# Patient Record
Sex: FEMALE | Race: WHITE | NOT HISPANIC OR LATINO | Employment: OTHER | URBAN - METROPOLITAN AREA
[De-identification: names, ages, dates, MRNs, and addresses within clinical notes are randomized per-mention and may not be internally consistent; named-entity substitution may affect disease eponyms.]

---

## 2018-06-04 ENCOUNTER — OFFICE VISIT CONVERTED (OUTPATIENT)
Dept: FAMILY MEDICINE CLINIC | Facility: CLINIC | Age: 63
End: 2018-06-04
Attending: NURSE PRACTITIONER

## 2018-06-18 ENCOUNTER — CONVERSION ENCOUNTER (OUTPATIENT)
Dept: FAMILY MEDICINE CLINIC | Facility: CLINIC | Age: 63
End: 2018-06-18

## 2018-06-18 ENCOUNTER — OFFICE VISIT CONVERTED (OUTPATIENT)
Dept: FAMILY MEDICINE CLINIC | Facility: CLINIC | Age: 63
End: 2018-06-18
Attending: NURSE PRACTITIONER

## 2018-07-02 ENCOUNTER — OFFICE VISIT CONVERTED (OUTPATIENT)
Dept: FAMILY MEDICINE CLINIC | Facility: CLINIC | Age: 63
End: 2018-07-02
Attending: NURSE PRACTITIONER

## 2018-07-02 ENCOUNTER — CONVERSION ENCOUNTER (OUTPATIENT)
Dept: FAMILY MEDICINE CLINIC | Facility: CLINIC | Age: 63
End: 2018-07-02

## 2018-07-16 ENCOUNTER — OFFICE VISIT CONVERTED (OUTPATIENT)
Dept: FAMILY MEDICINE CLINIC | Facility: CLINIC | Age: 63
End: 2018-07-16
Attending: NURSE PRACTITIONER

## 2018-07-30 ENCOUNTER — OFFICE VISIT CONVERTED (OUTPATIENT)
Dept: FAMILY MEDICINE CLINIC | Facility: CLINIC | Age: 63
End: 2018-07-30
Attending: NURSE PRACTITIONER

## 2018-07-30 ENCOUNTER — CONVERSION ENCOUNTER (OUTPATIENT)
Dept: FAMILY MEDICINE CLINIC | Facility: CLINIC | Age: 63
End: 2018-07-30

## 2018-08-13 ENCOUNTER — OFFICE VISIT CONVERTED (OUTPATIENT)
Dept: FAMILY MEDICINE CLINIC | Facility: CLINIC | Age: 63
End: 2018-08-13
Attending: NURSE PRACTITIONER

## 2018-08-27 ENCOUNTER — OFFICE VISIT CONVERTED (OUTPATIENT)
Dept: FAMILY MEDICINE CLINIC | Facility: CLINIC | Age: 63
End: 2018-08-27
Attending: NURSE PRACTITIONER

## 2018-09-24 ENCOUNTER — OFFICE VISIT CONVERTED (OUTPATIENT)
Dept: FAMILY MEDICINE CLINIC | Facility: CLINIC | Age: 63
End: 2018-09-24
Attending: NURSE PRACTITIONER

## 2018-10-23 ENCOUNTER — OFFICE VISIT CONVERTED (OUTPATIENT)
Dept: FAMILY MEDICINE CLINIC | Facility: CLINIC | Age: 63
End: 2018-10-23
Attending: NURSE PRACTITIONER

## 2018-11-08 ENCOUNTER — OFFICE VISIT CONVERTED (OUTPATIENT)
Dept: GASTROENTEROLOGY | Facility: CLINIC | Age: 63
End: 2018-11-08
Attending: PHYSICIAN ASSISTANT

## 2018-11-20 ENCOUNTER — OFFICE VISIT CONVERTED (OUTPATIENT)
Dept: FAMILY MEDICINE CLINIC | Facility: CLINIC | Age: 63
End: 2018-11-20
Attending: NURSE PRACTITIONER

## 2019-01-16 ENCOUNTER — HOSPITAL ENCOUNTER (OUTPATIENT)
Dept: FAMILY MEDICINE CLINIC | Facility: CLINIC | Age: 64
Discharge: HOME | End: 2019-01-16
Attending: NURSE PRACTITIONER

## 2019-01-16 ENCOUNTER — OFFICE VISIT CONVERTED (OUTPATIENT)
Dept: FAMILY MEDICINE CLINIC | Facility: CLINIC | Age: 64
End: 2019-01-16
Attending: NURSE PRACTITIONER

## 2019-01-16 LAB
FOLATE SERPL-MCNC: 15.7 NG/ML (ref 4.8–20)
VIT B12 SERPL-MCNC: 661 PG/ML (ref 211–911)

## 2019-02-14 ENCOUNTER — OFFICE VISIT CONVERTED (OUTPATIENT)
Dept: FAMILY MEDICINE CLINIC | Facility: CLINIC | Age: 64
End: 2019-02-14
Attending: NURSE PRACTITIONER

## 2019-03-12 ENCOUNTER — OFFICE VISIT CONVERTED (OUTPATIENT)
Dept: FAMILY MEDICINE CLINIC | Facility: CLINIC | Age: 64
End: 2019-03-12
Attending: NURSE PRACTITIONER

## 2019-03-12 ENCOUNTER — CONVERSION ENCOUNTER (OUTPATIENT)
Dept: FAMILY MEDICINE CLINIC | Facility: CLINIC | Age: 64
End: 2019-03-12

## 2019-07-08 ENCOUNTER — HOSPITAL ENCOUNTER (OUTPATIENT)
Dept: FAMILY MEDICINE CLINIC | Facility: CLINIC | Age: 64
Discharge: HOME | End: 2019-07-08
Attending: NURSE PRACTITIONER

## 2019-07-08 ENCOUNTER — OFFICE VISIT CONVERTED (OUTPATIENT)
Dept: FAMILY MEDICINE CLINIC | Facility: CLINIC | Age: 64
End: 2019-07-08
Attending: NURSE PRACTITIONER

## 2019-07-08 LAB
25(OH)D3 SERPL-MCNC: 41.5 NG/ML (ref 30–100)
ALBUMIN SERPL-MCNC: 4.3 G/DL (ref 3.5–5)
ALBUMIN/GLOB SERPL: 1.7 {RATIO} (ref 1.4–2.6)
ALP SERPL-CCNC: 104 U/L (ref 43–160)
ALT SERPL-CCNC: 11 U/L (ref 10–40)
ANION GAP SERPL CALC-SCNC: 21 MMOL/L (ref 8–19)
APPEARANCE UR: ABNORMAL
AST SERPL-CCNC: 17 U/L (ref 15–50)
BASOPHILS # BLD AUTO: 0.04 10*3/UL (ref 0–0.2)
BASOPHILS NFR BLD AUTO: 0.4 % (ref 0–3)
BILIRUB SERPL-MCNC: 0.36 MG/DL (ref 0.2–1.3)
BILIRUB UR QL: NEGATIVE
BUN SERPL-MCNC: 10 MG/DL (ref 5–25)
BUN/CREAT SERPL: 12 {RATIO} (ref 6–20)
CALCIUM SERPL-MCNC: 9.8 MG/DL (ref 8.7–10.4)
CHLORIDE SERPL-SCNC: 101 MMOL/L (ref 99–111)
CHOLEST SERPL-MCNC: 236 MG/DL (ref 107–200)
CHOLEST/HDLC SERPL: 4.3 {RATIO} (ref 3–6)
COLOR UR: YELLOW
CONV ABS IMM GRAN: 0.05 10*3/UL (ref 0–0.2)
CONV CO2: 26 MMOL/L (ref 22–32)
CONV COLLECTION SOURCE (UA): ABNORMAL
CONV CRYSTALS: ABNORMAL /[HPF]
CONV IMMATURE GRAN: 0.5 % (ref 0–1.8)
CONV TOTAL PROTEIN: 6.9 G/DL (ref 6.3–8.2)
CONV UROBILINOGEN IN URINE BY AUTOMATED TEST STRIP: 0.2 {EHRLICHU}/DL (ref 0.1–1)
CREAT UR-MCNC: 0.81 MG/DL (ref 0.5–0.9)
DEPRECATED RDW RBC AUTO: 51.3 FL (ref 36.4–46.3)
EOSINOPHIL # BLD AUTO: 0.11 10*3/UL (ref 0–0.7)
EOSINOPHIL # BLD AUTO: 1.2 % (ref 0–7)
ERYTHROCYTE [DISTWIDTH] IN BLOOD BY AUTOMATED COUNT: 13.5 % (ref 11.7–14.4)
FERRITIN SERPL-MCNC: 62 NG/ML (ref 10–200)
FOLATE SERPL-MCNC: 19.6 NG/ML (ref 4.8–20)
GFR SERPLBLD BASED ON 1.73 SQ M-ARVRAT: >60 ML/MIN/{1.73_M2}
GLOBULIN UR ELPH-MCNC: 2.6 G/DL (ref 2–3.5)
GLUCOSE SERPL-MCNC: 52 MG/DL (ref 65–99)
GLUCOSE UR QL: NEGATIVE MG/DL
HBA1C MFR BLD: 14.3 G/DL (ref 12–16)
HCT VFR BLD AUTO: 46.9 % (ref 37–47)
HDLC SERPL-MCNC: 55 MG/DL (ref 40–60)
HGB UR QL STRIP: ABNORMAL
IRON SATN MFR SERPL: 49 % (ref 20–55)
IRON SERPL-MCNC: 155 UG/DL (ref 60–170)
KETONES UR QL STRIP: NEGATIVE MG/DL
LDLC SERPL CALC-MCNC: 152 MG/DL (ref 70–100)
LEUKOCYTE ESTERASE UR QL STRIP: NEGATIVE
LYMPHOCYTES # BLD AUTO: 2.29 10*3/UL (ref 1–5)
MCH RBC QN AUTO: 31.2 PG (ref 27–31)
MCHC RBC AUTO-ENTMCNC: 30.5 G/DL (ref 33–37)
MCV RBC AUTO: 102.2 FL (ref 81–99)
MONOCYTES # BLD AUTO: 0.6 10*3/UL (ref 0.2–1.2)
MONOCYTES NFR BLD AUTO: 6.3 % (ref 3–10)
NEUTROPHILS # BLD AUTO: 6.38 10*3/UL (ref 2–8)
NEUTROPHILS NFR BLD AUTO: 67.4 % (ref 30–85)
NITRITE UR QL STRIP: NEGATIVE
NRBC CBCN: 0 % (ref 0–0.7)
OSMOLALITY SERPL CALC.SUM OF ELEC: 294 MOSM/KG (ref 273–304)
PH UR STRIP.AUTO: 5.5 [PH] (ref 5–8)
PLATELET # BLD AUTO: 270 10*3/UL (ref 130–400)
PMV BLD AUTO: 10.5 FL (ref 9.4–12.3)
POTASSIUM SERPL-SCNC: 3.6 MMOL/L (ref 3.5–5.3)
PROT UR QL: NEGATIVE MG/DL
RBC # BLD AUTO: 4.59 10*6/UL (ref 4.2–5.4)
RBC #/AREA URNS HPF: ABNORMAL /[HPF]
SODIUM SERPL-SCNC: 144 MMOL/L (ref 135–147)
SP GR UR: >=1.03 (ref 1–1.03)
T4 FREE SERPL-MCNC: 1.1 NG/DL (ref 0.9–1.8)
TIBC SERPL-MCNC: 317 UG/DL (ref 245–450)
TRANSFERRIN SERPL-MCNC: 222 MG/DL (ref 250–380)
TRIGL SERPL-MCNC: 147 MG/DL (ref 40–150)
TSH SERPL-ACNC: 2.05 M[IU]/L (ref 0.27–4.2)
VARIANT LYMPHS NFR BLD MANUAL: 24.2 % (ref 20–45)
VIT B12 SERPL-MCNC: 885 PG/ML (ref 211–911)
VLDLC SERPL-MCNC: 29 MG/DL (ref 5–37)
WBC # BLD AUTO: 9.47 10*3/UL (ref 4.8–10.8)
WBC #/AREA URNS HPF: ABNORMAL /[HPF]

## 2019-07-09 LAB — HCV AB SER DONR QL: <0.1 S/CO RATIO (ref 0–0.9)

## 2019-07-16 ENCOUNTER — HOSPITAL ENCOUNTER (OUTPATIENT)
Dept: OTHER | Facility: HOSPITAL | Age: 64
Discharge: HOME | End: 2019-07-16
Attending: NURSE PRACTITIONER

## 2019-07-23 ENCOUNTER — OFFICE VISIT CONVERTED (OUTPATIENT)
Dept: FAMILY MEDICINE CLINIC | Facility: CLINIC | Age: 64
End: 2019-07-23
Attending: NURSE PRACTITIONER

## 2019-11-11 ENCOUNTER — OFFICE VISIT CONVERTED (OUTPATIENT)
Dept: GASTROENTEROLOGY | Facility: CLINIC | Age: 64
End: 2019-11-11
Attending: PHYSICIAN ASSISTANT

## 2020-03-02 ENCOUNTER — HOSPITAL ENCOUNTER (OUTPATIENT)
Dept: GASTROENTEROLOGY | Facility: HOSPITAL | Age: 65
Setting detail: HOSPITAL OUTPATIENT SURGERY
Discharge: HOME | End: 2020-03-02
Attending: INTERNAL MEDICINE

## 2020-08-27 ENCOUNTER — OFFICE VISIT CONVERTED (OUTPATIENT)
Dept: FAMILY MEDICINE CLINIC | Facility: CLINIC | Age: 65
End: 2020-08-27
Attending: NURSE PRACTITIONER

## 2020-10-15 ENCOUNTER — HOSPITAL ENCOUNTER (OUTPATIENT)
Dept: FAMILY MEDICINE CLINIC | Facility: CLINIC | Age: 65
Discharge: HOME | End: 2020-10-15
Attending: NURSE PRACTITIONER

## 2020-10-15 LAB
ALBUMIN SERPL-MCNC: 4.3 G/DL (ref 3.5–5)
ALBUMIN/GLOB SERPL: 2 {RATIO} (ref 1.4–2.6)
ALP SERPL-CCNC: 106 U/L (ref 43–160)
ALT SERPL-CCNC: 10 U/L (ref 10–40)
ANION GAP SERPL CALC-SCNC: 15 MMOL/L (ref 8–19)
AST SERPL-CCNC: 19 U/L (ref 15–50)
BASOPHILS # BLD AUTO: 0.04 10*3/UL (ref 0–0.2)
BASOPHILS NFR BLD AUTO: 0.5 % (ref 0–3)
BILIRUB SERPL-MCNC: 0.27 MG/DL (ref 0.2–1.3)
BUN SERPL-MCNC: 9 MG/DL (ref 5–25)
BUN/CREAT SERPL: 12 {RATIO} (ref 6–20)
CALCIUM SERPL-MCNC: 9.7 MG/DL (ref 8.7–10.4)
CHLORIDE SERPL-SCNC: 107 MMOL/L (ref 99–111)
CHOLEST SERPL-MCNC: 216 MG/DL (ref 107–200)
CHOLEST/HDLC SERPL: 4.2 {RATIO} (ref 3–6)
CONV ABS IMM GRAN: 0.03 10*3/UL (ref 0–0.2)
CONV CO2: 24 MMOL/L (ref 22–32)
CONV IMMATURE GRAN: 0.4 % (ref 0–1.8)
CONV TOTAL PROTEIN: 6.5 G/DL (ref 6.3–8.2)
CREAT UR-MCNC: 0.77 MG/DL (ref 0.5–0.9)
DEPRECATED RDW RBC AUTO: 46.1 FL (ref 36.4–46.3)
EOSINOPHIL # BLD AUTO: 0.12 10*3/UL (ref 0–0.7)
EOSINOPHIL # BLD AUTO: 1.4 % (ref 0–7)
ERYTHROCYTE [DISTWIDTH] IN BLOOD BY AUTOMATED COUNT: 13 % (ref 11.7–14.4)
FOLATE SERPL-MCNC: 17.2 NG/ML (ref 4.8–20)
GFR SERPLBLD BASED ON 1.73 SQ M-ARVRAT: >60 ML/MIN/{1.73_M2}
GLOBULIN UR ELPH-MCNC: 2.2 G/DL (ref 2–3.5)
GLUCOSE SERPL-MCNC: 111 MG/DL (ref 65–99)
HCT VFR BLD AUTO: 45 % (ref 37–47)
HDLC SERPL-MCNC: 51 MG/DL (ref 40–60)
HGB BLD-MCNC: 14.7 G/DL (ref 12–16)
LDLC SERPL CALC-MCNC: 131 MG/DL (ref 70–100)
LYMPHOCYTES # BLD AUTO: 2.28 10*3/UL (ref 1–5)
LYMPHOCYTES NFR BLD AUTO: 26.8 % (ref 20–45)
MCH RBC QN AUTO: 31.8 PG (ref 27–31)
MCHC RBC AUTO-ENTMCNC: 32.7 G/DL (ref 33–37)
MCV RBC AUTO: 97.4 FL (ref 81–99)
MONOCYTES # BLD AUTO: 0.65 10*3/UL (ref 0.2–1.2)
MONOCYTES NFR BLD AUTO: 7.6 % (ref 3–10)
NEUTROPHILS # BLD AUTO: 5.4 10*3/UL (ref 2–8)
NEUTROPHILS NFR BLD AUTO: 63.3 % (ref 30–85)
NRBC CBCN: 0 % (ref 0–0.7)
OSMOLALITY SERPL CALC.SUM OF ELEC: 293 MOSM/KG (ref 273–304)
PLATELET # BLD AUTO: 285 10*3/UL (ref 130–400)
PMV BLD AUTO: 10 FL (ref 9.4–12.3)
POTASSIUM SERPL-SCNC: 4.2 MMOL/L (ref 3.5–5.3)
RBC # BLD AUTO: 4.62 10*6/UL (ref 4.2–5.4)
SODIUM SERPL-SCNC: 142 MMOL/L (ref 135–147)
TRIGL SERPL-MCNC: 171 MG/DL (ref 40–150)
VIT B12 SERPL-MCNC: 814 PG/ML (ref 211–911)
VLDLC SERPL-MCNC: 34 MG/DL (ref 5–37)
WBC # BLD AUTO: 8.52 10*3/UL (ref 4.8–10.8)

## 2021-05-07 NOTE — PROGRESS NOTES
Progress Note      Patient Name: Faith Alegria   Patient ID: [id number]   Sex: Female   YOB: 1955        Visit Date: June 18, 2018    Provider: LIVE Lazaro   Location: [facility]   Location Address: [address] [place] [address]  [id number]   Location Phone: [phone]          Chief Complaint     follow up on plantar wart       History Of Present Illness  Faith Alegria is a 62 year old /White female who presents for evaluation and treatment of:      right plantar wart F/U treatment--    daughter also noticed while grandson was in the hospital --that her right index finger would shake and then her head very finely as well--they are concerned with Parkinson's--    and left eye also got a bleed--was sent from eye MD to ophthalmologist--for injections to the left eye and then vision has improved a lot--and then pt just didn't F/U with them due to the grandsons death     but admits that had to even get motivated     then takes advil at times for back at PM and Benadryl at times--and no trouble with stomach--    and admits to also a balance issue at times--then also like a mild pressure to the right sheng of the posterior aspect of her head            Past Medical History  Disease Name Date Onset Notes   Arthritis --  --    Cataract --  --    GERD (gastroesophageal reflux disease) --  --    Hyperlipidemia --  --          Past Surgical History  Procedure Name Date Notes   Cataract surgery --  --    Foot surgery --  --    Hysterectomy --  --    Hysterectomy (due to cancer) --  --    Low Back Disc --  --    Shoulder surgery --  --          Allergy List  Allergen Name Date Reaction Notes   NO KNOWN DRUG ALLERGIES --  --  --          Family Medical History  Disease Name Relative/Age Notes   Alcohol Abuse / Father; Grandfather (maternal); Grandfather (paternal); Brother    Brother/     Father/     Grandfather (maternal)/     Grandfather  (paternal)/    Depression / Mother; Brother; Sister    Brother/     Mother/     Sister/    Hyperlipidemia / Mother; Sister    Mother/     Sister/    Hypertension / Mother; Brother; Sister    Brother/     Mother/     Sister/    Osteoporosis / Sister    Sister/          Social History  Finding Status Start/Stop Quantity Notes   Alcohol Never --/-- --  never drinks alcohol   Exercises regularly --  --/-- --  occasionally   Recreational Drug Use Never --/-- --  never used   Second hand smoke exposure Unknown --/-- --  yes   Tobacco Current - status unknown --/-- 0.5 PPD smokes less than 1 pack per day, for 20 years, never uses other tobacco products  SMOKER. ABOUT PACK A DAY.   Uses seatbelts --  --/-- --  yes         Review of Systems  · Constitutional  o Denies  o : fever  · Eyes  o Admits  o : impaired vision, additional ocular symptoms except as noted in the HPI  o Denies  o : discharge from eye, eye discomfort, eye pain  · HENT  o Denies  o : hearing loss or ringing, chronic sinus problem, swollen glands in neck  · Cardiovascular  o Denies  o : chest pain, palpitations (fast, fluttering, or skipping beats), swelling (feet, ankles, hands), shortness of breath while walking or lying flat  · Respiratory  o Denies  o : shortness of breath, asthma or wheezing, COPD  · Gastrointestinal  o Denies  o : ulcers, nausea or vomiting  · Genitourinary  o Denies  o : dysuria  · Integument  o Admits  o : additional integumentary symptoms except as noted in the HPI  · Neurologic  o Admits  o : tremors, loss of balance  · Musculoskeletal  o Admits  o : joint pain, back pain  · Endocrine  o Denies  o : thyroid disease, diabetes, heat or cold intolerance, excessive thirst or urination  · Psychiatric  o Admits  o : additional psychiatric symptoms except as noted in the HPI  o Denies  o : anxiety, depression, suicidal ideation, homicidal ideation  · Heme-Lymph  o Denies  o : bleeding or bruising tendency, anemia      Vitals  Date Time  BP Position Site L\R Cuff Size HR RR TEMP(F) WT  HT  BMI kg/m2 BSA m2 O2 Sat        06/18/2018 10:07 /68 Sitting    103 - R  97 136lbs 8oz    97 %           Physical Examination  · Constitutional  o Appearance  o : well developed, well-nourished, in no acute distress  · Eyes  o Conjunctivae  o : conjunctivae normal no drainage  o Pupils and Irises  o : pupils equal, round, and reactive to light bilaterally  · Neck  o Inspection/Palpation  o : supple  o Thyroid  o : no thyromegaly  · Respiratory  o Respiratory Effort  o : breathing unlabored  o Auscultation of Lungs  o : clear to ascultation  · Cardiovascular  o Heart  o :   § Auscultation of Heart  § : regular rate and rhythm  · Musculoskeletal  o General  o :   § General Musculoskeletal  § : No joint swelling or deformity., Muscle tone, strength, and development grossly normal.  · Skin and Subcutaneous Tissue  o General Inspection  o : no lesions present, no areas of discoloration, skin turgor normal, texture normal--except the small plantar wart to the upper outer aspect of the right bottom of the foot--and applied liquid nitrogen to the bottom of the foot approx. 30 seconds and then tolerated well   · Neurologic  o Mental Status Examination  o :   § Orientation  § : grossly oriented to person, place and time (+) fine hand tremors resting --a little tearful today mentioning her grandson   o Gait and Station  o :   § Gait Screening  § : normal gait              Assessment  · Balance problem     781.99/R26.89  · Tremor     781.0/R25.1  · Screening, lipid     V77.91/Z13.220  · Plantar wart of right foot     078.12/B07.0      Plan  · Orders  o CBC with Auto Diff [facility] ([id number]) - 781.99/R26.89, 781.0/R25.1 - 06/18/2018  o CMP [facility] ([id number]) - 781.99/R26.89, 781.0/R25.1 - 06/18/2018  o Lipid Panel [facility] (47514) - V77.91/Z13.220 - 06/18/2018  o Thyroid Profile (THYII) - 781.99/R26.89, 781.0/R25.1 - 06/18/2018  o ACO-39: Current medications updated and reviewed () -  - 06/18/2018  o CT Head/Brain without IV Contrast [facility] (26361) - 781.99/R26.89, 781.0/R25.1 - 06/18/2018  o NEUROLOGY CONSULTATION. (NEURO) - 781.99/R26.89, 781.0/R25.1 - 06/18/2018  o Vitamin B12 and folate measurement (48023, 17071) - 781.99/R26.89, 781.0/R25.1 - 06/18/2018  · Instructions  o Patient was educated/instructed on their diagnosis, treatment and medications prior to discharge from the clinic today.  o Patient instructed to seek medical attention urgently for new or worsening symptoms.  o Call the office with any concerns or questions.  · Disposition  o Call or Return if symptoms worsen or persist.     pt can F/U in 2 weeks should the plantar wart still need Tx             Electronically Signed by: LIVE Lazaro -Author on June 18, 2018 10:40:01 AM

## 2021-05-07 NOTE — PROGRESS NOTES
Progress Note      Patient Name: Faith Alegria   Patient ID: [id number]   Sex: Female   YOB: 1955    Primary Care Provider: Rosalba CROWDER   Referring Provider: Rosalba CROWDER    Visit Date: August 27, 2020    Provider: VEL Lazaro   Location: [facility]   Location Address: [address] [place] [address] [place]  [id number]   Location Phone: [phone]          Chief Complaint  · Annual Wellness Exam      History Of Present Illness  The patient is a 65 year old /White female who has come to this office for her Annual Wellness Visit.   Her Primary Care Provider is Rosalba CROWDER. Her comprehensive Care Team list, including suppliers, has been updated on the Facesheet. Her medical/family history, height, weight, BMI, and blood pressure have been reviewed and are in the chart. The Health Risk Assessment has been completed and scanned in the chart.   Medications are listed in the medication list.   The active problem list includes: Arthritis, Cataract, GERD (gastroesophageal reflux disease), and Hyperlipidemia   The patient does have a history of substance use. This includes tobacco use.   Patient reports her diet is adequate.   The Mini-Cog has been administered and is scanned in chart. The results are negative. Her cognitive function is without limitation.   A hearing loss screen was completed today and the result is negative.   Patient does not have any risk factors for depression. Patient completed the PHQ-9 today and it has been scanned in the chart. The total score is 1-4.   The Timed Up and Go screen was administered today and the result is negative.   The Mullen Index of [place] in ADLs indicated full function (score of 6).   A Falls Risk Assessment has been completed, including a review of home fall hazards and medication review.   Overall, the patient's functional ability is noted by this provider to be within normal  limits. Her level of safety is noted to be within normal limits. Her balance/gait is within normal limits. There have been no falls in the past year. Patient-specific home safety recommendations have been reviewed and a copy has been given to patient.   She denies issues with leaking urine.   There are no additional risk factors identified.   Living Will/Advanced Directive no-but information given today.   Personalized health advice was given to the patient and a written health screening schedule was established; see Plan for details.   Faith Alegria is a 65 year old /White female who presents for evaluation and treatment of:      pt with OA and GERD and dyslipidemia and cataracts--Hx    pt was dog bite approx. 9-10 DAYS AGO AND TREATED AT [facility] AND SUTURES DUE FOR REMOVAL AND RECEIEVD TDAP THERE       Past Medical History  Disease Name Date Onset Notes   Arthritis --  --    Bone Density Screening 07/16/2019 --    Cataract --  --    GERD (gastroesophageal reflux disease) --  --    Hyperlipidemia --  --    Screening Mammogram 07/16/2019 --          Past Surgical History  Procedure Name Date Notes   Cataract surgery --  --    Foot surgery --  --    Hysterectomy --  --    Hysterectomy (due to cancer) --  --    Low Back Disc --  --    Shoulder surgery --  --          Medication List  Name Date Started Instructions   cyanocobalamin (vitamin B-12) 1,000 mcg/mL injection solution 07/08/2019 inject 1 milliliter (1,000 mcg) by intramuscular route once a month for 30 days         Allergy List  Allergen Name Date Reaction Notes   NO KNOWN DRUG ALLERGIES --  --  --          Family Medical History  Disease Name Relative/Age Notes   Depression Brother/  Mother/  Sister/   Mother; Brother; Sister   Hyperlipidemia Mother/  Sister/   Mother; Sister   Hypertension Brother/  Mother/  Sister/   Mother; Brother; Sister   Osteoporosis Sister/   Sister   Alcohol abuse Brother/  Father/  Grandfather  (maternal)/  Grandfather (paternal)/   Father; Grandfather (maternal); Grandfather (paternal); Brother         Social History  Finding Status Start/Stop Quantity Notes   Alcohol Never --/-- --  never drinks alcohol   Exercises regularly --  --/-- --  occasionally   Recreational Drug Use Never --/-- --  never used   Second hand smoke exposure Unknown --/-- --  yes   Tobacco Current every day 18/-- 0.5 PPD smokes less than 1 pack per day, for 20 years, never uses other tobacco products  SMOKER. ABOUT PACK A DAY.   Uses seatbelts --  --/-- --  yes         Immunizations  NameDate Admin Mfg Trade Name Lot Number Route Inj VIS Given VIS Publication   Egoewiyud08/23/2018 [facility] FLUARIX [id number] IM  10/23/2018 08/07/2015   Comments: NDC [id number]   Vlhielozp8650/23/2019 [employer] PNEUMOVAX 23 [id number] IM  07/23/2019    Comments: NDC [id number]         Review of Systems  · Constitutional  o Denies  o : fatigue, fever, recent weight changes  · Eyes  o Denies  o : double vision, blurred vision  · HENT  o Denies  o : ear pain, sore throat, hoarseness, dysphagia, enlarged lymph nodes  · Cardiovascular  o Denies  o : chest discomfort, palpitations  · Respiratory  o Denies  o : cough, wheezing, shortness of breath  · Gastrointestinal  o Denies  o : abdominal pain, melena, nausea, vomiting  · Genitourinary  o Denies  o : dysuria  · Integument  o Denies  o : rash  · Neurologic  o Denies  o : paralysis, tingling or numbness, incoordination, memory difficulties, tremors, altered mental status, difficulty concentrating, seizures, loss of balance  · Musculoskeletal  o Admits  o : joint pain  o Denies  o : joint swelling  · Endocrine  o Denies  o : polyuria, polydipsia, cold intolerance, heat intolerance, hot flashes, hirsutism, loss of hair  · Psychiatric  o Denies  o : depression, suicidal ideation  · Heme-Lymph  o Denies  o : lightheadedness, easy bleeding, easy bruising, petechiae, lymph node enlargement or  "tenderness  · Allergic-Immunologic  o Denies  o : sinus allergy symptoms, frequent illnesses      Vitals  Date Time BP Position Site L\R Cuff Size HR RR TEMP (F) WT  HT  BMI kg/m2 BSA m2 O2 Sat        08/27/2020 01:10 /68 Sitting    103 - R  98 131lbs 9oz 4'  9.5\" 27.98 1.56 97 %          Physical Examination  · Constitutional  o Appearance  o : well-nourished, well developed, no obvious deformities present  · Eyes  o Conjunctivae  o : conjunctivae normal no drainage  o Pupils and Irises  o : pupils equal, round, and reactive to light bilaterally  · Neck  o Inspection/Palpation  o : supple  o Thyroid  o : no thyromegaly  · Respiratory  o Respiratory Effort  o : breathing unlabored  o Auscultation of Lungs  o : clear to ascultation  · Cardiovascular  o Heart  o :   § Auscultation of Heart  § : regular rate and rhythm  · Musculoskeletal  o General  o :   § General Musculoskeletal  § : No joint swelling or deformity., Muscle tone, strength, and development grossly normal. some OA and joint swelling of the great toe joints and the hands bilat   · Skin and Subcutaneous Tissue  o General Inspection  o : skin turgor normal, texture normal  · Neurologic  o Mental Status Examination  o :   § Orientation  § : grossly oriented to person, place and time  o Gait and Station  o :   § Gait Screening  § : normal gait  · In Office Procedures  o In Office Procedure  o : left arm--at the wrist area (inner aspect) intact sutures from the prior dog bit and due for sutures removed-no s/s of infections--removed 4 placed of the sutures total of 8 sutures removed and pt tolerated well               Assessment  · Screening for alcoholism     V79.1/Z13.89  · Screening for depression     V79.0/Z13.89  · Encounter for Medicare annual wellness exam     V70.0/Z00.00  · Encounter for removal of sutures     V58.32/Z48.02  · Osteoarthritis     715.90/M19.90  · Dyslipidemia     272.4/E78.5  · GERD (gastroesophageal reflux " disease)     530.81/K21.9  · Vitamin B12 deficiency     266.2/E53.8  · Hyperlipidemia     272.4/E78.5  · Tobacco abuse counseling       Tobacco abuse counseling     V65.42/Z71.6  · Osteopenia     733.90/M85.80  last BD 2019      Plan  · Orders  o Negative alcohol screening () - V79.1/Z13.89 - 08/27/2020  o Negative screen for clinical depression using a standardized tool, patient not eligible/appropriate for follow-up plan documented () - V79.0/Z13.89 - 08/27/2020  o Falls Risk Assessment Completed (3288F) - V70.0/Z00.00 - 08/27/2020  o Brief hearing screening (written) [facility] () - V70.0/Z00.00 - 08/27/2020  o Annual wellness visit, includes a personalized prevention plan of service (pps), subsequent visit () - V70.0/Z00.00 - 08/27/2020  o ACO-13: Fall Risk Screening with no falls in past year or only one fall without injury in the past year (1101F) - V70.0/Z00.00 - 08/27/2020  o Presence or absence of urinary incontinence assessed (PIA) (1090F) - V70.0/Z00.00 - 08/27/2020  o B12 Folate levels (B12FO) - 266.2/E53.8 - 08/27/2020  o HTN/Lipid Panel (CMP, Lipid) [facility] (60028, 27333) - 272.4/E78.5 - 08/27/2020  o Smoking cessation counseling, 3-10 minutes [facility] (96051) - V65.42/Z71.6 - 08/27/2020  o ACO-17: Screened for tobacco use AND received tobacco cessation intervention (4004F) - V65.42/Z71.6 - 08/27/2020  o ACO-39: Current medications updated and reviewed () - - 08/27/2020  o ACO-19: Colorectal cancer screening results documented and reviewed (3017F) - - 08/27/2020  o ACO-20: Screening Mammography documented and reviewed () - - 08/27/2020 7/2019  o CBC with Auto Diff [facility] (01882) - 715.90/M19.90, 272.4/E78.5, 530.81/K21.9 - 08/27/2020  o ACO-13: Fall Risk Screening with no falls in past year or only one fall without injury in the past year (1101F) - - 08/27/2020  o ACO-18: Negative screen for clinical depression using a standardized tool () - - 08/27/2020    3  · Medications  o cyanocobalamin (vitamin B-12) 1,000 mcg/mL injection solution   SIG: inject 1 milliliter (1,000 mcg) by intramuscular route once a month for 30 days   DISP: (1) 1 ml vial with 5 refills  Discontinued on 08/27/2020     o Medications have been Reconciled  o Transition of Care or Provider Policy  · Instructions  o Audit-C Questionnaire completed and scanned into the EMR under the designated folder within the patient's documents.  o Audit-C score of 0-4 - Negative Screen - Brief Discussion  o Depression Screen completed and scanned into the EMR under the designated folder within the patient's documents.  o PHQ-9 results between 1-4 indicate low-risk for Depression  o Today's PHQ-9 score is: _3__  o Health Risk Assessment has been reviewed with the patient.  o Written health screening schedule for next 5-10 years was established with patient; information scanned in chart and given/mailed to patient.  o Fall prevention methods discussed and a copy of recommendations given/mailed to patient.  o Counseled on the need for diet changes (low carbohydrate; avoid sweets) and exercise.   o Tylenol may be used as needed every 4-6 hours for pain.  o Maintain a healthy weight. Avoid tight fitting clothes. Avoid fried, fatty foods, tomato sauce, chocolate, mint, garlic, onion, alcohol. caffeine. Eat smaller meals, dont lie down after a meal, dont smoke. Elevate the head of your bed 6-9 inches.  o Advised that cheeses and other sources of dairy fats, animal fats, fast food, and the extras (candy, pasteries, pies, doughnuts and cookies) all contain LDL raising nutrients. Advised to increase fruits, vegetables, whole grains, and to monitor portion sizes.   o Tobacco and smoking cessation counseling for more than 3 minutes was completed.  o Handouts were given to patient: for the medicare AR and safety paperwork   o Patient was educated/instructed on their diagnosis, treatment and medications prior to discharge from  the clinic today.  o Call the office with any concerns or questions.  o Chronic conditions reviewed and taken into consideration for today's treatment plan.  o Electronically Identified Patient Education Materials Provided Electronically  · Disposition  o Call or Return if symptoms worsen or persist.  o Return Visit Request in/on 1 year +/- 2 days (7622).            Electronically Signed by: VEL Lazaro -Author on August 27, 2020 02:27:14 PM

## 2021-05-07 NOTE — PROGRESS NOTES
Progress Note      Patient Name: Faith Alegria   Patient ID: [id number]   Sex: Female   YOB: 1955        Visit Date: July 2, 2018    Provider: LIVE Lazaro   Location: [facility]   Location Address: [address] [place] [address]  [id number]   Location Phone: [phone]          Chief Complaint     PATIENT IS HERE TO F/U ON LAB WORK, START B12 SHOTS AND TO LOOK AT THE PLANTUAR WART ON THE BOTTOM OF HER RIGHT FOOT.       History Of Present Illness  Faith Alegria is a 62 year old /White female who presents for evaluation and treatment of:      pt is to start vit B12 shots monthly--    then also needs treatment on the plantar wart again--       Past Medical History  Disease Name Date Onset Notes   Arthritis --  --    Cataract --  --    GERD (gastroesophageal reflux disease) --  --    Hyperlipidemia --  --          Past Surgical History  Procedure Name Date Notes   Cataract surgery --  --    Foot surgery --  --    Hysterectomy --  --    Hysterectomy (due to cancer) --  --    Low Back Disc --  --    Shoulder surgery --  --          Allergy List  Allergen Name Date Reaction Notes   NO KNOWN DRUG ALLERGIES --  --  --          Family Medical History  Disease Name Relative/Age Notes   Alcohol Abuse / Father; Grandfather (maternal); Grandfather (paternal); Brother    Brother/     Father/     Grandfather (maternal)/     Grandfather (paternal)/    Depression / Mother; Brother; Sister    Brother/     Mother/     Sister/    Hyperlipidemia / Mother; Sister    Mother/     Sister/    Hypertension / Mother; Brother; Sister    Brother/     Mother/     Sister/    Osteoporosis / Sister    Sister/          Social History  Finding Status Start/Stop Quantity Notes   Alcohol Never --/-- --  never drinks alcohol   Exercises regularly --  --/-- --  occasionally   Recreational Drug Use Never --/-- --  never used   Second hand smoke exposure Unknown --/-- --  yes  "  Tobacco Current - status unknown --/-- 0.5 PPD smokes less than 1 pack per day, for 20 years, never uses other tobacco products  SMOKER. ABOUT PACK A DAY.   Uses seatbelts --  --/-- --  yes         Review of Systems  · Constitutional  o Admits  o : fatigue  o Denies  o : fever  · HENT  o Denies  o : vertigo, recent head injury  · Cardiovascular  o Denies  o : chest pain, irregular heart beats  · Respiratory  o Denies  o : shortness of breath, productive cough  · Gastrointestinal  o Denies  o : nausea, vomiting  · Genitourinary  o Denies  o : dysuria, urinary retention  · Integument  o Admits  o : additional integumentary symptoms except as noted in the HPI  o Denies  o : hair growth change, new skin lesions  · Neurologic  o Denies  o : altered mental status, seizures  · Musculoskeletal  o Denies  o : joint swelling, limitation of motion  · Endocrine  o Denies  o : cold intolerance, heat intolerance  · Heme-Lymph  o Denies  o : petechiae, lymph node enlargement or tenderness  · Allergic-Immunologic  o Denies  o : frequent illnesses      Vitals  Date Time BP Position Site L\R Cuff Size HR RR TEMP(F) WT  HT  BMI kg/m2 BSA m2 O2 Sat HC       07/02/2018 10:07 /58 Sitting    100 - R  97 136lbs 0oz 4'  8\" 30.49 1.56 97 %           Physical Examination  · Constitutional  o Appearance  o : well developed, well-nourished, in no acute distress  · Head and Face  o HEENT  o : Unremarkable  · Eyes  o Conjunctivae  o : conjunctivae normal  · Neck  o Inspection/Palpation  o : supple  o Thyroid  o : no thyromegaly  · Respiratory  o Respiratory Effort  o : breathing unlabored  o Auscultation of Lungs  o : clear to ascultation  · Cardiovascular  o Heart  o :   § Auscultation of Heart  § : regular rate and rhythm  o Peripheral Vascular System  o :   § Extremities  § : no edema  · Lymphatic  o Neck  o : no lymphadenopathy present  · Musculoskeletal  o General  o :   § General Musculoskeletal  § : No joint swelling or " deformity., Muscle tone, strength, and development grossly normal.  · Skin and Subcutaneous Tissue  o General Inspection  o : right foot with (+) plantar wart--and here for Tx and used/applied liquid nitrogen approx. total 30 seconds and tolerated well--and skin turgor normal, texture normal  · Neurologic  o Gait and Station  o :   § Gait Screening  § : normal gait  · Psychiatric  o Mood and Affect  o : mood normal, affect appropriate              Assessment  · Fatigue     780.79/R53.83  · Plantar wart of right foot     078.12/B07.0  · Low vitamin B12 level     266.2/E53.8      Plan  · Orders  o ACO-39: Current medications updated and reviewed () - - 07/02/2018  o Vitamin B12 Injection () - 266.2/E53.8, 780.79/R53.83 - 07/02/2018   Injection - Vitamin B12; Dose: 1 mL; Site: Left Upper Arm; Route: intramuscular; Date: 07/02/2018 10:42:06; Exp: 04/01/2019; Lot: [id number]; Mfg: [employer]; TradeName: cyanocobalamin (vitamin B-12); Location: [facility]; Administered By: Anna Harmon MA; Comment: NDC [id number]  o IM - Injection Fee (29973) - 266.2/E53.8, 780.79/R53.83 - 07/02/2018  · Medications  o cyanocobalamin (vitamin B-12) 1,000 mcg/mL injection solution   SIG: inject 1 milliliter (1,000 mcg) by intramuscular route once a month for 30 days   DISP: (1) 1 ml vial with 5 refills  Prescribed on 07/02/2018     · Instructions  o Patient was educated/instructed on their diagnosis, treatment and medications prior to discharge from the clinic today.  o Patient instructed to seek medical attention urgently for new or worsening symptoms.  o Call the office with any concerns or questions.  o Chronic conditions reviewed and taken into consideration for today's treatment plan.  · Disposition  o Call or Return if symptoms worsen or persist.  o Return Visit Request in/on 2 weeks +/- 2 days ([id number]).            Electronically Signed by: LIVE Lazaro -Author on July 2, 2018 11:42:32 AM

## 2021-05-07 NOTE — PROGRESS NOTES
Progress Note      Patient Name: Faith Alegria   Patient ID: [id number]   Sex: Female   YOB: 1955    Primary Care Provider: Rosalba MANCINI   Referring Provider: Rosalba MANCINI    Visit Date: November 20, 2018    Provider: LIVE Lazaro   Location: [facility]   Location Address: [address] [place] [address]  [id number]   Location Phone: [phone]          Chief Complaint     Here for B12 shot only.       History Of Present Illness  Faith Alegria is a 63 year old /White female who presents for evaluation and treatment of:      pt here for her B12 shot--       Past Medical History  Disease Name Date Onset Notes   Arthritis --  --    Cataract --  --    GERD (gastroesophageal reflux disease) --  --    Hyperlipidemia --  --          Past Surgical History  Procedure Name Date Notes   Cataract surgery --  --    Foot surgery --  --    Hysterectomy --  --    Hysterectomy (due to cancer) --  --    Low Back Disc --  --    Shoulder surgery --  --          Medication List  Name Date Started Instructions   cyanocobalamin (vitamin B-12) 1,000 mcg/mL injection solution 07/02/2018 inject 1 milliliter (1,000 mcg) by intramuscular route once a month for 30 days         Allergy List  Allergen Name Date Reaction Notes   NO KNOWN DRUG ALLERGIES --  --  --          Family Medical History  Disease Name Relative/Age Notes   Alcohol Abuse / Father; Grandfather (maternal); Grandfather (paternal); Brother    Brother/     Father/     Grandfather (maternal)/     Grandfather (paternal)/    Depression / Mother; Brother; Sister    Brother/     Mother/     Sister/    Hyperlipidemia / Mother; Sister    Mother/     Sister/    Hypertension / Mother; Brother; Sister    Brother/     Mother/     Sister/    Osteoporosis / Sister    Sister/          Social History  Finding Status Start/Stop Quantity Notes   Alcohol Never --/-- --  never drinks alcohol    Exercises regularly --  --/-- --  occasionally   Recreational Drug Use Never --/-- --  never used   Second hand smoke exposure Unknown --/-- --  yes   Tobacco Current every day 18/-- 0.5 PPD smokes less than 1 pack per day, for 20 years, never uses other tobacco products  SMOKER. ABOUT PACK A DAY.   Uses seatbelts --  --/-- --  yes         Immunizations  NameDate Admin Mfg Trade Name Lot Number Route Inj VIS Given VIS Publication   [id number]/23/2018 [facility] Fluzone Quadrivalent [id number] IM  10/23/2018 08/07/2015   Comments: NDC [id number]         Review of Systems  · Constitutional  o Admits  o : fatigue  · HENT  o Denies  o : vertigo, recent head injury  · Cardiovascular  o Denies  o : chest pain, irregular heart beats  · Respiratory  o Denies  o : shortness of breath, productive cough  · Gastrointestinal  o Denies  o : nausea, vomiting  · Musculoskeletal  o Admits  o : joint pain  o Denies  o : joint swelling, limitation of motion  · Endocrine  o Denies  o : cold intolerance, heat intolerance  · Psychiatric  o Denies  o : suicidal ideation, homicidal ideation  · Heme-Lymph  o Denies  o : petechiae, lymph node enlargement or tenderness      Vitals  Date Time BP Position Site L\R Cuff Size HR RR TEMP(F) WT  HT  BMI kg/m2 BSA m2 O2 Sat        11/20/2018 08:28 /78 Sitting    82 - R  97 132lbs 1oz    96 %           Physical Examination  · Constitutional  o Appearance  o : well developed, well-nourished, in no acute distress  · Head and Face  o HEENT  o : Unremarkable  · Eyes  o Conjunctivae  o : conjunctivae normal  · Neck  o Inspection/Palpation  o : supple  o Thyroid  o : no thyromegaly  · Respiratory  o Respiratory Effort  o : breathing unlabored  o Auscultation of Lungs  o : clear to ascultation  · Cardiovascular  o Heart  o :   § Auscultation of Heart  § : regular rate and rhythm  o Peripheral Vascular System  o :   § Extremities  § : no edema  · Lymphatic  o Neck  o : no lymphadenopathy  present  · Musculoskeletal  o General  o :   § General Musculoskeletal  § : No joint swelling or deformity., Muscle tone, strength, and development grossly normal.  · Skin and Subcutaneous Tissue  o General Inspection  o : skin turgor normal, texture normal  · Neurologic  o Gait and Station  o :   § Gait Screening  § : normal gait  · Psychiatric  o Mood and Affect  o : mood normal, affect appropriate          Assessment  · Vitamin B12 deficiency     266.2/E53.8      Plan  · Orders  o ACO-39: Current medications updated and reviewed () - - 11/20/2018  o Vitamin B12 Injection () - 266.2/E53.8 - 11/20/2018   patient brought own serum. LOT: [id number] EXP: 07/2020 NDC: 09774-237-24  o IM - Injection Fee [facility] (72191) - 266.2/E53.8 - 11/20/2018  · Instructions  o Patient was educated/instructed on their diagnosis, treatment and medications prior to discharge from the clinic today.  o Call the office with any concerns or questions.  o Chronic conditions reviewed and taken into consideration for today's treatment plan.  · Disposition  o Call or Return if symptoms worsen or persist.  o Return Visit Request in/on 1 month +/- 2 days (0103).            Electronically Signed by: LIVE Lazaro -Author on November 20, 2018 08:48:36 AM

## 2021-05-07 NOTE — PROGRESS NOTES
Progress Note      Patient Name: Faith Alegria   Patient ID: [id number]   Sex: Female   YOB: 1955    Primary Care Provider: Rosalba MANCINI   Referring Provider: Rosalba MANCINI    Visit Date: October 23, 2018    Provider: LIVE Lazaro   Location: [facility]   Location Address: [address] [place] [address]  [id number]   Location Phone: [phone]          Chief Complaint     PATIENT IS HERE FOR HER FLU AND B12 SHOT.  SHE ALSO WANTED YOU TO LOOK AT HER FOOT AGAIN.       History Of Present Illness  Faith Alegria is a 63 year old /White female who presents for evaluation and treatment of:      B12 shot due today     flu shot today    then plantar wart--really getting smaller and needs treated once again today--       Past Medical History  Disease Name Date Onset Notes   Arthritis --  --    Cataract --  --    GERD (gastroesophageal reflux disease) --  --    Hyperlipidemia --  --          Past Surgical History  Procedure Name Date Notes   Cataract surgery --  --    Foot surgery --  --    Hysterectomy --  --    Hysterectomy (due to cancer) --  --    Low Back Disc --  --    Shoulder surgery --  --          Medication List  Name Date Started Instructions   cyanocobalamin (vitamin B-12) 1,000 mcg/mL injection solution 07/02/2018 inject 1 milliliter (1,000 mcg) by intramuscular route once a month for 30 days         Allergy List  Allergen Name Date Reaction Notes   NO KNOWN DRUG ALLERGIES --  --  --          Family Medical History  Disease Name Relative/Age Notes   Alcohol Abuse / Father; Grandfather (maternal); Grandfather (paternal); Brother    Brother/     Father/     Grandfather (maternal)/     Grandfather (paternal)/    Depression / Mother; Brother; Sister    Brother/     Mother/     Sister/    Hyperlipidemia / Mother; Sister    Mother/     Sister/    Hypertension / Mother; Brother; Sister    Brother/     Mother/      "Sister/    Osteoporosis / Sister    Sister/          Social History  Finding Status Start/Stop Quantity Notes   Alcohol Never --/-- --  never drinks alcohol   Exercises regularly --  --/-- --  occasionally   Recreational Drug Use Never --/-- --  never used   Second hand smoke exposure Unknown --/-- --  yes   Tobacco Current - status unknown --/-- 0.5 PPD smokes less than 1 pack per day, for 20 years, never uses other tobacco products  SMOKER. ABOUT PACK A DAY.   Uses seatbelts --  --/-- --  yes         Review of Systems  · Constitutional  o Admits  o : fatigue  · HENT  o Denies  o : vertigo, recent head injury  · Cardiovascular  o Denies  o : chest pain, irregular heart beats  · Respiratory  o Denies  o : shortness of breath, productive cough  · Gastrointestinal  o Denies  o : nausea, vomiting  · Integument  o Admits  o : additional integumentary symptoms except as noted in the HPI  · Psychiatric  o Denies  o : suicidal ideation, homicidal ideation  · Heme-Lymph  o Denies  o : petechiae, lymph node enlargement or tenderness  · Allergic-Immunologic  o Denies  o : frequent illnesses      Vitals  Date Time BP Position Site L\R Cuff Size HR RR TEMP(F) WT  HT  BMI kg/m2 BSA m2 O2 Sat        10/23/2018 07:58 /74 Sitting    103 - R  96.4 132lbs 3oz 4'  8\" 29.64 1.54 97 %           Physical Examination  · Constitutional  o Appearance  o : well developed, well-nourished, in no acute distress  · Eyes  o Conjunctivae  o : conjunctivae normal  · Neck  o Inspection/Palpation  o : supple  o Thyroid  o : no thyromegaly  · Respiratory  o Respiratory Effort  o : breathing unlabored  o Auscultation of Lungs  o : clear to ascultation  · Cardiovascular  o Heart  o :   § Auscultation of Heart  § : regular rate and rhythm  o Peripheral Vascular System  o :   § Extremities  § : no edema  · Lymphatic  o Neck  o : no lymphadenopathy present  · Musculoskeletal  o General  o :   § General Musculoskeletal  § : No joint swelling or " deformity., Muscle tone, strength, and development grossly normal.  · Skin and Subcutaneous Tissue  o General Inspection  o : skin turgor normal, texture normal--right foot plantar wart almost completely gone--applied the liquid nitrogen approx. 30 seconds total and tolerated wel  · Neurologic  o Gait and Station  o :   § Gait Screening  § : normal gait  · Psychiatric  o Mood and Affect  o : mood normal, affect appropriate          Assessment  · Need for influenza vaccination     V04.81/Z23  · Vitamin B12 deficiency     266.2/E53.8  · Plantar wart     078.12/B07.0      Plan  · Orders  o IM - Injection Fee [facility] (27944) - 266.2/E53.8 - 10/23/2018  o Immunization Admin Fee (Single) ([facility]) (21349) - V04.81/Z23 - 10/23/2018   PATIENT BROUGHT HER OWN B12  o ACO-14: Influenza immunization administered or previously received () - - 10/23/2018  o ACO-39: Current medications updated and reviewed () - - 10/23/2018  o Fluzone Quadrivalent Vaccine, age 3+ (04057) - V04.81/Z23 - 10/23/2018   Vaccine - Influenza; Dose: 0.5; Site: Right Upper Arm; Route: Intramuscular; Date: 10/23/2018 08:30:00; Exp: 06/30/2019; Lot: [id number]; Mfg: [employer] pasteur; TradeName: Fluzone Quadrivalent; Administered By: Anna Harmon MA; Comment: NDC [id number]  · Instructions  o Patient was educated/instructed on their diagnosis, treatment and medications prior to discharge from the clinic today.  o Call the office with any concerns or questions.  o Chronic conditions reviewed and taken into consideration for today's treatment plan.  · Disposition  o Call or Return if symptoms worsen or persist.            Electronically Signed by: Anna Harmon MA -Author on October 29, 2018 12:08:31 PM  Electronically Co-signed by: LIVE Lazaro -Reviewer on October 29, 2018 12:18:20 PM

## 2021-05-07 NOTE — PROGRESS NOTES
Progress Note      Patient Name: Faith Alegria   Patient ID: [id number]   Sex: Female   YOB: 1955    Primary Care Provider: Rosalba MANCINI   Referring Provider: Rosalba MANCINI    Visit Date: January 16, 2019    Provider: LIVE Lazaro   Location: [facility]   Location Address: [address] [place] [address]  [id number]   Location Phone: [phone]          Chief Complaint     PATIENT IS HERE B-12 SHOT.       History Of Present Illness  Faith Alegria is a 63 year old /White female who presents for evaluation and treatment of:      B12 sot --last one was Nov 2018--this was a hard [place]-due to their first one of losing Neri in Feb 2018--her grandson--       Past Medical History  Disease Name Date Onset Notes   Arthritis --  --    Cataract --  --    GERD (gastroesophageal reflux disease) --  --    Hyperlipidemia --  --          Past Surgical History  Procedure Name Date Notes   Cataract surgery --  --    Foot surgery --  --    Hysterectomy --  --    Hysterectomy (due to cancer) --  --    Low Back Disc --  --    Shoulder surgery --  --          Medication List  Name Date Started Instructions   cyanocobalamin (vitamin B-12) 1,000 mcg/mL injection solution 07/02/2018 inject 1 milliliter (1,000 mcg) by intramuscular route once a month for 30 days         Allergy List  Allergen Name Date Reaction Notes   NO KNOWN DRUG ALLERGIES --  --  --          Family Medical History  Disease Name Relative/Age Notes   Alcohol Abuse / Father; Grandfather (maternal); Grandfather (paternal); Brother    Brother/     Father/     Grandfather (maternal)/     Grandfather (paternal)/    Depression / Mother; Brother; Sister    Brother/     Mother/     Sister/    Hyperlipidemia / Mother; Sister    Mother/     Sister/    Hypertension / Mother; Brother; Sister    Brother/     Mother/     Sister/    Osteoporosis / Sister    Sister/          Social  "History  Finding Status Start/Stop Quantity Notes   Alcohol Never --/-- --  never drinks alcohol   Exercises regularly --  --/-- --  occasionally   Recreational Drug Use Never --/-- --  never used   Second hand smoke exposure Unknown --/-- --  yes   Tobacco Current every day 18/-- 0.5 PPD smokes less than 1 pack per day, for 20 years, never uses other tobacco products  SMOKER. ABOUT PACK A DAY.   Uses seatbelts --  --/-- --  yes         Immunizations  NameDate Admin Mfg Trade Name Lot Number Route Inj VIS Given VIS Publication   [id number]/23/2018 [facility] Fluzone Quadrivalent [id number] IM  10/23/2018 08/07/2015   Comments: NDC [id number]         Review of Systems  · Constitutional  o Admits  o : fatigue  · HENT  o Denies  o : vertigo, recent head injury  · Cardiovascular  o Denies  o : chest pain, irregular heart beats  · Respiratory  o Denies  o : shortness of breath, productive cough  · Gastrointestinal  o Denies  o : nausea, vomiting  · Neurologic  o Denies  o : tingling or numbness  · Musculoskeletal  o Denies  o : joint swelling, limitation of motion  · Heme-Lymph  o Denies  o : petechiae, lymph node enlargement or tenderness  · Allergic-Immunologic  o Denies  o : frequent illnesses      Vitals  Date Time BP Position Site L\R Cuff Size HR RR TEMP(F) WT  HT  BMI kg/m2 BSA m2 O2 Sat        01/16/2019 09:46 /60 Sitting    96 - R  98 130lbs 6oz 4'  8\" 29.23 1.53 94 %           Physical Examination  · Constitutional  o Appearance  o : well developed, well-nourished, in no acute distress  · Head and Face  o HEENT  o : Unremarkable  · Eyes  o Conjunctivae  o : conjunctivae normal  · Neck  o Inspection/Palpation  o : supple  o Thyroid  o : no thyromegaly  · Respiratory  o Respiratory Effort  o : breathing unlabored  o Auscultation of Lungs  o : clear to ascultation  · Cardiovascular  o Heart  o :   § Auscultation of Heart  § : regular rate and rhythm  o Peripheral Vascular System  o :   § Extremities  § : no " edema  · Lymphatic  o Neck  o : no lymphadenopathy present  · Musculoskeletal  o General  o :   § General Musculoskeletal  § : No joint swelling or deformity., Muscle tone, strength, and development grossly normal.  · Skin and Subcutaneous Tissue  o General Inspection  o : skin turgor normal, texture normal  · Neurologic  o Gait and Station  o :   § Gait Screening  § : normal gait  · Psychiatric  o Mood and Affect  o : mood normal, affect appropriate          Assessment  · Vitamin B12 deficiency     266.2/E53.8      Plan  · Orders  o IM - Injection Fee [facility] ([id number]) - 266.2/E53.8 - 01/16/2019   PT BROUGHT HER OWN SERUM LOT#[id number] EXP:7-20 NDC [id number]  o B12 Folate levels (B12FO) - 266.2/E53.8 - 01/16/2019  o ACO-39: Current medications updated and reviewed () - - 01/16/2019  · Medications  o cyanocobalamin (vitamin B-12) 1,000 mcg/mL injection solution   SIG: inject 1 milliliter (1,000 mcg) by intramuscular route once a month   DISP: (1) 1 ml vial with 5 refills  Adjusted on 01/16/2019     · Instructions  o Patient was educated/instructed on their diagnosis, treatment and medications prior to discharge from the clinic today.            Electronically Signed by: LIVE Lazaro -Author on January 16, 2019 12:11:28 PM

## 2021-05-07 NOTE — PROGRESS NOTES
Progress Note      Patient Name: Faith Alegria   Patient ID: [id number]   Sex: Female   YOB: 1955        Visit Date: August 27, 2018    Provider: LIVE Lazaro   Location: [facility]   Location Address: [address] [place] [address]  [id number]   Location Phone: [phone]          Chief Complaint     PATIENT IS HERE FOR HER PLANTAR WART AND HER B12 SHOT.       History Of Present Illness  Faith Alegria is a 63 year old /White female who presents for evaluation and treatment of:      plantar wart treatment to the foot--    B12 shot--pt brought in the serum     then the pt states after losing her grandson--Neri--and it hit the 6 month sivakumar--and they are still grieving--and looks for the small things in life to rejoice over--and knowing where Neri is--he was almost 19 y/o       Past Medical History  Disease Name Date Onset Notes   Arthritis --  --    Cataract --  --    GERD (gastroesophageal reflux disease) --  --    Hyperlipidemia --  --          Past Surgical History  Procedure Name Date Notes   Cataract surgery --  --    Foot surgery --  --    Hysterectomy --  --    Hysterectomy (due to cancer) --  --    Low Back Disc --  --    Shoulder surgery --  --          Medication List  Name Date Started Instructions   cyanocobalamin (vitamin B-12) 1,000 mcg/mL injection solution 07/02/2018 inject 1 milliliter (1,000 mcg) by intramuscular route once a month for 30 days         Allergy List  Allergen Name Date Reaction Notes   NO KNOWN DRUG ALLERGIES --  --  --          Family Medical History  Disease Name Relative/Age Notes   Alcohol Abuse / Father; Grandfather (maternal); Grandfather (paternal); Brother    Brother/     Father/     Grandfather (maternal)/     Grandfather (paternal)/    Depression / Mother; Brother; Sister    Brother/     Mother/     Sister/    Hyperlipidemia / Mother; Sister    Mother/     Sister/    Hypertension / Mother;  "Brother; Sister    Brother/     Mother/     Sister/    Osteoporosis / Sister    Sister/          Social History  Finding Status Start/Stop Quantity Notes   Alcohol Never --/-- --  never drinks alcohol   Exercises regularly --  --/-- --  occasionally   Recreational Drug Use Never --/-- --  never used   Second hand smoke exposure Unknown --/-- --  yes   Tobacco Current - status unknown --/-- 0.5 PPD smokes less than 1 pack per day, for 20 years, never uses other tobacco products  SMOKER. ABOUT PACK A DAY.   Uses seatbelts --  --/-- --  yes         Review of Systems  · Constitutional  o Admits  o : fatigue  o Denies  o : fever  · HENT  o Denies  o : vertigo, recent head injury  · Cardiovascular  o Denies  o : chest pain, irregular heart beats  · Respiratory  o Denies  o : shortness of breath, productive cough  · Gastrointestinal  o Denies  o : nausea, vomiting  · Genitourinary  o Denies  o : dysuria, urinary retention  · Integument  o Admits  o : additional integumentary symptoms except as noted in the HPI  o Denies  o : hair growth change, new skin lesions, reviewed and unchanged  · Neurologic  o Denies  o : altered mental status, seizures, additional neurologic symptoms except as noted in the HPI  · Musculoskeletal  o Denies  o : joint swelling, limitation of motion  · Endocrine  o Denies  o : cold intolerance, heat intolerance  · Heme-Lymph  o Denies  o : petechiae, lymph node enlargement or tenderness  · Allergic-Immunologic  o Denies  o : frequent illnesses      Vitals  Date Time BP Position Site L\R Cuff Size HR RR TEMP(F) WT  HT  BMI kg/m2 BSA m2 O2 Sat        08/27/2018 08:36 /68 Sitting    92 - R  97.1 131lbs 9oz 4'  8\" 29.5 1.54 96 %           Physical Examination  · Constitutional  o Appearance  o : well developed, well-nourished, in no acute distress  · Head and Face  o HEENT  o : Unremarkable  · Eyes  o Conjunctivae  o : conjunctivae normal  · Neck  o Inspection/Palpation  o : " supple  o Thyroid  o : no thyromegaly  · Respiratory  o Respiratory Effort  o : breathing unlabored  · Cardiovascular  o Peripheral Vascular System  o :   § Extremities  § : no edema  · Gastrointestinal  o Abdominal Examination  o :   § Abdomen  § : soft nontender   · Lymphatic  o Neck  o : no lymphadenopathy present  · Musculoskeletal  o General  o :   § General Musculoskeletal  § : No joint swelling or deformity., Muscle tone, strength, and development grossly normal.  · Skin and Subcutaneous Tissue  o General Inspection  o : skin turgor normal, texture normal--applied liquid nitrogen approx. 30-40 sec to the right bottom of the foot plantar wart--it is getting flatter and smaller--tolerated well   · Neurologic  o Gait and Station  o :   § Gait Screening  § : normal gait  · Psychiatric  o Mood and Affect  o : mood normal, affect appropriate          Assessment  · Vitamin B12 deficiency     266.2/E53.8  · Plantar wart of right foot     078.12/B07.0      Plan  · Orders  o IM - Injection Fee [facility] (30648) - 266.2/E53.8 - 08/27/2018  o ACO-39: Current medications updated and reviewed () - - 08/27/2018  · Instructions  o Patient was educated/instructed on their diagnosis, treatment and medications prior to discharge from the clinic today.  o Patient instructed to seek medical attention urgently for new or worsening symptoms.  o Call the office with any concerns or questions.  o Chronic conditions reviewed and taken into consideration for today's treatment plan.  · Disposition  o Call or Return if symptoms worsen or persist.  o Return Visit Request in/on 2 weeks +/- 2 days (7021).            Electronically Signed by: LIVE Lazaro -Author on August 27, 2018 09:07:11 AM

## 2021-05-07 NOTE — PROGRESS NOTES
Progress Note      Patient Name: Faith Alegria   Patient ID: [id number]   Sex: Female   YOB: 1955        Visit Date: August 13, 2018    Provider: VEL Armenta   Location: [facility]   Location Address: [address] [place] [address]  [id number]   Location Phone: [phone]          Chief Complaint     follow up on planters wart       History Of Present Illness  Faith Alegria is a 63 year old /White female who presents for evaluation and treatment of:      follow up on plantar wart--still having pain with walking       Past Medical History  Disease Name Date Onset Notes   Arthritis --  --    Cataract --  --    GERD (gastroesophageal reflux disease) --  --    Hyperlipidemia --  --          Past Surgical History  Procedure Name Date Notes   Cataract surgery --  --    Foot surgery --  --    Hysterectomy --  --    Hysterectomy (due to cancer) --  --    Low Back Disc --  --    Shoulder surgery --  --          Medication List  Name Date Started Instructions   cyanocobalamin (vitamin B-12) 1,000 mcg/mL injection solution 07/02/2018 inject 1 milliliter (1,000 mcg) by intramuscular route once a month for 30 days         Allergy List  Allergen Name Date Reaction Notes   NO KNOWN DRUG ALLERGIES --  --  --          Family Medical History  Disease Name Relative/Age Notes   Alcohol Abuse / Father; Grandfather (maternal); Grandfather (paternal); Brother    Brother/     Father/     Grandfather (maternal)/     Grandfather (paternal)/    Depression / Mother; Brother; Sister    Brother/     Mother/     Sister/    Hyperlipidemia / Mother; Sister    Mother/     Sister/    Hypertension / Mother; Brother; Sister    Brother/     Mother/     Sister/    Osteoporosis / Sister    Sister/          Social History  Finding Status Start/Stop Quantity Notes   Alcohol Never --/-- --  never drinks alcohol   Exercises regularly --  --/-- --  occasionally   Recreational Drug  Use Never --/-- --  never used   Second hand smoke exposure Unknown --/-- --  yes   Tobacco Current - status unknown --/-- 0.5 PPD smokes less than 1 pack per day, for 20 years, never uses other tobacco products  SMOKER. ABOUT PACK A DAY.   Uses seatbelts --  --/-- --  yes         Review of Systems  · Constitutional  o Admits  o : good general health lately  · Integument  o Denies  o : new skin lesions, changes to existing skin lesions or moles  · Neurologic  o Denies  o : tingling or numbness  · Musculoskeletal  o Admits  o : foot pain      Vitals  Date Time BP Position Site L\R Cuff Size HR RR TEMP(F) WT  HT  BMI kg/m2 BSA m2 O2 Sat HC       08/13/2018 09:50 /80 Sitting    95 - R  96.6 134lbs 0oz    96 %           Physical Examination  · Constitutional  o Appearance  o : well developed, well-nourished, in no acute distress  · Respiratory  o Respiratory Effort  o : breathing unlabored  o Auscultation of Lungs  o : clear to ascultation  · Cardiovascular  o Heart  o :   § Auscultation of Heart  § : regular rate and rhythm  o Peripheral Vascular System  o :   § Extremities  § : no edema  · Musculoskeletal  o General  o :   § General Musculoskeletal  § : No joint swelling or deformity. Muscle tone, strength, and development grossly normal.  · Skin and Subcutaneous Tissue  o General Inspection  o : right foot plantar wart--approx. 2 mm in diameter after removing calloused area with 15 blade scalpel. Liquid nitrogen applied in 10s bursts x5  · Neurologic  o Gait and Station  o :   § Gait Screening  § : normal gait  · Psychiatric  o Mood and Affect  o : mood normal, affect appropriate              Assessment  · Plantar wart of right foot     078.12/B07.0      Plan  · Orders  o ACO-39: Current medications updated and reviewed () - - 08/13/2018  · Instructions  o Patient was educated/instructed on their diagnosis, treatment and medications prior to discharge from the clinic today.  · Disposition  o Return Visit  Request in/on 2 weeks +/- 2 days (5463).            Electronically Signed by: VEL Armenta -Author on August 13, 2018 10:17:55 AM

## 2021-05-07 NOTE — PROGRESS NOTES
Progress Note      Patient Name: Faith Alegria   Patient ID: [id number]   Sex: Female   YOB: 1955        Visit Date: June 4, 2018    Provider: LIVE Lazaro   Location: [facility]   Location Address: [address] [place] [address]  [id number]   Location Phone: [phone]          Chief Complaint     PATIENT IS HERE FOR A POSSIBLE PLANTAR WART ON HER BOTTOM RIGHT FOOT.       History Of Present Illness  Faith Alegria is a 62 year old /White female who presents for evaluation and treatment of:      pt states was a bad yr--and 18 y/o grandson was having HAs and nausea and vomiting --they thought was a gleoblastoma and then later was Dx with--pinel blastoma--and they did the first surgery for 5 hrs--and they were told very vascular--and then they came back as a very rare very aggressive brain cancer--    pt shared for approx. 20-30 min very tearfully --regarding all they walked through--    and then the pt here for right foot plantar wart--       Allergy List  Allergen Name Date Reaction Notes   NO KNOWN DRUG ALLERGIES --  --  --          Social History  Finding Status Start/Stop Quantity Notes   Tobacco --  --/-- --  SMOKER. ABOUT PACK A DAY.         Review of Systems  · Constitutional  o Denies  o : fever  · HENT  o Denies  o : vertigo, recent head injury  · Cardiovascular  o Denies  o : chest pain, irregular heart beats  · Respiratory  o Denies  o : shortness of breath, productive cough  · Gastrointestinal  o Denies  o : nausea, vomiting  · Genitourinary  o Denies  o : dysuria, urinary retention  · Integument  o Admits  o : new skin lesions, additional integumentary symptoms except as noted in the HPI  o Denies  o : hair growth change  · Neurologic  o Denies  o : altered mental status, seizures  · Musculoskeletal  o Denies  o : joint swelling, limitation of motion  · Endocrine  o Denies  o : cold intolerance, heat  "intolerance  · Psychiatric  o Denies  o : suicidal ideation, homicidal ideation  · Heme-Lymph  o Denies  o : petechiae, lymph node enlargement or tenderness  · Allergic-Immunologic  o Denies  o : frequent illnesses      Vitals  Date Time BP Position Site L\R Cuff Size HR RR TEMP(F) WT  HT  BMI kg/m2 BSA m2 O2 Sat        06/04/2018 02:42 /66 Sitting    102 - R  96.8 136lbs 0oz 4'  8\" 30.49 1.56 96 %           Physical Examination  · Constitutional  o Appearance  o : well developed, well-nourished, in no acute distress  · Eyes  o Conjunctivae  o : conjunctivae normal  · Neck  o Inspection/Palpation  o : supple  o Thyroid  o : no thyromegaly  · Respiratory  o Respiratory Effort  o : breathing unlabored  o Auscultation of Lungs  o : clear to ascultation  · Cardiovascular  o Heart  o :   § Auscultation of Heart  § : regular rate and rhythm  o Peripheral Vascular System  o :   § Extremities  § : no edema  · Lymphatic  o Neck  o : no lymphadenopathy present  · Musculoskeletal  o General  o :   § General Musculoskeletal  § : No joint swelling or deformity., Muscle tone, strength, and development grossly normal.  · Skin and Subcutaneous Tissue  o General Inspection  o : skin turgor normal, texture normal--to the bottom of the right foot a small area very tender and no redness and was hard and appeared like a plantar wart appearance--and then applied the liquid nitrogen approx. 30-40 seconds total and pt tolerated well   · Neurologic  o Gait and Station  o :   § Gait Screening  § : normal gait  · Psychiatric  o Mood and Affect  o : mood normal, affect appropriate              Assessment  · Plantar wart of right foot     078.12/B07.0  · Grief     309.0/F43.20      Plan  · Orders  o ACO-14: Influenza immunization administered or previously received () - - 06/04/2018  o ACO-18: Negative screen for clinical depression using a standardized tool () - - 06/04/2018  o ACO-39: Current medications updated and reviewed " () - - 06/04/2018  · Instructions  o Patient was educated/instructed on their diagnosis, treatment and medications prior to discharge from the clinic today.  o Patient instructed to seek medical attention urgently for new or worsening symptoms.  o Call the office with any concerns or questions.  · Disposition  o Call or Return if symptoms worsen or persist.  o Return Visit Request in/on 2 weeks +/- 2 days (0640).            Electronically Signed by: LIVE Lazaro -Author on June 4, 2018 08:15:28 PM

## 2021-05-07 NOTE — PROGRESS NOTES
Progress Note      Patient Name: Faith Alegria   Patient ID: [id number]   Sex: Female   YOB: 1955    Primary Care Provider: Rosalba MANCINI   Referring Provider: Rosalba MANCINI    Visit Date: July 8, 2019    Provider: LIVE Lazaro   Location: [facility] [place]   Location Address: [address] [place] [address]   [place]  [id number]   Location Phone: [phone]          Chief Complaint     PATIENT IS HERE FOR REFILLS AND SHE BROUGHT HER B12 SERUM FOR A SHOT TODAY.       History Of Present Illness  Faith Alegria is a 63 year old /White female who presents for evaluation and treatment of:      pt here for the refills on the B12 and pt fasting and needs labs-    then pt will turn 64 yrs old--this month--and will F/U after this for her welcome to medicare PE and paperwork    sees gastro regular--and sees him in Oct--and Hx of diverticulitis--and F/U at the yr sivakumar-last cscope was 2 ago--at [facility]--2/2017    missed her mammo for a few yrs--2016 and BD then     and pt admits when lost her grand son--Neri to the brain tumor--and was 1 yr in Feb 2019--and doing pretty good    and then has a granddaughter pregnant with twins--very excited     still smoking-smokes 1/2 PPD--down from the 1 PPD--and smoked approx. since 17-18 yrs old--and did quit x 7 yrs then again for another 7 yrs then restarted--and then trying to quit this time again-and restarted approx. 12 yrs now--with a 30 pack yrs total--with the 14 yrs taken out--           Past Medical History  Disease Name Date Onset Notes   Arthritis --  --    Cataract --  --    GERD (gastroesophageal reflux disease) --  --    Hyperlipidemia --  --          Past Surgical History  Procedure Name Date Notes   Cataract surgery --  --    Foot surgery --  --    Hysterectomy --  --    Hysterectomy (due to cancer) --  --    Low Back Disc --  --    Shoulder surgery --  --          Medication List  Name Date  Started Instructions   cyanocobalamin (vitamin B-12) 1,000 mcg/mL injection solution 07/08/2019 inject 1 milliliter (1,000 mcg) by intramuscular route once a month for 30 days         Allergy List  Allergen Name Date Reaction Notes   NO KNOWN DRUG ALLERGIES --  --  --          Family Medical History  Disease Name Relative/Age Notes   Depression Brother/  Mother/  Sister/   Mother; Brother; Sister   Hyperlipidemia Mother/  Sister/   Mother; Sister   Hypertension Brother/  Mother/  Sister/   Mother; Brother; Sister   Osteoporosis Sister/   Sister   Alcohol Abuse Brother/  Father/  Grandfather (maternal)/  Grandfather (paternal)/   Father; Grandfather (maternal); Grandfather (paternal); Brother         Social History  Finding Status Start/Stop Quantity Notes   Alcohol Never --/-- --  never drinks alcohol   Exercises regularly --  --/-- --  occasionally   Recreational Drug Use Never --/-- --  never used   Second hand smoke exposure Unknown --/-- --  yes   Tobacco Current every day 17/-- 0.5 PPD smokes less than 1 pack per day, for 20 years, never uses other tobacco products  SMOKER. ABOUT PACK A DAY.   Uses seatbelts --  --/-- --  yes         Immunizations  NameDate Admin Mfg Trade Name Lot Number Route Inj VIS Given VIS Publication   [id number]/23/2018 [facility] FLUARIX [id number] IM  10/23/2018 08/07/2015   Comments: NDC [id number]         Review of Systems  · Constitutional  o Admits  o : fatigue  o Denies  o : fever  · HENT  o Denies  o : vertigo, recent head injury, dysphagia  · Cardiovascular  o Denies  o : chest pain, irregular heart beats  · Respiratory  o Denies  o : shortness of breath, productive cough  · Gastrointestinal  o Admits  o : hemorrhoids  o Denies  o : nausea, vomiting  · Genitourinary  o Denies  o : dysuria, urinary retention  · Integument  o Denies  o : hair growth change, new skin lesions  · Neurologic  o Denies  o : altered mental status, seizures  · Musculoskeletal  o Admits  o : joint pain, neck  "pain, back pain, knee pain, foot pain, additional musculoskeletal symptoms except as noted in the HPI  o Denies  o : joint swelling, limitation of motion  · Endocrine  o Denies  o : cold intolerance, heat intolerance  · Psychiatric  o Denies  o : suicidal ideation, homicidal ideation  · Heme-Lymph  o Denies  o : petechiae, lymph node enlargement or tenderness  · Allergic-Immunologic  o Denies  o : frequent illnesses      Vitals  Date Time BP Position Site L\R Cuff Size HR RR TEMP (F) WT  HT  BMI kg/m2 BSA m2 O2 Sat        07/08/2019 08:36 /62 Sitting    103 - R  97 127lbs 2oz 4'  8\" 28.5 1.51 96 %          Physical Examination  · Constitutional  o Appearance  o : well developed, well-nourished, in no acute distress  · Head and Face  o HEENT  o : Unremarkable  · Eyes  o Conjunctivae  o : conjunctivae normal  · Neck  o Inspection/Palpation  o : supple  o Thyroid  o : no thyromegaly  · Respiratory  o Respiratory Effort  o : breathing unlabored  o Auscultation of Lungs  o : clear to ascultation  · Cardiovascular  o Heart  o :   § Auscultation of Heart  § : regular rate and rhythm  o Peripheral Vascular System  o :   § Extremities  § : no edema  · Breasts  o Inspection of Breasts  o : breasts symmetrical, no skin changes, no discharge present, no deformities present  · Gastrointestinal  o Abdominal Examination  o :   § Abdomen  § : soft nontender  · Lymphatic  o Neck  o : no lymphadenopathy present  · Musculoskeletal  o General  o :   § General Musculoskeletal  § : No joint swelling or deformity., Muscle tone, strength, and development grossly normal.  · Skin and Subcutaneous Tissue  o General Inspection  o : skin turgor normal, texture normal  · Neurologic  o Gait and Station  o :   § Gait Screening  § : normal gait  · Psychiatric  o Mood and Affect  o : mood normal, affect appropriate          Assessment  · Visit for screening mammogram     V76.12/Z12.31  · Vitamin B12 " deficiency     266.2/E53.8  · Fatigue     780.79/R53.83  · Tobacco abuse counseling       Tobacco abuse counseling     V65.42/Z71.6  · Osteopenia     733.90/M85.80  · Bone pain     733.90/M89.8X9  · Screening cholesterol level     V77.91/Z13.220  · Encounter for hepatitis C screening test for low risk patient     V73.89/Z11.59      Plan  · Orders  o Screening Mammogram 2D Bilateral (25035, ) - V76.12/Z12.31 - 07/08/2019  o IM - Injection Fee [facility] (87027) - 266.2/E53.8 - 07/08/2019   PT BROUGHT SERUM LOT#[id number] EXP:2-21 NDC [id number]  o B12 Folate levels (B12FO) - 266.2/E53.8 - 07/08/2019  o CBC with Auto Diff [facility] (28303) - 780.79/R53.83 - 07/08/2019  o Thyroid Profile (THYII) - 780.79/R53.83 - 07/08/2019  o Iron panel (iron, TIBC, transferrin saturation) (81765, 03588, 05733) - 780.79/R53.83 - 07/08/2019  o Smoking cessation counseling, 3-10 minutes [facility] (37143) - V65.42/Z71.6 - 07/08/2019  o ACO-17: Screened for tobacco use AND received tobacco cessation intervention (4004F) - V65.42/Z71.6 - 07/08/2019  o CMP [facility] (33965) - 780.79/R53.83, 733.90/M89.8X9 - 07/08/2019  o Lipid Panel [facility] (52037) - V77.91/Z13.220 - 07/08/2019  o Urinalysis with Reflex Microscopy if abnormal ([facility]) (97270) - 266.2/E53.8, 780.79/R53.83, 733.90/M89.8X9 - 07/08/2019  o Vitamin D (25-Hydroxy) Level (37828) - 780.79/R53.83, 733.90/M85.80, 733.90/M89.8X9 - 07/08/2019  o ACO-39: Current medications updated and reviewed () - - 07/08/2019  o Ferritin ser/plas (39913) - 780.79/R53.83, 733.90/M89.8X9 - 07/08/2019  o Bone density scan (62636) - 733.90/M85.80 - 07/08/2019  o Hepatitis C antibody (58805) - V73.89/Z11.59 - 07/08/2019  · Medications  o cyanocobalamin (vitamin B-12) 1,000 mcg/mL injection solution   SIG: inject 1 milliliter (1,000 mcg) by intramuscular route once a month for 30 days   DISP: (1) 1 ml vial with 5 refills  Adjusted on 07/08/2019     · Instructions  o Tobacco and smoking cessation counseling for more than 3  minutes was completed.  o Take all medications as prescribed/directed.  o Patient was educated/instructed on their diagnosis, treatment and medications prior to discharge from the clinic today.  o Patient counseled to stop smoking.  o Patient instructed to seek medical attention urgently for new or worsening symptoms.  o Call the office with any concerns or questions.  o Chronic conditions reviewed and taken into consideration for today's treatment plan.  · Disposition  o Call or Return if symptoms worsen or persist.     pt will F/U later this month or next for the welcome to medicare PE             Electronically Signed by: LIVE Lazaro -Author on July 8, 2019 10:38:42 AM

## 2021-05-07 NOTE — PROGRESS NOTES
Progress Note      Patient Name: Faith Alegria   Patient ID: [id number]   Sex: Female   YOB: 1955        Visit Date: July 16, 2018    Provider: LIVE Lazaro   Location: [facility]   Location Address: [address] [place] [address]  [id number]   Location Phone: [phone]          Chief Complaint     PATIENT IS HERE FOR A F/U ON HER RIGHT FOOT.       History Of Present Illness  Faith Alegria is a 62 year old /White female who presents for evaluation and treatment of:      pt here for the wart Tx--of the right foot--and had 2 Tx's already --feels like it's forcing it to come to the surface       Past Medical History  Disease Name Date Onset Notes   Arthritis --  --    Cataract --  --    GERD (gastroesophageal reflux disease) --  --    Hyperlipidemia --  --          Past Surgical History  Procedure Name Date Notes   Cataract surgery --  --    Foot surgery --  --    Hysterectomy --  --    Hysterectomy (due to cancer) --  --    Low Back Disc --  --    Shoulder surgery --  --          Medication List  Name Date Started Instructions   cyanocobalamin (vitamin B-12) 1,000 mcg/mL injection solution 07/02/2018 inject 1 milliliter (1,000 mcg) by intramuscular route once a month for 30 days         Allergy List  Allergen Name Date Reaction Notes   NO KNOWN DRUG ALLERGIES --  --  --          Family Medical History  Disease Name Relative/Age Notes   Alcohol Abuse / Father; Grandfather (maternal); Grandfather (paternal); Brother    Brother/     Father/     Grandfather (maternal)/     Grandfather (paternal)/    Depression / Mother; Brother; Sister    Brother/     Mother/     Sister/    Hyperlipidemia / Mother; Sister    Mother/     Sister/    Hypertension / Mother; Brother; Sister    Brother/     Mother/     Sister/    Osteoporosis / Sister    Sister/          Social History  Finding Status Start/Stop Quantity Notes   Alcohol Never --/-- --  never  "drinks alcohol   Exercises regularly --  --/-- --  occasionally   Recreational Drug Use Never --/-- --  never used   Second hand smoke exposure Unknown --/-- --  yes   Tobacco Current - status unknown --/-- 0.5 PPD smokes less than 1 pack per day, for 20 years, never uses other tobacco products  SMOKER. ABOUT PACK A DAY.   Uses seatbelts --  --/-- --  yes         Review of Systems  · Constitutional  o Denies  o : fever  · HENT  o Denies  o : vertigo, recent head injury  · Cardiovascular  o Denies  o : chest pain, irregular heart beats  · Respiratory  o Denies  o : shortness of breath, productive cough  · Gastrointestinal  o Denies  o : nausea, vomiting  · Integument  o Admits  o : additional integumentary symptoms except as noted in the HPI  · Neurologic  o Denies  o : altered mental status, seizures  · Musculoskeletal  o Denies  o : joint swelling, limitation of motion  · Allergic-Immunologic  o Denies  o : frequent illnesses      Vitals  Date Time BP Position Site L\R Cuff Size HR RR TEMP(F) WT  HT  BMI kg/m2 BSA m2 O2 Sat        07/16/2018 08:57 /74 Sitting    100 - R  96.7 136lbs 0oz 4'  8\" 30.49 1.56 98 %           Physical Examination  · Constitutional  o Appearance  o : well developed, well-nourished, in no acute distress  · Eyes  o Conjunctivae  o : conjunctivae normal  · Neck  o Inspection/Palpation  o : supple  o Thyroid  o : no thyromegaly  · Respiratory  o Respiratory Effort  o : breathing unlabored  · Cardiovascular  o Peripheral Vascular System  o :   § Extremities  § : no edema  · Lymphatic  o Neck  o : no lymphadenopathy present  · Musculoskeletal  o General  o :   § General Musculoskeletal  § : No joint swelling or deformity., Muscle tone, strength, and development grossly normal.  · Skin and Subcutaneous Tissue  o General Inspection  o : skin turgor normal, texture normal--(+) plantar wart tenderness and rough to palpation of the outer bottom of the right foot and applied liquid nitrogen x " 30-40 seconds and tolerated well   · Neurologic  o Gait and Station  o :   § Gait Screening  § : normal gait  · Psychiatric  o Mood and Affect  o : mood normal, affect appropriate          Assessment  · Plantar wart of right foot     078.12/B07.0      Plan  · Orders  o ACO-39: Current medications updated and reviewed () - - 07/16/2018  · Instructions  o Patient was educated/instructed on their diagnosis, treatment and medications prior to discharge from the clinic today.  o Patient instructed to seek medical attention urgently for new or worsening symptoms.  o Call the office with any concerns or questions.  · Disposition  o Call or Return if symptoms worsen or persist.  o Return Visit Request in/on 2 weeks +/- 2 days ([id number]).            Electronically Signed by: LIVE Lazaro -Author on July 16, 2018 09:35:30 AM

## 2021-05-07 NOTE — PROGRESS NOTES
Progress Note      Patient Name: Faith Alegria   Patient ID: [id number]   Sex: Female   YOB: 1955    Primary Care Provider: Rosalba MANCINI   Referring Provider: Rosalba MANCINI    Visit Date: March 12, 2019    Provider: LIVE Lazaro   Location: [facility]   Location Address: [address] [place] [address]  [id number]   Location Phone: [phone]          Chief Complaint     b12 shot       History Of Present Illness  Faith Alegria is a 63 year old /White female who presents for evaluation and treatment of:      b12 shot--and pt on monthly-and then pt can always tell when is due for the shot about the 3-4th week fatigued again       Past Medical History  Disease Name Date Onset Notes   Arthritis --  --    Cataract --  --    GERD (gastroesophageal reflux disease) --  --    Hyperlipidemia --  --          Past Surgical History  Procedure Name Date Notes   Cataract surgery --  --    Foot surgery --  --    Hysterectomy --  --    Hysterectomy (due to cancer) --  --    Low Back Disc --  --    Shoulder surgery --  --          Medication List  Name Date Started Instructions   cyanocobalamin (vitamin B-12) 1,000 mcg/mL injection solution 01/16/2019 inject 1 milliliter (1,000 mcg) by intramuscular route once a month         Allergy List  Allergen Name Date Reaction Notes   NO KNOWN DRUG ALLERGIES --  --  --          Family Medical History  Disease Name Relative/Age Notes   Alcohol Abuse / Father; Grandfather (maternal); Grandfather (paternal); Brother    Brother/     Father/     Grandfather (maternal)/     Grandfather (paternal)/    Depression / Mother; Brother; Sister    Brother/     Mother/     Sister/    Hyperlipidemia / Mother; Sister    Mother/     Sister/    Hypertension / Mother; Brother; Sister    Brother/     Mother/     Sister/    Osteoporosis / Sister    Sister/          Social History  Finding Status Start/Stop Quantity  Notes   Alcohol Never --/-- --  never drinks alcohol   Exercises regularly --  --/-- --  occasionally   Recreational Drug Use Never --/-- --  never used   Second hand smoke exposure Unknown --/-- --  yes   Tobacco Current every day 18/-- 0.5 PPD smokes less than 1 pack per day, for 20 years, never uses other tobacco products  SMOKER. ABOUT PACK A DAY.   Uses seatbelts --  --/-- --  yes         Immunizations  NameDate Admin Mfg Trade Name Lot Number Route Inj VIS Given VIS Publication   [id number]/23/2018 [facility] Fluzone Quadrivalent [id number] IM  10/23/2018 08/07/2015   Comments: NDC [id number]         Review of Systems  · Constitutional  o Admits  o : fatigue  o Denies  o : fever  · HENT  o Denies  o : hearing loss or ringing, chronic sinus problem, swollen glands in neck  · Cardiovascular  o Denies  o : chest pain, palpitations (fast, fluttering, or skipping beats), swelling (feet, ankles, hands), shortness of breath while walking or lying flat  · Respiratory  o Denies  o : shortness of breath, asthma or wheezing, COPD  · Gastrointestinal  o Denies  o : ulcers, nausea or vomiting  · Allergic-Immunologic  o Denies  o : frequent illnesses      Vitals  Date Time BP Position Site L\R Cuff Size HR RR TEMP(F) WT  HT  BMI kg/m2 BSA m2 O2 Sat        03/12/2019 11:27 /80 Sitting    101 - R  97.8 130lbs 1oz    97 %           Physical Examination  · Constitutional  o Appearance  o : well developed, well-nourished, in no acute distress  · Respiratory  o Respiratory Effort  o : breathing unlabored  o Auscultation of Lungs  o : clear to auscultation  · Cardiovascular  o Heart  o :   § Auscultation of Heart  § : regular rate and rhythm  · Skin and Subcutaneous Tissue  o General Inspection  o : normal tone  · Psychiatric  o General  o : normal affect and calm          Assessment  · Vitamin B12 deficiency     266.2/E53.8      Plan  · Orders  o IM - Injection Fee [facility] ([id number]) - 266.2/E53.8 - 03/12/2019   pt serum lot [id number] exp  9/2020 ndc [id number]  o ACO-39: Current medications updated and reviewed () - - 03/12/2019  · Instructions  o Take all medications as prescribed/directed.  o Patient was educated/instructed on their diagnosis, treatment and medications prior to discharge from the clinic today.  o Patient instructed to seek medical attention urgently for new or worsening symptoms.  o Call the office with any concerns or questions.  o Chronic conditions reviewed and taken into consideration for today's treatment plan.  · Disposition  o Call or Return if symptoms worsen or persist.     instructed pt that she can now just come in for the monthly shot as a walk in and then just bring her serum with her--then once the refills done--will be due the labs again             Electronically Signed by: LIVE Lazaro -Author on March 12, 2019 12:06:07 PM

## 2021-05-07 NOTE — PROGRESS NOTES
Progress Note      Patient Name: Faith Alegria   Patient ID: [id number]   Sex: Female   YOB: 1955        Visit Date: 2018    Provider: LIVE Lazaro   Location: [facility]   Location Address: [address] [place] [address]  [id number]   Location Phone: [phone]          Chief Complaint     PATIENT IS HERE FOR HER B12 SHOT, SHE HAS HER SERUM, HER PLANTAR WARTS AND WANTS TO ASK YOU ABOUT GETTING TESTED FOR PINEOBLOMAST CANCER.       History Of Present Illness  Faith Alegria is a 63 year old /White female who presents for evaluation and treatment of:      pt here for the treatment of the plantar wart    then also the B12 shot monthly--    then the pt also wants to be tested for the pineoblastoma cancer-that her grandson  from at age 17   and states her daughter (mother of this grandson) being referred to a gene specialist for the further eval of this to see if the specific gene was something inherited--and has a granddaughter also who ids currently pregnancy and carries the gen for CF ass well--and her grandsons MD's are wanting the family to be tested     last Cscope 3 yrs ago--       Past Medical History  Disease Name Date Onset Notes   Arthritis --  --    Cataract --  --    GERD (gastroesophageal reflux disease) --  --    Hyperlipidemia --  --          Past Surgical History  Procedure Name Date Notes   Cataract surgery --  --    Foot surgery --  --    Hysterectomy --  --    Hysterectomy (due to cancer) --  --    Low Back Disc --  --    Shoulder surgery --  --          Medication List  Name Date Started Instructions   cyanocobalamin (vitamin B-12) 1,000 mcg/mL injection solution 2018 inject 1 milliliter (1,000 mcg) by intramuscular route once a month for 30 days         Allergy List  Allergen Name Date Reaction Notes   NO KNOWN DRUG ALLERGIES --  --  --          Family Medical History  Disease Name Relative/Age  Notes   Alcohol Abuse / Father; Grandfather (maternal); Grandfather (paternal); Brother    Brother/     Father/     Grandfather (maternal)/     Grandfather (paternal)/    Depression / Mother; Brother; Sister    Brother/     Mother/     Sister/    Hyperlipidemia / Mother; Sister    Mother/     Sister/    Hypertension / Mother; Brother; Sister    Brother/     Mother/     Sister/    Osteoporosis / Sister    Sister/          Social History  Finding Status Start/Stop Quantity Notes   Alcohol Never --/-- --  never drinks alcohol   Exercises regularly --  --/-- --  occasionally   Recreational Drug Use Never --/-- --  never used   Second hand smoke exposure Unknown --/-- --  yes   Tobacco Current - status unknown --/-- 0.5 PPD smokes less than 1 pack per day, for 20 years, never uses other tobacco products  SMOKER. ABOUT PACK A DAY.   Uses seatbelts --  --/-- --  yes         Review of Systems  · Constitutional  o Denies  o : fatigue, night sweats  · HENT  o Denies  o : headaches, vertigo, recent head injury  · Cardiovascular  o Denies  o : chest pain, irregular heart beats  · Respiratory  o Denies  o : shortness of breath, productive cough  · Gastrointestinal  o Denies  o : nausea, vomiting  · Genitourinary  o Denies  o : dysuria, urinary retention  · Integument  o Admits  o : additional integumentary symptoms except as noted in the HPI  o Denies  o : hair growth change, new skin lesions  · Neurologic  o Denies  o : altered mental status, memory difficulties, speech difficulties, seizures, tremors, loss of balance  · Musculoskeletal  o Admits  o : joint pain, back pain  o Denies  o : joint swelling, limitation of motion  · Endocrine  o Denies  o : cold intolerance, heat intolerance  · Psychiatric  o Denies  o : suicidal ideation, homicidal ideation  · Heme-Lymph  o Denies  o : petechiae, lymph node enlargement or tenderness  · Allergic-Immunologic  o Denies  o : frequent illnesses      Vitals  Date Time BP Position Site  "L\R Cuff Size HR RR TEMP(F) WT  HT  BMI kg/m2 BSA m2 O2 Sat        09/24/2018 08:19 /40 Sitting    110 - R  97 134lbs 2oz 4'  8\" 30.07 1.55 97 %           Physical Examination  · Constitutional  o Appearance  o : well developed, well-nourished, in no acute distress  · Head and Face  o HEENT  o : Unremarkable  · Eyes  o Conjunctivae  o : conjunctivae normal  · Neck  o Inspection/Palpation  o : supple  o Thyroid  o : no thyromegaly  · Respiratory  o Respiratory Effort  o : breathing unlabored  o Auscultation of Lungs  o : clear to ascultation  · Cardiovascular  o Heart  o :   § Auscultation of Heart  § : no edema  o Peripheral Vascular System  o :   § Extremities  § : no edema  · Lymphatic  o Neck  o : no lymphadenopathy present  · Musculoskeletal  o General  o :   § General Musculoskeletal  § : No joint swelling or deformity., Muscle tone, strength, and development grossly normal.  · Skin and Subcutaneous Tissue  o General Inspection  o : , skin turgor normal, texture normal--bottom of the right foot wart vastly improved and not as prominent nor tender--and applied the liquid nitrogen approx. total 30 sec and pt tolerated well   · Neurologic  o Gait and Station  o :   § Gait Screening  § : normal gait  · Psychiatric  o Mood and Affect  o : mood normal, affect appropriate          Assessment  · Vitamin B12 deficiency     266.2/E53.8  · Family history of brain tumor     V19.8/Z84.89  · Plantar wart     078.12/B07.0  · Colon polyps     211.3/K63.5      Plan  · Orders  o IM - Injection Fee [facility] ([id number]) - 266.2/E53.8 - 09/24/2018  o ACO-39: Current medications updated and reviewed () - - 09/24/2018  o Genetic Consultation (DIANA) - V19.8/Z84.89 - 09/24/2018   her grandson passed recently with the pineoblastoma brain cancer and her granddaughter also Dx'd with carrying the gene for Cystic fibrosis--and they are wanting the family gene tested--  o GASTROENTEROLOGY (GASTR) - 211.3/K63.5 - 09/24/2018   normally " brock 3-5 yrs--and was seeing DR HUSSAIN Lopez  · Instructions  o Patient was educated/instructed on their diagnosis, treatment and medications prior to discharge from the clinic today.  o Patient instructed to seek medical attention urgently for new or worsening symptoms.  o Chronic conditions reviewed and taken into consideration for today's treatment plan.  · Disposition  o Call or Return if symptoms worsen or persist.  o Return Visit Request in/on 1 month +/- 2 days ([id number]).                  Electronically Signed by: LIVE Lazaro -Author on September 24, 2018 09:43:28 AM

## 2021-05-07 NOTE — PROGRESS NOTES
Progress Note      Patient Name: Faith Alegria   Patient ID: [id number]   Sex: Female   YOB: 1955    Primary Care Provider: Rosalba MANCINI   Referring Provider: Rosalba MANCINI    Visit Date: February 14, 2019    Provider: VEL Grant   Location: [facility]   Location Address: [address] [place] [address]  [id number]   Location Phone: [phone]          Chief Complaint     B12 injection       History Of Present Illness  Faith Alegria is a 63 year old /White female who presents for evaluation and treatment of:      Vitamin B12 injection for hx of B12 deficiency - helps with fatigue and mood, states about the time it is due she is [place] and more fatigued       Past Medical History  Disease Name Date Onset Notes   Arthritis --  --    Cataract --  --    GERD (gastroesophageal reflux disease) --  --    Hyperlipidemia --  --          Past Surgical History  Procedure Name Date Notes   Cataract surgery --  --    Foot surgery --  --    Hysterectomy --  --    Hysterectomy (due to cancer) --  --    Low Back Disc --  --    Shoulder surgery --  --          Medication List  Name Date Started Instructions   cyanocobalamin (vitamin B-12) 1,000 mcg/mL injection solution 01/16/2019 inject 1 milliliter (1,000 mcg) by intramuscular route once a month         Allergy List  Allergen Name Date Reaction Notes   NO KNOWN DRUG ALLERGIES --  --  --          Family Medical History  Disease Name Relative/Age Notes   Alcohol Abuse / Father; Grandfather (maternal); Grandfather (paternal); Brother    Brother/     Father/     Grandfather (maternal)/     Grandfather (paternal)/    Depression / Mother; Brother; Sister    Brother/     Mother/     Sister/    Hyperlipidemia / Mother; Sister    Mother/     Sister/    Hypertension / Mother; Brother; Sister    Brother/     Mother/     Sister/    Osteoporosis / Sister    Sister/          Social History  Finding  Status Start/Stop Quantity Notes   Alcohol Never --/-- --  never drinks alcohol   Exercises regularly --  --/-- --  occasionally   Recreational Drug Use Never --/-- --  never used   Second hand smoke exposure Unknown --/-- --  yes   Tobacco Current every day 18/-- 0.5 PPD smokes less than 1 pack per day, for 20 years, never uses other tobacco products  SMOKER. ABOUT PACK A DAY.   Uses seatbelts --  --/-- --  yes         Immunizations  NameDate Admin Mfg Trade Name Lot Number Route Inj VIS Given VIS Publication   [id number]/23/2018 [facility] Fluzone Quadrivalent [id number] IM  10/23/2018 08/07/2015   Comments: NDC [id number]         Review of Systems  · Constitutional  o Denies  o : fever, chills, body aches  · Cardiovascular  o Denies  o : chest pain, palpitations  · Respiratory  o Denies  o : shortness of breath, wheezing, cough      Vitals  Date Time BP Position Site L\R Cuff Size HR RR TEMP(F) WT  HT  BMI kg/m2 BSA m2 O2 Sat        02/14/2019 01:19 /80 Sitting    99 - R  97 131lbs 0oz    96 %           Physical Examination  · Constitutional  o Appearance  o : well developed, well-nourished, in no acute distress  · Eyes  o Conjunctivae  o : conjunctivae normal  o Pupils and Irises  o : pupils equal and round, pupils reactive to light bilaterally  · Neck  o Inspection/Palpation  o : supple  o Thyroid  o : no thyromegaly  · Respiratory  o Respiratory Effort  o : breathing unlabored  o Auscultation of Lungs  o : clear to ascultation  · Cardiovascular  o Heart  o :   § Auscultation of Heart  § : regular rate and rhythm  o Peripheral Vascular System  o :   § Extremities  § : no edema  · Lymphatic  o Neck  o : no lymphadenopathy present  · Musculoskeletal  o General  o :   § General Musculoskeletal  § : No joint swelling or deformity. Muscle tone, strength, and development grossly normal.  · Skin and Subcutaneous Tissue  o General Inspection  o : NL tone  · Neurologic  o Gait and Station  o :   § Gait Screening  § :  normal gait  · Psychiatric  o Mood and Affect  o : mood normal, affect appropriate              Assessment  · Vitamin B12 deficiency     266.2/E53.8      Plan  · Orders  o IM - Injection Fee [facility] ([id number]) - 266.2/E53.8 - 02/14/2019  o Vitamin B12 Injection () - 266.2/E53.8 - 02/14/2019   Injection - Vitamin B12; Dose: 1 mL; Site: Right Deltoid; Route: intramuscular; Date: 02/14/2019 13:26:36; Exp: 04/01/2019; Lot: [id number]; Mfg: [employer]; TradeName: cyanocobalamin (vitamin B-12); Location: [facility]; Administered By: Kortney Graham MA; Comment: ndc [id number]  o ACO-39: Current medications updated and reviewed () - - 02/14/2019  · Instructions  o Patient was educated/instructed on their diagnosis, treatment and medications prior to discharge from the clinic today.  o Follow up in one month.  · Disposition  o Return Visit Request in/on 1 month +/- 2 days (3830).            Electronically Signed by: VEL Grant -Author on February 14, 2019 01:48:41 PM

## 2021-05-07 NOTE — PROGRESS NOTES
Progress Note      Patient Name: Faith Alegria   Patient ID: [id number]   Sex: Female   YOB: 1955        Visit Date: July 30, 2018    Provider: LIVE Lazaro   Location: [facility]   Location Address: [address] [place] [address]  [id number]   Location Phone: [phone]          Chief Complaint     Needs B12 shot and spray wart on right foot       History Of Present Illness  Faith Alegria is a 63 year old /White female who presents for evaluation and treatment of:      1) cold and cough and cannot get rid of it-approx. 2-3 weeks now--and then started sinus s/s--    2) hx of diverticulitis and LLQ pain and feels like flaring up and diarrhea--and thick and dark stool and then calmed down now--and started taking mucinex for the cold and didn't know if could flare it up--ate some tomatoes and peppers --s/s x 1 week--and are easing up somewhat now--and rates the pain at 2/10 and then also at times restarts if needs to have a BM__    3) here for the B12 shot monthly today    then the foot sprayed for a wart     and saw neurologist and they did Dx her with essential tremor but was fine-and mild and they are not doing meds right now will just F/U when they start getting worse-       Past Medical History  Disease Name Date Onset Notes   Arthritis --  --    Cataract --  --    GERD (gastroesophageal reflux disease) --  --    Hyperlipidemia --  --          Past Surgical History  Procedure Name Date Notes   Cataract surgery --  --    Foot surgery --  --    Hysterectomy --  --    Hysterectomy (due to cancer) --  --    Low Back Disc --  --    Shoulder surgery --  --          Medication List  Name Date Started Instructions   cyanocobalamin (vitamin B-12) 1,000 mcg/mL injection solution 07/02/2018 inject 1 milliliter (1,000 mcg) by intramuscular route once a month for 30 days         Allergy List  Allergen Name Date Reaction Notes   NO KNOWN DRUG  ALLERGIES --  --  --          Family Medical History  Disease Name Relative/Age Notes   Alcohol Abuse / Father; Grandfather (maternal); Grandfather (paternal); Brother    Brother/     Father/     Grandfather (maternal)/     Grandfather (paternal)/    Depression / Mother; Brother; Sister    Brother/     Mother/     Sister/    Hyperlipidemia / Mother; Sister    Mother/     Sister/    Hypertension / Mother; Brother; Sister    Brother/     Mother/     Sister/    Osteoporosis / Sister    Sister/          Social History  Finding Status Start/Stop Quantity Notes   Alcohol Never --/-- --  never drinks alcohol   Exercises regularly --  --/-- --  occasionally   Recreational Drug Use Never --/-- --  never used   Second hand smoke exposure Unknown --/-- --  yes   Tobacco Current - status unknown --/-- 0.5 PPD smokes less than 1 pack per day, for 20 years, never uses other tobacco products  SMOKER. ABOUT PACK A DAY.   Uses seatbelts --  --/-- --  yes         Review of Systems  · Constitutional  o Admits  o : fatigue  o Denies  o : fever, chills, night sweats, body aches  · HENT  o Admits  o : sinus pain, nasal congestion, nasal discharge, postnasal drip  · Cardiovascular  o Denies  o : chest pain, palpitations (fast, fluttering, or skipping beats), swelling (feet, ankles, hands), shortness of breath while walking or lying flat  · Respiratory  o Admits  o : wheezing, cough, productive cough  o Denies  o : shortness of breath, asthma or wheezing, COPD  · Gastrointestinal  o Admits  o : abdominal pain, additional gastrointestinal symptoms except as noted in the HPI  o Denies  o : ulcers, nausea or vomiting  · Genitourinary  o Denies  o : dysuria, urinary retention  · Integument  o Denies  o : rash, new skin lesions  · Neurologic  o Admits  o : tremors  o Denies  o : lightheaded or dizzy, stroke, headaches  · Musculoskeletal  o Denies  o : joint pain, back pain  · Endocrine  o Denies  o : thyroid disease, diabetes, heat or cold  "intolerance, excessive thirst or urination  · Psychiatric  o Denies  o : anxiety, depression, suicidal ideation, homicidal ideation  · Heme-Lymph  o Denies  o : bleeding or bruising tendency, anemia      Vitals  Date Time BP Position Site L\R Cuff Size HR RR TEMP(F) WT  HT  BMI kg/m2 BSA m2 O2 Sat HC       07/30/2018 09:13 /80 Sitting    95 - R  96.8 134lbs 0oz 4'  8\" 30.04 1.55 97 %           Physical Examination  · Constitutional  o Appearance  o : well developed, well-nourished, in no acute distress  · Head and Face  o HEENT  o : Unremarkable  · Eyes  o Conjunctivae  o : conjunctivae normal  · Neck  o Inspection/Palpation  o : supple  o Thyroid  o : no thyromegaly  · Respiratory  o Respiratory Effort  o : breathing unlabored  o Auscultation of Lungs  o : faints loose scattered wheezes noted --very congested cough  · Cardiovascular  o Heart  o :   § Auscultation of Heart  § : regular rate and rhythm  o Peripheral Vascular System  o :   § Extremities  § : no edema  · Gastrointestinal  o Abdominal Examination  o :   § Abdomen  § : soft and nontender--only mild LLQ tenderness and no guarding no rebound  · Lymphatic  o Neck  o : no lymphadenopathy present  · Musculoskeletal  o General  o :   § General Musculoskeletal  § : No joint swelling or deformity., Muscle tone, strength, and development grossly normal.  · Skin and Subcutaneous Tissue  o General Inspection  o : skin turgor normal, texture normal--right foot with a plantar wart and applied 30-40 seconds with liquid nitrogen and pt tolerated well   · Neurologic  o Gait and Station  o :   § Gait Screening  § : normal gait  · Psychiatric  o Mood and Affect  o : mood normal, affect appropriate          Assessment  · Vitamin B12 deficiency     266.2/E53.8  · Lower respiratory infection     519.8/J22  · History of diverticulitis of colon     V12.79/Z87.19  · Left lower quadrant abdominal tenderness     789.64/R10.814  · Plantar wart of right " foot     078.12/B07.0      Plan  · Orders  o Immunization Admin Fee (Single) ([facility]) (83353) - 266.2/E53.8 - 07/30/2018  o ACO-17: Screened for tobacco use AND received tobacco cessation intervention (4004F) - - 07/30/2018  o ACO-39: Current medications updated and reviewed () - - 07/30/2018  o Vitamin B12 Injection () - 266.2/E53.8 - 07/30/2018   Injection - Vitamin B12; Dose: 1 mL; Site: Left Deltoid; Route: intramuscular; Date: 07/30/2018 09:52:25; Exp: 10/31/2020; Lot: [id number]; Mfg: [employer]; TradeName: cyanocobalamin (vitamin B-12); Location: [facility]; Administered By: Tiffany Villa MA; Comment: [id number]  · Medications  o Levaquin 500 mg oral tablet   SIG: take 1 tablet (500 mg) by oral route once daily for 10 days   DISP: (10) tablets with 0 refills  Prescribed on 07/30/2018     o Flagyl 500 mg oral tablet   SIG: take 1 tablet (500 mg) by oral route 3 times per day for 10 days   DISP: (30) tablets with 0 refills  Prescribed on 07/30/2018     o Tessalon Perles 100 mg oral capsule   SIG: take 1 capsule (100 mg) by oral route 3 times per day as needed for cough for 10 days   DISP: (30) capsules with 0 refills  Prescribed on 07/30/2018     · Instructions  o Take all medications as prescribed/directed.  o Rest. Increase Fluids.  o Patient was educated/instructed on their diagnosis, treatment and medications prior to discharge from the clinic today.  o Patient counseled to stop smoking.  o Call the office with any concerns or questions.  o Risks, benefits, and alternatives were discussed with the patient. The patient is aware of risks associated with: flare ups of the diverticulitis--and to F/U ASAP should anything worsen or to the ER dept   o Chronic conditions reviewed and taken into consideration for today's treatment plan.  · Disposition  o Call or Return if symptoms worsen or persist.  o Return Visit Request in/on 2 weeks +/- 2 days  (7393).            Electronically Signed by: LIVE Lazaro -Author on July 30, 2018 10:08:50 AM

## 2021-05-09 VITALS
WEIGHT: 131.56 LBS | OXYGEN SATURATION: 96 % | HEIGHT: 56 IN | HEART RATE: 92 BPM | SYSTOLIC BLOOD PRESSURE: 124 MMHG | DIASTOLIC BLOOD PRESSURE: 68 MMHG | BODY MASS INDEX: 29.59 KG/M2 | TEMPERATURE: 97.1 F

## 2021-05-09 VITALS
HEART RATE: 99 BPM | SYSTOLIC BLOOD PRESSURE: 140 MMHG | TEMPERATURE: 97 F | WEIGHT: 131 LBS | DIASTOLIC BLOOD PRESSURE: 80 MMHG | OXYGEN SATURATION: 96 %

## 2021-05-09 VITALS
TEMPERATURE: 97 F | SYSTOLIC BLOOD PRESSURE: 122 MMHG | HEART RATE: 108 BPM | DIASTOLIC BLOOD PRESSURE: 70 MMHG | OXYGEN SATURATION: 97 % | HEIGHT: 56 IN | WEIGHT: 127.06 LBS | BODY MASS INDEX: 28.58 KG/M2

## 2021-05-09 VITALS
TEMPERATURE: 97.8 F | HEART RATE: 101 BPM | DIASTOLIC BLOOD PRESSURE: 80 MMHG | SYSTOLIC BLOOD PRESSURE: 130 MMHG | OXYGEN SATURATION: 97 % | WEIGHT: 130.06 LBS

## 2021-05-09 VITALS
DIASTOLIC BLOOD PRESSURE: 68 MMHG | OXYGEN SATURATION: 97 % | TEMPERATURE: 97 F | HEART RATE: 103 BPM | SYSTOLIC BLOOD PRESSURE: 110 MMHG | WEIGHT: 136.5 LBS

## 2021-05-09 VITALS
TEMPERATURE: 96.4 F | SYSTOLIC BLOOD PRESSURE: 126 MMHG | DIASTOLIC BLOOD PRESSURE: 74 MMHG | HEART RATE: 103 BPM | WEIGHT: 132.19 LBS | HEIGHT: 56 IN | OXYGEN SATURATION: 97 % | BODY MASS INDEX: 29.74 KG/M2

## 2021-05-09 VITALS
TEMPERATURE: 97 F | OXYGEN SATURATION: 97 % | SYSTOLIC BLOOD PRESSURE: 118 MMHG | HEART RATE: 100 BPM | DIASTOLIC BLOOD PRESSURE: 58 MMHG | BODY MASS INDEX: 30.59 KG/M2 | HEIGHT: 56 IN | WEIGHT: 136 LBS

## 2021-05-09 VITALS
DIASTOLIC BLOOD PRESSURE: 74 MMHG | TEMPERATURE: 96.7 F | WEIGHT: 136 LBS | HEART RATE: 100 BPM | HEIGHT: 56 IN | BODY MASS INDEX: 30.59 KG/M2 | SYSTOLIC BLOOD PRESSURE: 118 MMHG | OXYGEN SATURATION: 98 %

## 2021-05-09 VITALS
TEMPERATURE: 96.8 F | WEIGHT: 136 LBS | HEART RATE: 102 BPM | DIASTOLIC BLOOD PRESSURE: 66 MMHG | BODY MASS INDEX: 30.59 KG/M2 | OXYGEN SATURATION: 96 % | HEIGHT: 56 IN | SYSTOLIC BLOOD PRESSURE: 118 MMHG

## 2021-05-09 VITALS
OXYGEN SATURATION: 97 % | HEIGHT: 56 IN | BODY MASS INDEX: 30.17 KG/M2 | HEART RATE: 110 BPM | WEIGHT: 134.12 LBS | TEMPERATURE: 97 F | DIASTOLIC BLOOD PRESSURE: 40 MMHG | SYSTOLIC BLOOD PRESSURE: 128 MMHG

## 2021-05-09 VITALS
OXYGEN SATURATION: 96 % | TEMPERATURE: 97 F | WEIGHT: 132.06 LBS | DIASTOLIC BLOOD PRESSURE: 78 MMHG | HEART RATE: 82 BPM | SYSTOLIC BLOOD PRESSURE: 130 MMHG

## 2021-05-09 VITALS
HEART RATE: 103 BPM | BODY MASS INDEX: 28.6 KG/M2 | DIASTOLIC BLOOD PRESSURE: 62 MMHG | SYSTOLIC BLOOD PRESSURE: 124 MMHG | TEMPERATURE: 97 F | HEIGHT: 56 IN | WEIGHT: 127.12 LBS | OXYGEN SATURATION: 96 %

## 2021-05-09 VITALS
SYSTOLIC BLOOD PRESSURE: 124 MMHG | DIASTOLIC BLOOD PRESSURE: 60 MMHG | BODY MASS INDEX: 29.32 KG/M2 | TEMPERATURE: 98 F | HEIGHT: 56 IN | WEIGHT: 130.37 LBS | HEART RATE: 96 BPM | OXYGEN SATURATION: 94 %

## 2021-05-09 VITALS
OXYGEN SATURATION: 97 % | WEIGHT: 131.56 LBS | HEART RATE: 103 BPM | SYSTOLIC BLOOD PRESSURE: 128 MMHG | BODY MASS INDEX: 28.38 KG/M2 | DIASTOLIC BLOOD PRESSURE: 68 MMHG | HEIGHT: 57 IN | TEMPERATURE: 98 F

## 2021-05-09 VITALS
OXYGEN SATURATION: 96 % | WEIGHT: 134 LBS | SYSTOLIC BLOOD PRESSURE: 120 MMHG | HEART RATE: 95 BPM | DIASTOLIC BLOOD PRESSURE: 80 MMHG | TEMPERATURE: 96.6 F

## 2021-05-09 VITALS
DIASTOLIC BLOOD PRESSURE: 80 MMHG | HEART RATE: 95 BPM | OXYGEN SATURATION: 97 % | TEMPERATURE: 96.8 F | SYSTOLIC BLOOD PRESSURE: 136 MMHG | HEIGHT: 56 IN | BODY MASS INDEX: 30.14 KG/M2 | WEIGHT: 134 LBS

## 2021-05-15 VITALS
SYSTOLIC BLOOD PRESSURE: 163 MMHG | DIASTOLIC BLOOD PRESSURE: 76 MMHG | HEIGHT: 57 IN | OXYGEN SATURATION: 97 % | HEART RATE: 74 BPM | BODY MASS INDEX: 28.34 KG/M2 | WEIGHT: 131.37 LBS

## 2021-05-16 VITALS
RESPIRATION RATE: 16 BRPM | SYSTOLIC BLOOD PRESSURE: 155 MMHG | HEART RATE: 87 BPM | WEIGHT: 133 LBS | OXYGEN SATURATION: 99 % | BODY MASS INDEX: 28.69 KG/M2 | DIASTOLIC BLOOD PRESSURE: 92 MMHG | HEIGHT: 57 IN

## 2021-05-26 ENCOUNTER — OFFICE VISIT CONVERTED (OUTPATIENT)
Dept: FAMILY MEDICINE CLINIC | Facility: CLINIC | Age: 66
End: 2021-05-26
Attending: NURSE PRACTITIONER

## 2021-05-26 ENCOUNTER — CONVERSION ENCOUNTER (OUTPATIENT)
Dept: FAMILY MEDICINE CLINIC | Facility: CLINIC | Age: 66
End: 2021-05-26

## 2021-05-26 ENCOUNTER — HOSPITAL ENCOUNTER (OUTPATIENT)
Dept: FAMILY MEDICINE CLINIC | Facility: CLINIC | Age: 66
Discharge: HOME | End: 2021-05-26
Attending: NURSE PRACTITIONER

## 2021-05-26 LAB
25(OH)D3 SERPL-MCNC: 40.2 NG/ML (ref 30–100)
BASOPHILS # BLD AUTO: 0.05 10*3/UL (ref 0–0.2)
BASOPHILS NFR BLD AUTO: 0.6 % (ref 0–3)
CONV ABS IMM GRAN: 0.01 10*3/UL (ref 0–0.2)
CONV IMMATURE GRAN: 0.1 % (ref 0–1.8)
DEPRECATED RDW RBC AUTO: 47.8 FL (ref 36.4–46.3)
EOSINOPHIL # BLD AUTO: 0.08 10*3/UL (ref 0–0.7)
EOSINOPHIL # BLD AUTO: 1 % (ref 0–7)
ERYTHROCYTE [DISTWIDTH] IN BLOOD BY AUTOMATED COUNT: 13.3 % (ref 11.7–14.4)
FERRITIN SERPL-MCNC: 46 NG/ML (ref 10–200)
FOLATE SERPL-MCNC: 13.8 NG/ML (ref 4.8–20)
HCT VFR BLD AUTO: 42.7 % (ref 37–47)
HGB BLD-MCNC: 13.9 G/DL (ref 12–16)
IRON SATN MFR SERPL: 18 % (ref 20–55)
IRON SERPL-MCNC: 53 UG/DL (ref 60–170)
LYMPHOCYTES # BLD AUTO: 2.38 10*3/UL (ref 1–5)
LYMPHOCYTES NFR BLD AUTO: 30.2 % (ref 20–45)
MCH RBC QN AUTO: 31.4 PG (ref 27–31)
MCHC RBC AUTO-ENTMCNC: 32.6 G/DL (ref 33–37)
MCV RBC AUTO: 96.6 FL (ref 81–99)
MONOCYTES # BLD AUTO: 0.49 10*3/UL (ref 0.2–1.2)
MONOCYTES NFR BLD AUTO: 6.2 % (ref 3–10)
NEUTROPHILS # BLD AUTO: 4.86 10*3/UL (ref 2–8)
NEUTROPHILS NFR BLD AUTO: 61.9 % (ref 30–85)
NRBC CBCN: 0 % (ref 0–0.7)
PLATELET # BLD AUTO: 286 10*3/UL (ref 130–400)
PMV BLD AUTO: 9.9 FL (ref 9.4–12.3)
RBC # BLD AUTO: 4.42 10*6/UL (ref 4.2–5.4)
T4 FREE SERPL-MCNC: 1.1 NG/DL (ref 0.9–1.8)
TIBC SERPL-MCNC: 295 UG/DL (ref 245–450)
TRANSFERRIN SERPL-MCNC: 206 MG/DL (ref 250–380)
TSH SERPL-ACNC: 1.37 M[IU]/L (ref 0.27–4.2)
VIT B12 SERPL-MCNC: 652 PG/ML (ref 211–911)
WBC # BLD AUTO: 7.87 10*3/UL (ref 4.8–10.8)

## 2021-05-28 LAB
ASO AB SERPL-ACNC: 41 [IU]/ML (ref 0–200)
CONV RHEUMATOID FACTOR IGM: <10 [IU]/ML (ref 0–14)
CRP SERPL-MCNC: <3 MG/L (ref 0–5)
DSDNA AB SER-ACNC: NEGATIVE [IU]/ML
ENA AB SER IA-ACNC: NEGATIVE {RATIO}
URATE SERPL-MCNC: 3.4 MG/DL (ref 2.5–7.5)

## 2021-05-29 LAB
CMV IGG CSF IA-ACNC: >10 U/ML
CMV IGM SERPL IA-ACNC: 9.3 AU/ML
CONV EBV EARLY ANTIGEN: 36.2 U/ML (ref 0–10.9)
CONV EBV NUCLEAR ANTIGEN: >600 U/ML (ref 0–21.9)
CONV EPSTEIN BARR VIRAL CAPSID IGM: 15.6 U/ML (ref 0–43.9)
CONV EPSTEIN BARR VIRUS ANTIBODY TO VIRAL CAPSID IGG: 368 U/ML (ref 0–21.9)

## 2021-06-02 ENCOUNTER — HOSPITAL ENCOUNTER (OUTPATIENT)
Dept: OTHER | Facility: HOSPITAL | Age: 66
Discharge: HOME | End: 2021-06-02
Attending: NURSE PRACTITIONER
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
## 2021-07-15 VITALS
HEART RATE: 86 BPM | DIASTOLIC BLOOD PRESSURE: 62 MMHG | OXYGEN SATURATION: 94 % | TEMPERATURE: 97.1 F | WEIGHT: 129.12 LBS | BODY MASS INDEX: 27.86 KG/M2 | SYSTOLIC BLOOD PRESSURE: 124 MMHG | HEIGHT: 57 IN

## 2021-08-16 RX ORDER — TIZANIDINE HYDROCHLORIDE 2 MG/1
CAPSULE, GELATIN COATED ORAL
COMMUNITY
End: 2021-08-17

## 2021-08-16 RX ORDER — TRAMADOL HYDROCHLORIDE 50 MG/1
TABLET ORAL
COMMUNITY
End: 2021-08-17

## 2021-08-16 RX ORDER — PRAVASTATIN SODIUM 20 MG
TABLET ORAL
COMMUNITY
End: 2021-08-17

## 2021-08-17 ENCOUNTER — OFFICE VISIT (OUTPATIENT)
Dept: FAMILY MEDICINE CLINIC | Facility: CLINIC | Age: 66
End: 2021-08-17

## 2021-08-17 VITALS
TEMPERATURE: 97.3 F | OXYGEN SATURATION: 97 % | BODY MASS INDEX: 27.27 KG/M2 | SYSTOLIC BLOOD PRESSURE: 142 MMHG | WEIGHT: 126 LBS | DIASTOLIC BLOOD PRESSURE: 82 MMHG | HEART RATE: 68 BPM

## 2021-08-17 DIAGNOSIS — Z00.00 MEDICARE ANNUAL WELLNESS VISIT, SUBSEQUENT: Primary | ICD-10-CM

## 2021-08-17 DIAGNOSIS — Z12.31 ENCOUNTER FOR SCREENING MAMMOGRAM FOR MALIGNANT NEOPLASM OF BREAST: ICD-10-CM

## 2021-08-17 DIAGNOSIS — R11.0 NAUSEA: ICD-10-CM

## 2021-08-17 DIAGNOSIS — R10.13 EPIGASTRIC BURNING SENSATION: ICD-10-CM

## 2021-08-17 DIAGNOSIS — R25.1 TREMOR OF BOTH HANDS: ICD-10-CM

## 2021-08-17 DIAGNOSIS — Z72.0 TOBACCO USE: ICD-10-CM

## 2021-08-17 DIAGNOSIS — R63.0 LOSS OF APPETITE: ICD-10-CM

## 2021-08-17 DIAGNOSIS — K21.9 GASTROESOPHAGEAL REFLUX DISEASE WITHOUT ESOPHAGITIS: ICD-10-CM

## 2021-08-17 PROBLEM — M54.30 SCIATICA: Status: ACTIVE | Noted: 2017-02-02

## 2021-08-17 PROBLEM — E78.5 HYPERLIPIDEMIA: Status: ACTIVE | Noted: 2021-08-17

## 2021-08-17 PROBLEM — M19.90 ARTHRITIS: Status: ACTIVE | Noted: 2021-08-17

## 2021-08-17 PROBLEM — H26.9 CATARACT: Status: ACTIVE | Noted: 2021-08-17

## 2021-08-17 PROBLEM — IMO0002 THORACIC OR LUMBOSACRAL NEURITIS OR RADICULITIS: Status: ACTIVE | Noted: 2017-02-02

## 2021-08-17 PROBLEM — M48.061 LUMBAR SPINAL STENOSIS: Status: ACTIVE | Noted: 2017-02-02

## 2021-08-17 LAB
BASOPHILS # BLD AUTO: 0.04 10*3/MM3 (ref 0–0.2)
BASOPHILS NFR BLD AUTO: 0.4 % (ref 0–1.5)
DEPRECATED RDW RBC AUTO: 44.4 FL (ref 37–54)
EOSINOPHIL # BLD AUTO: 0.17 10*3/MM3 (ref 0–0.4)
EOSINOPHIL NFR BLD AUTO: 1.8 % (ref 0.3–6.2)
ERYTHROCYTE [DISTWIDTH] IN BLOOD BY AUTOMATED COUNT: 12.3 % (ref 12.3–15.4)
HCT VFR BLD AUTO: 46.6 % (ref 34–46.6)
HGB BLD-MCNC: 14.8 G/DL (ref 12–15.9)
IMM GRANULOCYTES # BLD AUTO: 0.04 10*3/MM3 (ref 0–0.05)
IMM GRANULOCYTES NFR BLD AUTO: 0.4 % (ref 0–0.5)
LYMPHOCYTES # BLD AUTO: 2.55 10*3/MM3 (ref 0.7–3.1)
LYMPHOCYTES NFR BLD AUTO: 26.5 % (ref 19.6–45.3)
MCH RBC QN AUTO: 31.1 PG (ref 26.6–33)
MCHC RBC AUTO-ENTMCNC: 31.8 G/DL (ref 31.5–35.7)
MCV RBC AUTO: 97.9 FL (ref 79–97)
MONOCYTES # BLD AUTO: 0.61 10*3/MM3 (ref 0.1–0.9)
MONOCYTES NFR BLD AUTO: 6.3 % (ref 5–12)
NEUTROPHILS NFR BLD AUTO: 6.23 10*3/MM3 (ref 1.7–7)
NEUTROPHILS NFR BLD AUTO: 64.6 % (ref 42.7–76)
NRBC BLD AUTO-RTO: 0 /100 WBC (ref 0–0.2)
PLATELET # BLD AUTO: 255 10*3/MM3 (ref 140–450)
PMV BLD AUTO: 10.1 FL (ref 6–12)
RBC # BLD AUTO: 4.76 10*6/MM3 (ref 3.77–5.28)
WBC # BLD AUTO: 9.64 10*3/MM3 (ref 3.4–10.8)

## 2021-08-17 PROCEDURE — 80053 COMPREHEN METABOLIC PANEL: CPT | Performed by: NURSE PRACTITIONER

## 2021-08-17 PROCEDURE — 80061 LIPID PANEL: CPT | Performed by: NURSE PRACTITIONER

## 2021-08-17 PROCEDURE — 1170F FXNL STATUS ASSESSED: CPT | Performed by: NURSE PRACTITIONER

## 2021-08-17 PROCEDURE — 84443 ASSAY THYROID STIM HORMONE: CPT | Performed by: NURSE PRACTITIONER

## 2021-08-17 PROCEDURE — G0439 PPPS, SUBSEQ VISIT: HCPCS | Performed by: NURSE PRACTITIONER

## 2021-08-17 PROCEDURE — 36415 COLL VENOUS BLD VENIPUNCTURE: CPT | Performed by: NURSE PRACTITIONER

## 2021-08-17 PROCEDURE — 1159F MED LIST DOCD IN RCRD: CPT | Performed by: NURSE PRACTITIONER

## 2021-08-17 PROCEDURE — 85025 COMPLETE CBC W/AUTO DIFF WBC: CPT | Performed by: NURSE PRACTITIONER

## 2021-08-17 PROCEDURE — 1125F AMNT PAIN NOTED PAIN PRSNT: CPT | Performed by: NURSE PRACTITIONER

## 2021-08-17 RX ORDER — ONDANSETRON 4 MG/1
4 TABLET, ORALLY DISINTEGRATING ORAL EVERY 8 HOURS PRN
Qty: 30 TABLET | Refills: 2 | Status: SHIPPED | OUTPATIENT
Start: 2021-08-17 | End: 2022-01-16

## 2021-08-17 RX ORDER — SUCRALFATE 1 G/1
1 TABLET ORAL 4 TIMES DAILY
Qty: 120 TABLET | Refills: 2 | Status: SHIPPED | OUTPATIENT
Start: 2021-08-17 | End: 2022-01-25

## 2021-08-17 NOTE — PROGRESS NOTES
The ABCs of the Annual Wellness Visit  Subsequent Medicare Wellness Visit    Chief Complaint   Patient presents with   • Shaking     Hands are shaking more and would like to be referred to neurologist.   • GI Problem     Stomachs burning and nausea with meals. would like to see Gastro        Subjective   History of Present Illness:  Faith Alegria is a 66 y.o. female who presents for a Subsequent Medicare Wellness Visit.    HEALTH RISK ASSESSMENT    Recent Hospitalizations:  No hospitalization(s) within the last year.    Current Medical Providers:  Patient Care Team:  Rosalba Smith APRN as PCP - General (Nurse Practitioner)    Smoking Status:  Social History     Tobacco Use   Smoking Status Current Every Day Smoker   • Packs/day: 0.50   • Years: 20.00   • Pack years: 10.00   Smokeless Tobacco Never Used       Alcohol Consumption:  Social History     Substance and Sexual Activity   Alcohol Use Never       Depression Screen:   PHQ-2/PHQ-9 Depression Screening 8/17/2021   Little interest or pleasure in doing things 0   Feeling down, depressed, or hopeless 0   Total Score 0       Fall Risk Screen:  STEADI Fall Risk Assessment was completed, and patient is at LOW risk for falls.Assessment completed on:8/17/2021    Health Habits and Functional and Cognitive Screening:  Patient is able to do her own food preparation and shopping and able to use the telephone without any issues  The patient has been in otherwise good general health in the past.  Patient is able to do her own housework  Also able to do her own laundry  she can drive herself without any issues  Her tug test was less than 12 seconds  There have been no falls in the past year and she is not concerned for any falls  Patient reports very independent and highly functional  Patient normally not on regular medications on a regular med basis but is able to manage her own medications  And is able to manage her own finances  Patient denies having any  issues with memory      Does the patient have evidence of cognitive impairment? No    Asprin use counseling:Does not need ASA (and currently is not on it)    Age-appropriate Screening Schedule:  Refer to the list below for future screening recommendations based on patient's age, sex and/or medical conditions. Orders for these recommended tests are listed in the plan section. The patient has been provided with a written plan.    Health Maintenance   Topic Date Due   • ZOSTER VACCINE (1 of 2) Never done   • DXA SCAN  07/16/2021   • INFLUENZA VACCINE  10/01/2021   • LIPID PANEL  08/17/2022   • MAMMOGRAM  08/24/2023   • TDAP/TD VACCINES (2 - Td or Tdap) 07/19/2027          The following portions of the patient's history were reviewed and updated as appropriate: allergies, current medications, past family history, past medical history, past social history, past surgical history and problem list.    Outpatient Medications Prior to Visit   Medication Sig Dispense Refill   • pravastatin (PRAVACHOL) 20 MG tablet pravastatin 20 mg oral tablet take 1 tablet (20 mg) by oral route once daily   Suspended     • TiZANidine (ZANAFLEX) 2 MG capsule tizanidine 2 mg oral capsule take 1 capsule by oral route 2 times a day   Suspended     • traMADol (ULTRAM) 50 MG tablet tramadol 50 mg oral tablet take 1 tablet (50 mg) by oral route every 6 hours as needed   Suspended       No facility-administered medications prior to visit.       Patient Active Problem List   Diagnosis   • Thoracic or lumbosacral neuritis or radiculitis   • Sciatica   • Lumbar spinal stenosis   • Nausea   • Loss of appetite   • Hyperlipidemia   • GERD (gastroesophageal reflux disease)   • Arthritis   • Cataract       Advanced Care Planning:  ACP discussion was held with the patient during this visit. Patient does not have an advance directive, information provided.    Review of Systems   Constitutional: Positive for appetite change and unexpected weight change.   HENT:  Negative.    Eyes: Negative.    Respiratory: Negative.    Cardiovascular: Negative.    Gastrointestinal: Positive for abdominal distention and nausea. Negative for abdominal pain, anal bleeding, blood in stool, constipation, diarrhea, rectal pain and vomiting.        As per HPI-happens after eating    Endocrine: Negative.    Genitourinary: Positive for urgency. Negative for decreased urine volume, difficulty urinating, dyspareunia, dysuria, enuresis, flank pain, frequency, genital sores, hematuria, menstrual problem, pelvic pain, vaginal bleeding, vaginal discharge and vaginal pain.        Hotflashes at times    Musculoskeletal: Positive for arthralgias and back pain. Negative for gait problem, joint swelling, myalgias, neck pain and neck stiffness.        Unchanged   Skin: Negative.    Allergic/Immunologic: Negative.    Neurological: Positive for tremors. Negative for dizziness, seizures, syncope, facial asymmetry, speech difficulty, weakness, light-headedness, numbness and headaches.   Hematological: Negative.    Psychiatric/Behavioral: Negative.        Compared to one year ago, the patient feels her physical health is worse.  Compared to one year ago, the patient feels her mental health is the same.    Reviewed chart for potential of high risk medication in the elderly: yes  Reviewed chart for potential of harmful drug interactions in the elderly:yes    Objective         Vitals:    08/17/21 0806   BP: 142/82   Pulse: 68   Temp: 97.3 °F (36.3 °C)   SpO2: 97%   Weight: 57.2 kg (126 lb)       Body mass index is 27.27 kg/m².  Discussed the patient's BMI with her. The BMI is in the acceptable range.    Physical Exam  Vitals and nursing note reviewed.   Constitutional:       Appearance: Normal appearance.   HENT:      Head: Normocephalic.      Right Ear: External ear normal.      Left Ear: External ear normal.      Nose: Nose normal.      Mouth/Throat:      Comments: wearingmask   Eyes:      Pupils: Pupils are equal,  round, and reactive to light.   Cardiovascular:      Rate and Rhythm: Normal rate and regular rhythm.      Heart sounds: Normal heart sounds.   Pulmonary:      Effort: Pulmonary effort is normal.      Breath sounds: Normal breath sounds.   Chest:      Breasts:         Right: Normal.         Left: Normal.   Abdominal:      General: Abdomen is flat. Bowel sounds are normal. Distension: mid upper epigastric area       Palpations: Abdomen is soft.      Tenderness: There is no abdominal tenderness.   Musculoskeletal:         General: Normal range of motion.      Cervical back: Normal range of motion and neck supple.   Lymphadenopathy:      Upper Body:      Right upper body: No supraclavicular, axillary or pectoral adenopathy.      Left upper body: No supraclavicular, axillary or pectoral adenopathy.   Skin:     General: Skin is warm and dry.   Neurological:      Mental Status: She is alert and oriented to person, place, and time.   Psychiatric:         Mood and Affect: Mood normal.         Behavior: Behavior normal.         Judgment: Judgment normal.         Lab Results   Component Value Date    TRIG 155 (H) 08/17/2021    HDL 47 08/17/2021     (H) 08/17/2021    VLDL 28 08/17/2021        Assessment/Plan   Medicare Risks and Personalized Health Plan  CMS Preventative Services Quick Reference  Advance Directive Discussion  Breast Cancer/Mammogram Screening  Fall Risk  Tobacco Use/Dependance (use dotphrase .tobaccocessation for documentation)    The above risks/problems have been discussed with the patient.  Pertinent information has been shared with the patient in the After Visit Summary.  Follow up plans and orders are seen below in the Assessment/Plan Section.    Diagnoses and all orders for this visit:    1. Medicare annual wellness visit, subsequent (Primary)  -     Mammo Screening Bilateral With CAD  -     CBC & Differential  -     Comprehensive Metabolic Panel  -     Lipid Panel  -     TSH    2. Encounter for  screening mammogram for malignant neoplasm of breast  -     Mammo Screening Bilateral With CAD    3. Tobacco use  -     CBC & Differential    4. Tremor of both hands  -     CBC & Differential  -     Comprehensive Metabolic Panel  -     TSH  -     Ambulatory Referral to Neurology    5. Loss of appetite  -     Ambulatory Referral to Gastroenterology  -     CBC & Differential  -     Comprehensive Metabolic Panel    6. Nausea  -     Ambulatory Referral to Gastroenterology  -     CBC & Differential  -     Comprehensive Metabolic Panel  -     sucralfate (Carafate) 1 g tablet; Take 1 tablet by mouth 4 (Four) Times a Day.  Dispense: 120 tablet; Refill: 2  -     ondansetron ODT (Zofran ODT) 4 MG disintegrating tablet; Place 1 tablet on the tongue Every 8 (Eight) Hours As Needed for Nausea or Vomiting.  Dispense: 30 tablet; Refill: 2    7. Gastroesophageal reflux disease without esophagitis  -     Ambulatory Referral to Gastroenterology  -     CBC & Differential  -     Comprehensive Metabolic Panel    8. Epigastric burning sensation  -     sucralfate (Carafate) 1 g tablet; Take 1 tablet by mouth 4 (Four) Times a Day.  Dispense: 120 tablet; Refill: 2      Follow Up:  Return in about 1 year (around 8/17/2022), or if symptoms worsen or fail to improve.     An After Visit Summary and PPPS were given to the patient.

## 2021-08-17 NOTE — PROGRESS NOTES
Venipuncture Blood Specimen Collection  Venipuncture performed in left arm  by Karmen Dooley with good hemostasis. Patient tolerated the procedure well without complications.   08/17/21   Karmen Dooley

## 2021-08-17 NOTE — PROGRESS NOTES
Chief Complaint  Shaking (Hands are shaking more and would like to be referred to neurologist.) and GI Problem (Stomachs burning and nausea with meals. would like to see Gastro )    Subjective            Faith ORTIZ Juan Alegria presents to [facility]  Pt here for the wellness exam today--without a pap--R/T prior hysterectomy not due to cancer--    Then needs the renewed referral to the neurologist for the hand tremors--pt reports is anthony freq and more predominant and more noticeable to family and if holding a cup--and spill things     Then also referral back to Dr Luis Enrique Valenzuela as she has been experiencing loss of appetite and nausea after eating and burning of the stomach--pt concerned for an ulcer--and just had her cscope last year and good for 5 yrs but no EGD done in a long while--and pt not tried anything      Also needs a mammo ordered as last one was 2 yrs ago     Still a smoker but done to 1/2 PPD and pt really does not want to try any meds and will just try on own when ready       PHQ-2 Total Score: 0  PHQ-9 Total Score: 0    Past Medical History:   Diagnosis Date   • Arthritis    • Cataract    • GERD (gastroesophageal reflux disease)    • Hyperlipidemia        No Known Allergies     Past Surgical History:   Procedure Laterality Date   • BACK SURGERY      LOW BACK DISC   • CATARACT EXTRACTION     • FOOT SURGERY     • HYSTERECTOMY      DUE TO CANCER   • SHOULDER SURGERY          Social History     Tobacco Use   • Smoking status: Current Every Day Smoker     Packs/day: 0.50     Years: 20.00     Pack years: 10.00   • Smokeless tobacco: Never Used   Vaping Use   • Vaping Use: Never used   Substance Use Topics   • Alcohol use: Never   • Drug use: Never       Family History   Problem Relation Age of Onset   • Depression Mother    • Hyperlipidemia Mother    • Hypertension Mother    • Alcohol abuse Father    • Depression Sister    • Hyperlipidemia Sister    • Hypertension Sister    •  Osteoporosis Sister    • Depression Brother    • Hypertension Brother    • Alcohol abuse Brother    • Alcohol abuse Maternal Grandfather    • Alcohol abuse Paternal Grandfather         Health Maintenance Due   Topic Date Due   • ZOSTER VACCINE (1 of 2) Never done   • MAMMOGRAM  07/16/2021   • DXA SCAN  07/16/2021   • ANNUAL WELLNESS VISIT  Never done        Current Outpatient Medications on File Prior to Visit   Medication Sig   • [DISCONTINUED] pravastatin (PRAVACHOL) 20 MG tablet pravastatin 20 mg oral tablet take 1 tablet (20 mg) by oral route once daily   Suspended   • [DISCONTINUED] TiZANidine (ZANAFLEX) 2 MG capsule tizanidine 2 mg oral capsule take 1 capsule by oral route 2 times a day   Suspended   • [DISCONTINUED] traMADol (ULTRAM) 50 MG tablet tramadol 50 mg oral tablet take 1 tablet (50 mg) by oral route every 6 hours as needed   Suspended     No current facility-administered medications on file prior to visit.       Immunization History   Administered Date(s) Administered   • COVID-19 (PFIZER) 04/24/2021, 05/15/2021   • Influenza, Unspecified 10/15/2020   • Pneumococcal Polysaccharide (PPSV23) 07/23/2019       [facility]  Review of Systems   Constitutional: Positive for appetite change and unexpected weight loss. Negative for activity change, chills, diaphoresis, fatigue, fever and unexpected weight gain.   HENT: Negative.    Eyes: Negative.    Respiratory: Negative.    Cardiovascular: Negative.    Gastrointestinal: Positive for nausea and GERD. Negative for abdominal distention, abdominal pain, anal bleeding, blood in stool, constipation, diarrhea, rectal pain, vomiting and indigestion.        Burning and also a rectal spasms and at times after eating bloating    Endocrine: Negative.    Genitourinary: Positive for amenorrhea and urgency. Negative for breast discharge, breast lump, breast pain, decreased libido, decreased urine volume, difficulty urinating, dyspareunia, dysuria, flank pain, frequency,  genital sores, hematuria, menstrual problem, pelvic pain, pelvic pressure, urinary incontinence, vaginal bleeding, vaginal discharge and vaginal pain.        Night sweats --and surgical post menopause    Musculoskeletal: Negative for gait problem, neck stiffness and bursitis.   Skin: Negative.    Allergic/Immunologic: Negative.    Neurological: Positive for tremors. Negative for dizziness, seizures, syncope, facial asymmetry, speech difficulty, weakness, light-headedness, numbness, headache, memory problem and confusion.   Hematological: Negative.    Psychiatric/Behavioral: Negative.         Objective     /82   Pulse 68   Temp 97.3 °F (36.3 °C)   Wt 57.2 kg (126 lb)   SpO2 97%   BMI 27.27 kg/m²       Physical Exam    Result Review :                          Assessment and Plan      Diagnoses and all orders for this visit:    1. Medicare annual wellness visit, subsequent (Primary)  -     Mammo Screening Bilateral With CAD  -     CBC & Differential  -     Comprehensive Metabolic Panel  -     Lipid Panel  -     TSH    2. Encounter for screening mammogram for malignant neoplasm of breast  -     Mammo Screening Bilateral With CAD    3. Tobacco use  -     CBC & Differential    4. Tremor of both hands  -     CBC & Differential  -     Comprehensive Metabolic Panel  -     TSH  -     Ambulatory Referral to Neurology    5. Loss of appetite  -     Ambulatory Referral to Gastroenterology  -     CBC & Differential  -     Comprehensive Metabolic Panel    6. Nausea  -     Ambulatory Referral to Gastroenterology  -     CBC & Differential  -     Comprehensive Metabolic Panel  -     sucralfate (Carafate) 1 g tablet; Take 1 tablet by mouth 4 (Four) Times a Day.  Dispense: 120 tablet; Refill: 2  -     ondansetron ODT (Zofran ODT) 4 MG disintegrating tablet; Place 1 tablet on the tongue Every 8 (Eight) Hours As Needed for Nausea or Vomiting.  Dispense: 30 tablet; Refill: 2    7. Gastroesophageal reflux disease without  esophagitis  -     Ambulatory Referral to Gastroenterology  -     CBC & Differential  -     Comprehensive Metabolic Panel    8. Epigastric burning sensation  -     sucralfate (Carafate) 1 g tablet; Take 1 tablet by mouth 4 (Four) Times a Day.  Dispense: 120 tablet; Refill: 2            Follow Up     Return in about 1 year (around 8/17/2022), or if symptoms worsen or fail to improve.

## 2021-08-17 NOTE — PATIENT INSTRUCTIONS
Steps to Quit Smoking  Smoking tobacco is the leading cause of preventable death. It can affect almost every organ in the body. Smoking puts you and those around you at risk for developing many serious chronic diseases. Quitting smoking can be difficult, but it is one of the best things that you can do for your health. It is never too late to quit.  How do I get ready to quit?  When you decide to quit smoking, create a plan to help you succeed. Before you quit:  · Pick a date to quit. Set a date within the next 2 weeks to give you time to prepare.  · Write down the reasons why you are quitting. Keep this list in places where you will see it often.  · Tell your family, friends, and co-workers that you are quitting. Support from your loved ones can make quitting easier.  · Talk with your health care provider about your options for quitting smoking.  · Find out what treatment options are covered by your health insurance.  · Identify people, places, things, and activities that make you want to smoke (triggers). Avoid them.  What first steps can I take to quit smoking?  · Throw away all cigarettes at home, at work, and in your car.  · Throw away smoking accessories, such as ashtrays and lighters.  · Clean your car. Make sure to empty the ashtray.  · Clean your home, including curtains and carpets.  What strategies can I use to quit smoking?  Talk with your health care provider about combining strategies, such as taking medicines while you are also receiving in-person counseling. Using these two strategies together makes you more likely to succeed in quitting than if you used either strategy on its own.  · If you are pregnant or breastfeeding, talk with your health care provider about finding counseling or other support strategies to quit smoking. Do not take medicine to help you quit smoking unless your health care provider tells you to do so.  To quit smoking:  Quit right away  · Quit smoking completely, instead of  gradually reducing how much you smoke over a period of time. Research shows that stopping smoking right away is more successful than gradually quitting.  · Attend in-person counseling to help you build problem-solving skills. You are more likely to succeed in quitting if you attend counseling sessions regularly. Even short sessions of 10 minutes can be effective.  Take medicine  You may take medicines to help you quit smoking. Some medicines require a prescription and some you can purchase over-the-counter. Medicines may have nicotine in them to replace the nicotine in cigarettes. Medicines may:  · Help to stop cravings.  · Help to relieve withdrawal symptoms.  Your health care provider may recommend:  · Nicotine patches, gum, or lozenges.  · Nicotine inhalers or sprays.  · Non-nicotine medicine that is taken by mouth.  Find resources  Find resources and support systems that can help you to quit smoking and remain smoke-free after you quit. These resources are most helpful when you use them often. They include:  · Online chats with a counselor.  · Telephone quitlines.  · Printed self-help materials.  · Support groups or group counseling.  · Text messaging programs.  · Mobile phone apps or applications. Use apps that can help you stick to your quit plan by providing reminders, tips, and encouragement. There are many free apps for mobile devices as well as websites. Examples include Quit Guide from the CDC and smokefree.gov  What things can I do to make it easier to quit?    · Reach out to your family and friends for support and encouragement. Call telephone quitlines (0-309-QUIT-NOW), reach out to support groups, or work with a counselor for support.  · Ask people who smoke to avoid smoking around you.  · Avoid places that trigger you to smoke, such as bars, parties, or smoke-break areas at work.  · Spend time with people who do not smoke.  · Lessen the stress in your life. Stress can be a smoking trigger for some  people. To lessen stress, try:  ? Exercising regularly.  ? Doing deep-breathing exercises.  ? Doing yoga.  ? Meditating.  ? Performing a body scan. This involves closing your eyes, scanning your body from head to toe, and noticing which parts of your body are particularly tense. Try to relax the muscles in those areas.  How will I feel when I quit smoking?  Day 1 to 3 weeks  Within the first 24 hours of quitting smoking, you may start to feel withdrawal symptoms. These symptoms are usually most noticeable 2-3 days after quitting, but they usually do not last for more than 2-3 weeks. You may experience these symptoms:  · Mood swings.  · Restlessness, anxiety, or irritability.  · Trouble concentrating.  · Dizziness.  · Strong cravings for sugary foods and nicotine.  · Mild weight gain.  · Constipation.  · Nausea.  · Coughing or a sore throat.  · Changes in how the medicines that you take for unrelated issues work in your body.  · Depression.  · Trouble sleeping (insomnia).  Week 3 and afterward  After the first 2-3 weeks of quitting, you may start to notice more positive results, such as:  · Improved sense of smell and taste.  · Decreased coughing and sore throat.  · Slower heart rate.  · Lower blood pressure.  · Clearer skin.  · The ability to breathe more easily.  · Fewer sick days.  Quitting smoking can be very challenging. Do not get discouraged if you are not successful the first time. Some people need to make many attempts to quit before they achieve long-term success. Do your best to stick to your quit plan, and talk with your health care provider if you have any questions or concerns.  Summary  · Smoking tobacco is the leading cause of preventable death. Quitting smoking is one of the best things that you can do for your health.  · When you decide to quit smoking, create a plan to help you succeed.  · Quit smoking right away, not slowly over a period of time.  · When you start quitting, seek help from your  health care provider, family, or friends.  This information is not intended to replace advice given to you by your health care provider. Make sure you discuss any questions you have with your health care provider.  Document Revised: 09/11/2020 Document Reviewed: 03/07/2020  Elsevier Patient Education © 2021 [employer]

## 2021-08-18 LAB
ALBUMIN SERPL-MCNC: 4.1 G/DL (ref 3.5–5.2)
ALBUMIN/GLOB SERPL: 1.7 G/DL
ALP SERPL-CCNC: 107 U/L (ref 39–117)
ALT SERPL W P-5'-P-CCNC: 13 U/L (ref 1–33)
ANION GAP SERPL CALCULATED.3IONS-SCNC: 6.6 MMOL/L (ref 5–15)
AST SERPL-CCNC: 20 U/L (ref 1–32)
BILIRUB SERPL-MCNC: <0.2 MG/DL (ref 0–1.2)
BUN SERPL-MCNC: 9 MG/DL (ref 8–23)
BUN/CREAT SERPL: 11.5 (ref 7–25)
CALCIUM SPEC-SCNC: 9.7 MG/DL (ref 8.6–10.5)
CHLORIDE SERPL-SCNC: 108 MMOL/L (ref 98–107)
CHOLEST SERPL-MCNC: 219 MG/DL (ref 0–200)
CO2 SERPL-SCNC: 26.4 MMOL/L (ref 22–29)
CREAT SERPL-MCNC: 0.78 MG/DL (ref 0.57–1)
GFR SERPL CREATININE-BSD FRML MDRD: 74 ML/MIN/1.73
GLOBULIN UR ELPH-MCNC: 2.4 GM/DL
GLUCOSE SERPL-MCNC: 95 MG/DL (ref 65–99)
HDLC SERPL-MCNC: 47 MG/DL (ref 40–60)
LDLC SERPL CALC-MCNC: 144 MG/DL (ref 0–100)
LDLC/HDLC SERPL: 3 {RATIO}
POTASSIUM SERPL-SCNC: 4.7 MMOL/L (ref 3.5–5.2)
PROT SERPL-MCNC: 6.5 G/DL (ref 6–8.5)
SODIUM SERPL-SCNC: 141 MMOL/L (ref 136–145)
TRIGL SERPL-MCNC: 155 MG/DL (ref 0–150)
TSH SERPL DL<=0.05 MIU/L-ACNC: 2.45 UIU/ML (ref 0.27–4.2)
VLDLC SERPL-MCNC: 28 MG/DL (ref 5–40)

## 2021-08-18 NOTE — PROGRESS NOTES
Please mail letter to patient stating    Faith your thyroid levels were normal range; the comprehensive panel reveals normal kidney function liver function and electrolytes and glucose; your complete blood counts revealed normal range white blood cell red blood cell hemoglobin hematocrit and platelet counts    And then the cholesterol levels were elevated with the total cholesterol at 219 and it should be 200 or less your triglycerides were at 155 and they should be less than 150 the LDL should be greater than 50 in postmenopausal women and it was at 47 and the LDL was 144 and it should be less than 100 when fasting and I know that you told me that you do not want to start a statin/cholesterol medication so I would just continue trying to stay as active as you can and to do a low-cholesterol diet--should you change your mind regarding a statin or cholesterol medication please let me know

## 2021-08-24 ENCOUNTER — HOSPITAL ENCOUNTER (OUTPATIENT)
Dept: MAMMOGRAPHY | Facility: HOSPITAL | Age: 66
Discharge: HOME | End: 2021-08-24
Admitting: NURSE PRACTITIONER

## 2021-08-24 PROCEDURE — 77067 SCR MAMMO BI INCL CAD: CPT

## 2021-08-25 NOTE — PROGRESS NOTES
Please mail letter to patient stating    Faith, your mammogram was benign and they did recommend continuing your yearly mammogram screenings and then I would like for you to continue your monthly self breast exams

## 2021-09-20 ENCOUNTER — OFFICE VISIT (OUTPATIENT)
Dept: NEUROLOGY | Facility: CLINIC | Age: 66
End: 2021-09-20

## 2021-09-20 VITALS
SYSTOLIC BLOOD PRESSURE: 150 MMHG | WEIGHT: 124.9 LBS | DIASTOLIC BLOOD PRESSURE: 87 MMHG | BODY MASS INDEX: 28.09 KG/M2 | HEIGHT: 56 IN | HEART RATE: 101 BPM

## 2021-09-20 DIAGNOSIS — G20 PARKINSONISM, UNSPECIFIED PARKINSONISM TYPE (HCC): Primary | ICD-10-CM

## 2021-09-20 PROBLEM — G20.C PARKINSONISM: Status: ACTIVE | Noted: 2021-09-20

## 2021-09-20 PROCEDURE — 99215 OFFICE O/P EST HI 40 MIN: CPT | Performed by: NURSE PRACTITIONER

## 2021-09-20 NOTE — PATIENT INSTRUCTIONS
Parkinson's Disease  Parkinson's disease is a type of movement disorder. It is a long-term condition that gets worse over time (is progressive). Each person with Parkinson's disease is affected differently.  This condition limits your ability to control movements and move your body normally. The condition can range from mild to severe. Parkinson's disease tends to get worse slowly over several years.  What are the causes?  Parkinson's disease results from a loss of brain cells (neurons) that make a brain chemical called dopamine. Dopamine is needed to control movement. As the condition gets worse, neurons make less dopamine. This makes it hard to move or control your movements.  The exact cause of the loss of neurons and why they make less dopamine is not known. Factors related to genes and the environment may contribute to the cause of Parkinson's disease.  What increases the risk?  The following factors may make you more likely to develop this condition:  · Being male.  · Being age 60 or older.  · Having a family history of Parkinson's disease.  · Having had a traumatic brain injury.  · Having experienced depression.  · Having been exposed to toxins, such as pesticides.  What are the signs or symptoms?  Symptoms of this condition can vary. The main symptoms are related to movement. These include:  · A tremor or shaking while you are resting that you cannot control.  · Stiffness in your neck, arms, and legs (rigidity).  · Slowing of movement. You may lose facial expressions and have trouble making small movements that are needed to button clothing or brush your teeth.  · An abnormal walk. You may walk with short, shuffling steps.  · Loss of balance and stability when standing. You may sway, fall backward, and have trouble making turns.  Other symptoms include:  · Mental or cognitive changes including depression, anxiety, having false beliefs (delusions), or seeing, hearing, or feeling things that do not exist  (hallucinations).  · Trouble speaking or swallowing.  · Changes in bowel or bladder functions including constipation, having to go urgently or frequently, or not being able to control your bowel or bladder.  · Changes in sleep habits or trouble sleeping.  Parkinson's disease may be graded by severity of your condition as mild, moderate, or advanced. Parkinson's disease progression is different for everyone. You may not progress to the advanced stage.  · Mild Parkinson's disease involves:  ? Movement problems that do not affect daily activities.  ? Movement problems on one side of the body.  · Moderate Parkinson's disease involves:  ? Movement problems on both sides of the body.  ? Slowing of movement.  ? Coordination and balance problems.  · Advanced Parkinson's disease involves:  ? Extreme difficulty walking.  ? Inability to live alone safely.  ? Signs of dementia, such as having trouble remembering things, doing daily tasks such as getting dressed, and problem solving.  How is this diagnosed?  This condition is diagnosed by a specialist. A diagnosis may be made based on symptoms, your medical history, and a physical exam. You may also have brain imaging tests to check for a loss of dopamine-producing areas of the brain.  How is this treated?  There is no cure for Parkinson's disease. Treatment focuses on managing your symptoms. Treatment may include:  · Medicines. Everyone responds to medicines differently. Your response may change over time. Work with your health care provider to find the best medicines for you.  · Speech, occupational, and physical therapy.  · Deep brain stimulation surgery to reduce tremors and other involuntary movements.  Follow these instructions at home:  Medicines  · Take over-the-counter and prescription medicines only as told by your health care provider.  · Avoid taking medicines that can affect thinking, such as pain or sleeping medicines.  Eating and drinking  · Follow instructions  from your health care provider about eating or drinking restrictions.  · Do not drink alcohol.  Activity  · Talk with your health care provider about if it is safe for you to drive.  · Do exercises as told by your health care provider or physical therapist.  Lifestyle         · Install grab bars and railings in your home to prevent falls.  · Do not use any products that contain nicotine or tobacco, such as cigarettes, e-cigarettes, and chewing tobacco. If you need help quitting, ask your health care provider.  · Consider joining a support group for people with Parkinson's disease.  General instructions  · Work with your health care provider to determine what you need help with and what your safety needs are.  · Keep all follow-up visits as told by your health care provider, including any visits with a physical therapist, speech therapist, or occupational therapist. This is important.  Contact a health care provider if:  · Medicines do not help your symptoms.  · You are unsteady or have fallen at home.  · You need more support to function well at home.  · You have trouble swallowing.  · You have severe constipation.  · You are having problems with side effects from your medicines.  · You feel confused, anxious, or depressed.  Get help right away if you:  · Are injured after a fall.  · See or hear things that are not real.  · Cannot swallow without choking.  · Have chest pain or trouble breathing.  · Do not feel safe at home.  · Have thoughts about hurting yourself or others.  If you ever feel like you may hurt yourself or others, or have thoughts about taking your own life, get help right away. You can go to your nearest emergency department or call:  · Your local emergency services (911 in the [place]  · A suicide crisis helpline, such as the National Suicide Prevention Lifeline at 1-106.346.8373. This is open 24 hours a day.  Summary  · Parkinson's disease is a long-term condition that gets worse over time. This  condition limits your ability to control your movements and move your body normally.  · There is no cure for Parkinson's disease. Treatment focuses on managing your symptoms.  · Work with your health care provider to determine what you need help with and what your safety needs are.  · Keep all follow-up visits as told by your health care provider, including any visits with a physical therapist, speech therapist, or occupational therapist. This is important.  This information is not intended to replace advice given to you by your health care provider. Make sure you discuss any questions you have with your health care provider.  Document Revised: 03/05/2020 Document Reviewed: 03/05/2020  Elsevier Patient Education © 2021 [employer]

## 2021-09-20 NOTE — PROGRESS NOTES
"Chief Complaint  Tremors    Subjective          Faith J Juan Alegria presents to [facility]  Tremor started about 4 years ago.  Started with her head.  States her daughter first noticed it.  At first, she didn't notice it or was bothered by it.  Saw Dr. Ocasio previously.  About three years ago tremor started to bilateral hands.  States tremor has worsened recently.  Tremor worsens with intention.  Denies falls.  Denies any symptoms in her legs.  Mostly just the hands and head.  Denies freezing gait.  Endorses mild constipation.        Objective   Vital Signs:   /87   Pulse 101   Ht 142.2 cm (56\")   Wt 56.7 kg (124 lb 14.4 oz)   BMI 28.00 kg/m²     Physical Exam  HENT:      Head: Normocephalic.   Pulmonary:      Effort: Pulmonary effort is normal.   Neurological:      Mental Status: She is alert and oriented to person, place, and time.      Cranial Nerves: Cranial nerves are intact.      Sensory: Sensation is intact.      Motor: Motor function is intact.      Coordination: Coordination is intact.      Deep Tendon Reflexes: Reflexes are normal and symmetric.        Neurologic Exam     Mental Status   Oriented to person, place, and time.     Gait, Coordination, and Reflexes     Tremor   Resting tremor: present  Intention tremor: presentMild bradykinesia noted to bilateral upper and lower extremities.  Narrow base, narrow stride length with gait.  Decreased arm swing on left side. Mild rigidity.         Result Review :               Assessment and Plan    Diagnoses and all orders for this visit:    1. Parkinsonism, unspecified Parkinsonism type (CMS/HCC) (Primary)  Assessment & Plan:  Appears parkinsonian today.  Will trial CD-LD and reassess in follow-up.  Lengthy discussion regarding parkinson's disease and education regarding treatment.        Other orders  -     carbidopa-levodopa (SINEMET)  MG per tablet; Take 1/2 tab three times daily for 2 weeks, then " increase to 1 tab three times daily.  Dispense: 90 tablet; Refill: 3    I spent 45 minutes caring for Faith on this date of service. This time includes time spent by me in the following activities:preparing for the visit, reviewing tests, obtaining and/or reviewing a separately obtained history, performing a medically appropriate examination and/or evaluation , counseling and educating the patient/family/caregiver, ordering medications, tests, or procedures and documenting information in the medical record  Follow Up   Return in about 6 weeks (around 11/1/2021) for parkinsons.  Patient was given instructions and counseling regarding her condition or for health maintenance advice. Please see specific information pulled into the AVS if appropriate.

## 2021-09-23 NOTE — ASSESSMENT & PLAN NOTE
Appears parkinsonian today.  Will trial CD-LD and reassess in follow-up.  Lengthy discussion regarding parkinson's disease and education regarding treatment.

## 2021-11-10 ENCOUNTER — TELEPHONE (OUTPATIENT)
Dept: NEUROLOGY | Facility: CLINIC | Age: 66
End: 2021-11-10

## 2021-11-10 ENCOUNTER — OFFICE VISIT (OUTPATIENT)
Dept: NEUROLOGY | Facility: CLINIC | Age: 66
End: 2021-11-10

## 2021-11-10 VITALS
BODY MASS INDEX: 26.47 KG/M2 | HEART RATE: 97 BPM | SYSTOLIC BLOOD PRESSURE: 135 MMHG | HEIGHT: 57 IN | WEIGHT: 122.7 LBS | DIASTOLIC BLOOD PRESSURE: 84 MMHG

## 2021-11-10 DIAGNOSIS — G20 PARKINSON'S DISEASE (HCC): Primary | ICD-10-CM

## 2021-11-10 PROBLEM — G20.A1 PARKINSON'S DISEASE: Status: ACTIVE | Noted: 2021-09-20

## 2021-11-10 PROCEDURE — 99214 OFFICE O/P EST MOD 30 MIN: CPT | Performed by: NURSE PRACTITIONER

## 2021-11-10 NOTE — PATIENT INSTRUCTIONS
Parkinson's Disease  Parkinson's disease is a type of movement disorder. It is a long-term condition that gets worse over time (is progressive). Each person with Parkinson's disease is affected differently.  This condition limits your ability to control movements and move your body normally. The condition can range from mild to severe. Parkinson's disease tends to get worse slowly over several years.  What are the causes?  Parkinson's disease results from a loss of brain cells (neurons) that make a brain chemical called dopamine. Dopamine is needed to control movement. As the condition gets worse, neurons make less dopamine. This makes it hard to move or control your movements.  The exact cause of the loss of neurons and why they make less dopamine is not known. Factors related to genes and the environment may contribute to the cause of Parkinson's disease.  What increases the risk?  The following factors may make you more likely to develop this condition:  · Being male.  · Being age 60 or older.  · Having a family history of Parkinson's disease.  · Having had a traumatic brain injury.  · Having experienced depression.  · Having been exposed to toxins, such as pesticides.  What are the signs or symptoms?  Symptoms of this condition can vary. The main symptoms are related to movement. These include:  · A tremor or shaking while you are resting that you cannot control.  · Stiffness in your neck, arms, and legs (rigidity).  · Slowing of movement. You may lose facial expressions and have trouble making small movements that are needed to button clothing or brush your teeth.  · An abnormal walk. You may walk with short, shuffling steps.  · Loss of balance and stability when standing. You may sway, fall backward, and have trouble making turns.  Other symptoms include:  · Mental or cognitive changes including depression, anxiety, having false beliefs (delusions), or seeing, hearing, or feeling things that do not exist  (hallucinations).  · Trouble speaking or swallowing.  · Changes in bowel or bladder functions including constipation, having to go urgently or frequently, or not being able to control your bowel or bladder.  · Changes in sleep habits or trouble sleeping.  Parkinson's disease may be graded by severity of your condition as mild, moderate, or advanced. Parkinson's disease progression is different for everyone. You may not progress to the advanced stage.  · Mild Parkinson's disease involves:  ? Movement problems that do not affect daily activities.  ? Movement problems on one side of the body.  · Moderate Parkinson's disease involves:  ? Movement problems on both sides of the body.  ? Slowing of movement.  ? Coordination and balance problems.  · Advanced Parkinson's disease involves:  ? Extreme difficulty walking.  ? Inability to live alone safely.  ? Signs of dementia, such as having trouble remembering things, doing daily tasks such as getting dressed, and problem solving.  How is this diagnosed?  This condition is diagnosed by a specialist. A diagnosis may be made based on symptoms, your medical history, and a physical exam. You may also have brain imaging tests to check for a loss of dopamine-producing areas of the brain.  How is this treated?  There is no cure for Parkinson's disease. Treatment focuses on managing your symptoms. Treatment may include:  · Medicines. Everyone responds to medicines differently. Your response may change over time. Work with your health care provider to find the best medicines for you.  · Speech, occupational, and physical therapy.  · Deep brain stimulation surgery to reduce tremors and other involuntary movements.  Follow these instructions at home:  Medicines  · Take over-the-counter and prescription medicines only as told by your health care provider.  · Avoid taking medicines that can affect thinking, such as pain or sleeping medicines.  Eating and drinking  · Follow instructions  from your health care provider about eating or drinking restrictions.  · Do not drink alcohol.  Activity  · Talk with your health care provider about if it is safe for you to drive.  · Do exercises as told by your health care provider or physical therapist.  Lifestyle         · Install grab bars and railings in your home to prevent falls.  · Do not use any products that contain nicotine or tobacco, such as cigarettes, e-cigarettes, and chewing tobacco. If you need help quitting, ask your health care provider.  · Consider joining a support group for people with Parkinson's disease.  General instructions  · Work with your health care provider to determine what you need help with and what your safety needs are.  · Keep all follow-up visits as told by your health care provider, including any visits with a physical therapist, speech therapist, or occupational therapist. This is important.  Contact a health care provider if:  · Medicines do not help your symptoms.  · You are unsteady or have fallen at home.  · You need more support to function well at home.  · You have trouble swallowing.  · You have severe constipation.  · You are having problems with side effects from your medicines.  · You feel confused, anxious, or depressed.  Get help right away if you:  · Are injured after a fall.  · See or hear things that are not real.  · Cannot swallow without choking.  · Have chest pain or trouble breathing.  · Do not feel safe at home.  · Have thoughts about hurting yourself or others.  If you ever feel like you may hurt yourself or others, or have thoughts about taking your own life, get help right away. You can go to your nearest emergency department or call:  · Your local emergency services (911 in the [place]  · A suicide crisis helpline, such as the National Suicide Prevention Lifeline at 1-688.804.6864. This is open 24 hours a day.  Summary  · Parkinson's disease is a long-term condition that gets worse over time. This  condition limits your ability to control your movements and move your body normally.  · There is no cure for Parkinson's disease. Treatment focuses on managing your symptoms.  · Work with your health care provider to determine what you need help with and what your safety needs are.  · Keep all follow-up visits as told by your health care provider, including any visits with a physical therapist, speech therapist, or occupational therapist. This is important.  This information is not intended to replace advice given to you by your health care provider. Make sure you discuss any questions you have with your health care provider.  Document Revised: 03/05/2020 Document Reviewed: 03/05/2020  Elsevier Patient Education © 2021 [employer]

## 2021-11-10 NOTE — TELEPHONE ENCOUNTER
Instructions for sinemet state to take 1 tab 4 times per day and instructions say to take 1/2 tab 3 times per day for 2 weeks, then increase to 1 tab 3 times per day.

## 2021-11-10 NOTE — PROGRESS NOTES
"Chief Complaint  Tremors    Subjective          Faith J Juan Alegria presents to [facility]  States she has seen significant improvement after starting Carbidopa-levodopa at previous visit.  Taking 1 tablet at 7, 11 and 3.  Feels as if she wears off in the evenings.  Tremor is improved. Denies falls.  Denies freezing gait.      Interval History  Tremor started about 4 years ago.  Started with her head.  States her daughter first noticed it.  At first, she didn't notice it or was bothered by it.  Saw Dr. Ocasio previously.  About three years ago tremor started to bilateral hands.  States tremor has worsened recently.  Tremor worsens with intention.  Denies falls.  Denies any symptoms in her legs.  Mostly just the hands and head.  Denies freezing gait.  Endorses mild constipation.        Objective   Vital Signs:   /84   Pulse 97   Ht 144.8 cm (57\")   Wt 55.7 kg (122 lb 11.2 oz)   BMI 26.55 kg/m²     Physical Exam  HENT:      Head: Normocephalic.   Pulmonary:      Effort: Pulmonary effort is normal.   Neurological:      Mental Status: She is alert and oriented to person, place, and time.      Cranial Nerves: Cranial nerves are intact.      Sensory: Sensation is intact.      Motor: Motor function is intact.      Coordination: Coordination is intact.      Deep Tendon Reflexes: Reflexes are normal and symmetric.        Neurologic Exam     Mental Status   Oriented to person, place, and time.     Gait, Coordination, and Reflexes     Tremor   Resting tremor: present  Intention tremor: presentMild bradykinesia noted to bilateral upper and lower extremities.  Narrow base, narrow stride length with gait.  Decreased arm swing on left side. Mild rigidity.         Result Review :               Assessment and Plan    Diagnoses and all orders for this visit:    1. Parkinson's disease (HCC) (Primary)  Assessment & Plan:  Wearing off in the evenings.  Will add fourth dose of CD-LD. "  Still looks fairly bradykinetic on assessment.  Will consider increasing CD-LD to 1.5 tabs per dose in f/u if needed.       Other orders  -     carbidopa-levodopa (SINEMET)  MG per tablet; Take 1 tablet by mouth 4 (Four) Times a Day. Take 1/2 tab three times daily for 2 weeks, then increase to 1 tab three times daily.  Dispense: 120 tablet; Refill: 3      Follow Up   Return in about 2 months (around 1/10/2022) for parkinson's f/u.  Patient was given instructions and counseling regarding her condition or for health maintenance advice. Please see specific information pulled into the AVS if appropriate.

## 2021-11-10 NOTE — TELEPHONE ENCOUNTER
Pharmacy Name: [place], [place] - [address] [place] [address] [phone] [facility] [phone]      Pharmacy representative name: MIKE    Pharmacy representative phone number: [phone]    What medication are you calling in regards to: CARBIDOPA-LEVODOPA (SINEMAT) 25-100MG PER TABLET    What question does the pharmacy have: PHARMACY IS CALLING TO CLARIFY THE MEDICATION INSTRUCTIONS    Who is the provider that prescribed the medication: VEL DE GUZMAN    PLEASE REVIEW AND ADVISE.

## 2021-11-10 NOTE — ASSESSMENT & PLAN NOTE
Wearing off in the evenings.  Will add fourth dose of CD-LD.  Still looks fairly bradykinetic on assessment.  Will consider increasing CD-LD to 1.5 tabs per dose in f/u if needed.

## 2021-11-11 NOTE — TELEPHONE ENCOUNTER
I called and notified the pts pharmacy and asked that they call with further questions and she said they would.

## 2021-11-17 NOTE — PROGRESS NOTES
Chief Complaint   Gastroesophageal reflux disease, and nausea    History of Present Illness       Faith Alegria is a 66 y.o. female who presents to [facility] for follow-up after period of absence with a history of diverticulosis, colon polyps, gastritis, H. pylori infection, nausea, vomiting, poor appetite and newly diagnosed with Parkinson's.  Patient reports that over the past year she has had epigastric pain nausea intermittent vomiting and poor appetite.  She takes Zofran as needed with no relief.  She reports 8 weeks ago she was officially diagnosed with Parkinson's and begin medication for treatment.  She reports slightly altered bowel habits with constipation but when she started hydrating more adding fiber her bowel habits returned to normal.  She reports history of gastric polyps and was seen by Dr. Lincoln for about 4 years but then discontinued visits.  Patient denies fever, weight loss, night sweats, melena, hematochezia, hematemesis.    Colonoscopy: Review of the patient's most recent colonoscopy performed by Dr. Valenzuela on 03/02/2020 a single 3 mm polyp in the transverse colon.  Grade 1 internal hemorrhoids and diverticula.  Repeat in 5 years.  Ovular adenoma on pathology.    EGD: Review of the patient's most recent EGD performed by Dr. Lopez on 11/09/2015 small 1.5 cm hiatal hernia gastritis history of H. pylori.    Labs performed on 08/17/2021 see below    Abdominal ultrasound performed on 06/02/2021 normal.       Results       Result Review :       CMP    CMP 8/17/21   Glucose 95   BUN 9   Creatinine 0.78   eGFR Non African Am 74   Sodium 141   Potassium 4.7   Chloride 108 (A)   Calcium 9.7   Albumin 4.10   Total Bilirubin <0.2   Alkaline Phosphatase 107   AST (SGOT) 20   ALT (SGPT) 13   (A) Abnormal value            CBC    CBC 5/26/21 8/17/21   WBC 7.87 9.64   RBC 4.42 4.76   Hemoglobin 13.9 14.8   Hematocrit 42.7 46.6   MCV 96.6 97.9 (A)   MCH 31.4 (A)  31.1   [facility] 31.8   RDW 13.3 12.3   Platelets 286 255   (A) Abnormal value                      Past Medical History       Past Medical History:   Diagnosis Date   • Arthritis    • Cataract    • GERD (gastroesophageal reflux disease)    • Hyperlipidemia    • Parkinson's disease (HCC)        Past Surgical History:   Procedure Laterality Date   • BACK SURGERY      LOW BACK DISC   • CARPAL TUNNEL RELEASE     • CATARACT EXTRACTION     • COLONOSCOPY     • ENDOSCOPY     • FOOT SURGERY     • HYSTERECTOMY      DUE TO CANCER   • SHOULDER SURGERY     • UPPER GASTROINTESTINAL ENDOSCOPY           Current Outpatient Medications:   •  carbidopa-levodopa (SINEMET)  MG per tablet, Take 1 tablet by mouth 4 (Four) Times a Day. Take 1/2 tab three times daily for 2 weeks, then increase to 1 tab three times daily., Disp: 120 tablet, Rfl: 3  •  carbidopa-levodopa (SINEMET)  MG per tablet, Take 1 tablet by mouth 4 (Four) Times a Day. 1 tablet QID, Disp: 120 tablet, Rfl: 3  •  ondansetron ODT (Zofran ODT) 4 MG disintegrating tablet, Place 1 tablet on the tongue Every 8 (Eight) Hours As Needed for Nausea or Vomiting., Disp: 30 tablet, Rfl: 2  •  omeprazole (priLOSEC) 40 MG capsule, Take 1 capsule by mouth Daily., Disp: 30 capsule, Rfl: 4  •  sucralfate (Carafate) 1 g tablet, Take 1 tablet by mouth 4 (Four) Times a Day., Disp: 120 tablet, Rfl: 2     No Known Allergies    Family History   Problem Relation Age of Onset   • Depression Mother    • Hyperlipidemia Mother    • Hypertension Mother    • Alcohol abuse Father    • Depression Sister    • Hyperlipidemia Sister    • Hypertension Sister    • Osteoporosis Sister    • Depression Brother    • Hypertension Brother    • Alcohol abuse Brother    • Alcohol abuse Maternal Grandfather    • Alcohol abuse Paternal Grandfather    • Colon cancer Paternal Uncle         Social History     Social History Narrative   • Not on file       Objective       Vital Signs:   /73 (BP  "Location: Left arm, Patient Position: Sitting, Cuff Size: Small Adult)   Pulse 62   Ht 144.8 cm (57\")   Wt 54.9 kg (121 lb)   SpO2 100%   BMI 26.18 kg/m²       Physical Exam  Constitutional:       General: She is not in acute distress.     Appearance: Normal appearance. She is well-developed and normal weight.   Eyes:      Conjunctiva/sclera: Conjunctivae normal.      Pupils: Pupils are equal, round, and reactive to light.      Visual Fields: Right eye visual fields normal and left eye visual fields normal.   Cardiovascular:      Rate and Rhythm: Normal rate and regular rhythm.      Heart sounds: Normal heart sounds.   Pulmonary:      Effort: Pulmonary effort is normal. No retractions.      Breath sounds: Normal breath sounds and air entry.      Comments: Inspection of chest: normal appearance  Abdominal:      General: Bowel sounds are normal.      Palpations: Abdomen is soft.      Tenderness: There is no abdominal tenderness.      Comments: No appreciable hepatosplenomegaly   Musculoskeletal:      Cervical back: Neck supple.      Right lower leg: No edema.      Left lower leg: No edema.   Lymphadenopathy:      Cervical: No cervical adenopathy.   Skin:     Findings: No lesion.      Comments: Turgor normal   Neurological:      Mental Status: She is alert and oriented to person, place, and time.   Psychiatric:         Mood and Affect: Mood and affect normal.           Assessment & Plan          Assessment and Plan    Diagnoses and all orders for this visit:    1. Gastroesophageal reflux disease without esophagitis (Primary)    2. Nausea    3. Parkinson's disease (HCC)    4. Loss of appetite    5. Nausea and vomiting, intractability of vomiting not specified, unspecified vomiting type    Other orders  -     omeprazole (priLOSEC) 40 MG capsule; Take 1 capsule by mouth Daily.  Dispense: 30 capsule; Refill: 4      66-year-old female presents to the office for follow-up today after period of absence with a history of " diverticulosis, colon polyps, gastritis, H. pylori infection, nausea, vomiting, poor appetite and newly diagnosed with Parkinson's. I have recommended that the patient undergo further evaluation with an EGD.  I have discussed this procedure in detail with the patient.  I have discussed the risks, benefits and alternatives.  I have discussed the risk of anesthesia, bleeding and perforation.  Patient understands these risks, benefits and alternatives and wishes to proceed.  I will schedule her at her earliest convenience.  I have added omeprazole 40 mg daily for reflux and nausea.  Patient will continue with hydration and fiber intake if bowel habits continue with constipation or altered bowel habits we could consider repeat colonoscopy.  Patient agreeable to this plan will call with any questions or concerns.          Follow Up       Follow Up   Return for Follow up after endoscopy in office.  Patient was given instructions and counseling regarding her condition or for health maintenance advice. Please see specific information pulled into the AVS if appropriate.

## 2021-11-18 ENCOUNTER — PREP FOR SURGERY (OUTPATIENT)
Dept: OTHER | Facility: HOSPITAL | Age: 66
End: 2021-11-18

## 2021-11-18 ENCOUNTER — OFFICE VISIT (OUTPATIENT)
Dept: GASTROENTEROLOGY | Facility: CLINIC | Age: 66
End: 2021-11-18

## 2021-11-18 VITALS
HEIGHT: 57 IN | DIASTOLIC BLOOD PRESSURE: 73 MMHG | OXYGEN SATURATION: 100 % | SYSTOLIC BLOOD PRESSURE: 148 MMHG | HEART RATE: 62 BPM | BODY MASS INDEX: 26.1 KG/M2 | WEIGHT: 121 LBS

## 2021-11-18 DIAGNOSIS — R11.2 NAUSEA AND VOMITING, INTRACTABILITY OF VOMITING NOT SPECIFIED, UNSPECIFIED VOMITING TYPE: ICD-10-CM

## 2021-11-18 DIAGNOSIS — G20 PARKINSON'S DISEASE (HCC): ICD-10-CM

## 2021-11-18 DIAGNOSIS — R11.0 NAUSEA: ICD-10-CM

## 2021-11-18 DIAGNOSIS — R63.0 LOSS OF APPETITE: ICD-10-CM

## 2021-11-18 DIAGNOSIS — K21.9 GASTROESOPHAGEAL REFLUX DISEASE WITHOUT ESOPHAGITIS: Primary | ICD-10-CM

## 2021-11-18 PROCEDURE — 99214 OFFICE O/P EST MOD 30 MIN: CPT | Performed by: NURSE PRACTITIONER

## 2021-11-18 RX ORDER — OMEPRAZOLE 40 MG/1
40 CAPSULE, DELAYED RELEASE ORAL DAILY
Qty: 30 CAPSULE | Refills: 4 | Status: SHIPPED | OUTPATIENT
Start: 2021-11-18 | End: 2022-07-21

## 2021-11-18 NOTE — PATIENT INSTRUCTIONS

## 2021-12-07 ENCOUNTER — HOSPITAL ENCOUNTER (EMERGENCY)
Facility: HOSPITAL | Age: 66
Discharge: LEFT BEFORE DISCHARGE | End: 2021-12-07

## 2021-12-07 PROCEDURE — 99211 OFF/OP EST MAY X REQ PHY/QHP: CPT

## 2022-01-10 ENCOUNTER — OFFICE VISIT (OUTPATIENT)
Dept: NEUROLOGY | Facility: CLINIC | Age: 67
End: 2022-01-10

## 2022-01-10 VITALS
WEIGHT: 115.2 LBS | DIASTOLIC BLOOD PRESSURE: 80 MMHG | HEIGHT: 57 IN | SYSTOLIC BLOOD PRESSURE: 142 MMHG | HEART RATE: 90 BPM | BODY MASS INDEX: 24.85 KG/M2

## 2022-01-10 DIAGNOSIS — G20 PARKINSON'S DISEASE: Primary | ICD-10-CM

## 2022-01-10 PROCEDURE — 99213 OFFICE O/P EST LOW 20 MIN: CPT | Performed by: NURSE PRACTITIONER

## 2022-01-10 NOTE — PROGRESS NOTES
"Chief Complaint  Parkinson's Disease    Subjective          Faith Alegria presents to [facility]  States she feels as if she's moving a lot better since increasing CD-LD 1 tablet QID.  Tremor is well managed.  Denies wearing off effect.  States she's doing well.  Denies side effect.  Endorses mild constipation.  Denies freezing gait.        Interval History  Tremor started about 4 years ago.  Started with her head.  States her daughter first noticed it.  At first, she didn't notice it or was bothered by it.  Saw Dr. Ocasio previously.  About three years ago tremor started to bilateral hands.  States tremor has worsened recently.  Tremor worsens with intention.  Denies falls.  Denies any symptoms in her legs.  Mostly just the hands and head.  Denies freezing gait.  Endorses mild constipation.        Objective   Vital Signs:   /80   Pulse 90   Ht 144.8 cm (57\")   Wt 52.3 kg (115 lb 3.2 oz)   BMI 24.93 kg/m²     Physical Exam  Neurological:      Mental Status: She is oriented to person, place, and time.        Neurologic Exam     Mental Status   Oriented to person, place, and time.     Gait, Coordination, and Reflexes     Tremor   Resting tremor: present  Intention tremor: presentMild bradykinesia noted to bilateral upper and lower extremities.  Narrow base, narrow stride length with gait.  Decreased arm swing on left side. Mild rigidity.         Result Review :               Assessment and Plan    Diagnoses and all orders for this visit:    1. Parkinson's disease (HCC) (Primary)  Assessment & Plan:  Continue CD-LD at current dose.       Other orders  -     carbidopa-levodopa (SINEMET)  MG per tablet; Take 1 tablet by mouth 4 (Four) Times a Day. 1 tablet QID  Dispense: 120 tablet; Refill: 3      Follow Up   Return in about 3 months (around 4/10/2022) for parkinson's f/u.  Patient was given instructions and counseling regarding her condition or for " health maintenance advice. Please see specific information pulled into the AVS if appropriate.

## 2022-01-10 NOTE — PATIENT INSTRUCTIONS
Parkinson's Disease  Parkinson's disease is a type of movement disorder. It is a long-term condition that gets worse over time (is progressive). Each person with Parkinson's disease is affected differently.  This condition limits your ability to control movements and move your body normally. The condition can range from mild to severe. Parkinson's disease tends to get worse slowly over several years.  What are the causes?  Parkinson's disease results from a loss of brain cells (neurons) that make a brain chemical called dopamine. Dopamine is needed to control movement. As the condition gets worse, neurons make less dopamine. This makes it hard to move or control your movements.  The exact cause of the loss of neurons and why they make less dopamine is not known. Factors related to genes and the environment may contribute to the cause of Parkinson's disease.  What increases the risk?  The following factors may make you more likely to develop this condition:  · Being male.  · Being age 60 or older.  · Having a family history of Parkinson's disease.  · Having had a traumatic brain injury.  · Having experienced depression.  · Having been exposed to toxins, such as pesticides.  What are the signs or symptoms?  Symptoms of this condition can vary. The main symptoms are related to movement. These include:  · A tremor or shaking while you are resting that you cannot control.  · Stiffness in your neck, arms, and legs (rigidity).  · Slowing of movement. You may lose facial expressions and have trouble making small movements that are needed to button clothing or brush your teeth.  · An abnormal walk. You may walk with short, shuffling steps.  · Loss of balance and stability when standing. You may sway, fall backward, and have trouble making turns.  Other symptoms include:  · Mental or cognitive changes including depression, anxiety, having false beliefs (delusions), or seeing, hearing, or feeling things that do not exist  (hallucinations).  · Trouble speaking or swallowing.  · Changes in bowel or bladder functions including constipation, having to go urgently or frequently, or not being able to control your bowel or bladder.  · Changes in sleep habits or trouble sleeping.  Parkinson's disease may be graded by severity of your condition as mild, moderate, or advanced. Parkinson's disease progression is different for everyone. You may not progress to the advanced stage.  · Mild Parkinson's disease involves:  ? Movement problems that do not affect daily activities.  ? Movement problems on one side of the body.  · Moderate Parkinson's disease involves:  ? Movement problems on both sides of the body.  ? Slowing of movement.  ? Coordination and balance problems.  · Advanced Parkinson's disease involves:  ? Extreme difficulty walking.  ? Inability to live alone safely.  ? Signs of dementia, such as having trouble remembering things, doing daily tasks such as getting dressed, and problem solving.  How is this diagnosed?  This condition is diagnosed by a specialist. A diagnosis may be made based on symptoms, your medical history, and a physical exam. You may also have brain imaging tests to check for a loss of dopamine-producing areas of the brain.  How is this treated?  There is no cure for Parkinson's disease. Treatment focuses on managing your symptoms. Treatment may include:  · Medicines. Everyone responds to medicines differently. Your response may change over time. Work with your health care provider to find the best medicines for you.  · Speech, occupational, and physical therapy.  · Deep brain stimulation surgery to reduce tremors and other involuntary movements.  Follow these instructions at home:  Medicines  · Take over-the-counter and prescription medicines only as told by your health care provider.  · Avoid taking medicines that can affect thinking, such as pain or sleeping medicines.  Eating and drinking  · Follow instructions  from your health care provider about eating or drinking restrictions.  · Do not drink alcohol.  Activity  · Talk with your health care provider about if it is safe for you to drive.  · Do exercises as told by your health care provider or physical therapist.  Lifestyle         · Install grab bars and railings in your home to prevent falls.  · Do not use any products that contain nicotine or tobacco, such as cigarettes, e-cigarettes, and chewing tobacco. If you need help quitting, ask your health care provider.  · Consider joining a support group for people with Parkinson's disease.  General instructions  · Work with your health care provider to determine what you need help with and what your safety needs are.  · Keep all follow-up visits as told by your health care provider, including any visits with a physical therapist, speech therapist, or occupational therapist. This is important.  Contact a health care provider if:  · Medicines do not help your symptoms.  · You are unsteady or have fallen at home.  · You need more support to function well at home.  · You have trouble swallowing.  · You have severe constipation.  · You are having problems with side effects from your medicines.  · You feel confused, anxious, or depressed.  Get help right away if you:  · Are injured after a fall.  · See or hear things that are not real.  · Cannot swallow without choking.  · Have chest pain or trouble breathing.  · Do not feel safe at home.  · Have thoughts about hurting yourself or others.  If you ever feel like you may hurt yourself or others, or have thoughts about taking your own life, get help right away. You can go to your nearest emergency department or call:  · Your local emergency services (911 in the [place]  · A suicide crisis helpline, such as the National Suicide Prevention Lifeline at 1-965.363.2154. This is open 24 hours a day.  Summary  · Parkinson's disease is a long-term condition that gets worse over time. This  condition limits your ability to control your movements and move your body normally.  · There is no cure for Parkinson's disease. Treatment focuses on managing your symptoms.  · Work with your health care provider to determine what you need help with and what your safety needs are.  · Keep all follow-up visits as told by your health care provider, including any visits with a physical therapist, speech therapist, or occupational therapist. This is important.  This information is not intended to replace advice given to you by your health care provider. Make sure you discuss any questions you have with your health care provider.  Document Revised: 03/05/2020 Document Reviewed: 03/05/2020  Elsevier Patient Education © 2021 [employer]

## 2022-01-15 DIAGNOSIS — R11.0 NAUSEA: ICD-10-CM

## 2022-01-16 RX ORDER — ONDANSETRON 4 MG/1
4 TABLET, ORALLY DISINTEGRATING ORAL EVERY 8 HOURS PRN
Qty: 30 TABLET | Refills: 2 | Status: SHIPPED | OUTPATIENT
Start: 2022-01-16 | End: 2022-07-21

## 2022-01-17 NOTE — PRE-PROCEDURE INSTRUCTIONS
Pt. Instructed on NPO after midnight, pre-op meds. Ok to take all meds a.m. of procedure.       Fully vaccinated

## 2022-01-19 ENCOUNTER — ANESTHESIA EVENT (OUTPATIENT)
Dept: GASTROENTEROLOGY | Facility: HOSPITAL | Age: 67
End: 2022-01-19

## 2022-01-19 ENCOUNTER — ANESTHESIA (OUTPATIENT)
Dept: GASTROENTEROLOGY | Facility: HOSPITAL | Age: 67
End: 2022-01-19

## 2022-01-19 ENCOUNTER — HOSPITAL ENCOUNTER (OUTPATIENT)
Facility: HOSPITAL | Age: 67
Setting detail: HOSPITAL OUTPATIENT SURGERY
Discharge: HOME | End: 2022-01-19
Attending: INTERNAL MEDICINE | Admitting: INTERNAL MEDICINE

## 2022-01-19 VITALS
RESPIRATION RATE: 16 BRPM | WEIGHT: 112.88 LBS | TEMPERATURE: 98.3 F | DIASTOLIC BLOOD PRESSURE: 63 MMHG | SYSTOLIC BLOOD PRESSURE: 102 MMHG | BODY MASS INDEX: 24.43 KG/M2 | HEART RATE: 67 BPM | OXYGEN SATURATION: 95 %

## 2022-01-19 DIAGNOSIS — R63.0 LOSS OF APPETITE: ICD-10-CM

## 2022-01-19 DIAGNOSIS — G20 PARKINSON'S DISEASE: ICD-10-CM

## 2022-01-19 DIAGNOSIS — K21.9 GASTROESOPHAGEAL REFLUX DISEASE WITHOUT ESOPHAGITIS: ICD-10-CM

## 2022-01-19 DIAGNOSIS — R11.0 NAUSEA: ICD-10-CM

## 2022-01-19 DIAGNOSIS — R11.2 NAUSEA AND VOMITING, INTRACTABILITY OF VOMITING NOT SPECIFIED, UNSPECIFIED VOMITING TYPE: ICD-10-CM

## 2022-01-19 PROCEDURE — 43239 EGD BIOPSY SINGLE/MULTIPLE: CPT | Performed by: INTERNAL MEDICINE

## 2022-01-19 PROCEDURE — 25010000002 PROPOFOL 10 MG/ML EMULSION: Performed by: NURSE ANESTHETIST, CERTIFIED REGISTERED

## 2022-01-19 PROCEDURE — 88305 TISSUE EXAM BY PATHOLOGIST: CPT | Performed by: INTERNAL MEDICINE

## 2022-01-19 RX ORDER — PROPOFOL 10 MG/ML
VIAL (ML) INTRAVENOUS AS NEEDED
Status: DISCONTINUED | OUTPATIENT
Start: 2022-01-19 | End: 2022-01-19 | Stop reason: SURG

## 2022-01-19 RX ORDER — SODIUM CHLORIDE, SODIUM LACTATE, POTASSIUM CHLORIDE, CALCIUM CHLORIDE 600; 310; 30; 20 MG/100ML; MG/100ML; MG/100ML; MG/100ML
30 INJECTION, SOLUTION INTRAVENOUS CONTINUOUS
Status: DISCONTINUED | OUTPATIENT
Start: 2022-01-19 | End: 2022-01-19 | Stop reason: HOSPADM

## 2022-01-19 RX ORDER — LIDOCAINE HYDROCHLORIDE 20 MG/ML
INJECTION, SOLUTION INFILTRATION; PERINEURAL AS NEEDED
Status: DISCONTINUED | OUTPATIENT
Start: 2022-01-19 | End: 2022-01-19 | Stop reason: SURG

## 2022-01-19 RX ADMIN — LIDOCAINE HYDROCHLORIDE 30 MG: 20 INJECTION, SOLUTION INFILTRATION; PERINEURAL at 10:50

## 2022-01-19 RX ADMIN — PROPOFOL 30 MG: 10 INJECTION, EMULSION INTRAVENOUS at 10:50

## 2022-01-19 RX ADMIN — SODIUM CHLORIDE, POTASSIUM CHLORIDE, SODIUM LACTATE AND CALCIUM CHLORIDE 30 ML/HR: 600; 310; 30; 20 INJECTION, SOLUTION INTRAVENOUS at 10:28

## 2022-01-19 RX ADMIN — PROPOFOL 333 MCG/KG/MIN: 10 INJECTION, EMULSION INTRAVENOUS at 10:48

## 2022-01-19 NOTE — ANESTHESIA PREPROCEDURE EVALUATION
Anesthesia Evaluation     Patient summary reviewed and Nursing notes reviewed   no history of anesthetic complications:  NPO Solid Status: > 8 hours  NPO Liquid Status: > 2 hours           Airway   Mallampati: II  TM distance: >3 FB  No difficulty expected  Dental          Pulmonary - normal exam   (+) a smoker Current,   Cardiovascular - normal exam  Exercise tolerance: good (4-7 METS)    (+) hyperlipidemia,       Neuro/Psych  (+) numbness,       ROS Comment: Parkinson's   GI/Hepatic/Renal/Endo    (+)  GERD,      Musculoskeletal     (+) back pain, radiculopathy  Abdominal  - normal exam   Substance History - negative use     OB/GYN negative ob/gyn ROS         Other   arthritis,                      Anesthesia Plan    ASA 2     general   (Total IV Anesthesia    )  intravenous induction     Anesthetic plan, all risks, benefits, and alternatives have been provided, discussed and informed consent has been obtained with: patient.    Plan discussed with CRNA.        CODE STATUS:

## 2022-01-19 NOTE — H&P
Pre Procedure History & Physical    Chief Complaint:   gerd  nausea    Subjective     HPI:   gerd  nausea    Past Medical History:   Past Medical History:   Diagnosis Date   • Arthritis    • Cataract    • CTS (carpal tunnel syndrome) 20010   • GERD (gastroesophageal reflux disease)    • Hyperlipidemia    • Parkinson's disease (HCC)    • Sciatica    • Thoracic or lumbosacral neuritis or radiculitis        Past Surgical History:  Past Surgical History:   Procedure Laterality Date   • BACK SURGERY      LOW BACK DISC   • CARPAL TUNNEL RELEASE     • CATARACT EXTRACTION     • COLONOSCOPY     • ENDOSCOPY     • FOOT SURGERY     • HYSTERECTOMY      DUE TO CANCER   • SHOULDER SURGERY     • TUBAL ABDOMINAL LIGATION     • UPPER GASTROINTESTINAL ENDOSCOPY         Family History:  Family History   Problem Relation Age of Onset   • Depression Mother    • Hyperlipidemia Mother    • Hypertension Mother    • Alcohol abuse Father    • Depression Sister    • Hyperlipidemia Sister    • Hypertension Sister    • Osteoporosis Sister    • Depression Brother    • Hypertension Brother    • Alcohol abuse Brother    • Alcohol abuse Maternal Grandfather    • Alcohol abuse Paternal Grandfather    • Colon cancer Paternal Uncle    • Malig Hyperthermia Neg Hx        Social History:   reports that she has been smoking cigarettes. She has been smoking about 0.00 packs per day for the past 20.00 years. She has never used smokeless tobacco. She reports that she does not drink alcohol and does not use drugs.    Medications:   Medications Prior to Admission   Medication Sig Dispense Refill Last Dose   • carbidopa-levodopa (SINEMET)  MG per tablet Take 1 tablet by mouth 4 (Four) Times a Day. 1 tablet  tablet 3    • omeprazole (priLOSEC) 40 MG capsule Take 1 capsule by mouth Daily. (Patient taking differently: Take 40 mg by mouth Daily As Needed.) 30 capsule 4    • ondansetron ODT (ZOFRAN-ODT) 4 MG disintegrating tablet Place 1 tablet on the  tongue Every 8 (Eight) Hours As Needed for Nausea or Vomiting. 30 tablet 2    • sucralfate (Carafate) 1 g tablet Take 1 tablet by mouth 4 (Four) Times a Day. 120 tablet 2        Allergies:  Patient has no known allergies.        Objective     Blood pressure 133/70, pulse 63, temperature 97.2 °F (36.2 °C), resp. rate 20, weight 51.2 kg (112 lb 14 oz), SpO2 98 %.    Physical Exam   Constitutional: Pt is oriented to person, place, and time and well-developed, well-nourished, and in no distress.   Mouth/Throat: Oropharynx is clear and moist.   Neck: Normal range of motion.   Cardiovascular: Normal rate, regular rhythm and normal heart sounds.    Pulmonary/Chest: Effort normal and breath sounds normal.   Abdominal: Soft. Nontender  Skin: Skin is warm and dry.   Psychiatric: Mood, memory, affect and judgment normal.     Assessment/Plan     Diagnosis:  gerd  nausea    Anticipated Surgical Procedure:  egd    The risks, benefits, and alternatives of this procedure have been discussed with the patient or the responsible party- the patient understands and agrees to proceed.

## 2022-01-19 NOTE — ANESTHESIA POSTPROCEDURE EVALUATION
Patient: Faith Alegria    Procedure Summary     Date: 01/19/22 Room / Location: [facility] ENDOSCOPY 2 / [facility] ENDOSCOPY    Anesthesia Start: 1047 Anesthesia Stop: 1105    Procedure: ESOPHAGOGASTRODUODENOSCOPY WITH BIOPSIES (N/A ) Diagnosis:       Gastroesophageal reflux disease without esophagitis      Nausea      Parkinson's disease (HCC)      Nausea and vomiting, intractability of vomiting not specified, unspecified vomiting type      Loss of appetite      (Gastroesophageal reflux disease without esophagitis [K21.9])      (Nausea [R11.0])      (Parkinson's disease (HCC) [G20])      (Nausea and vomiting, intractability of vomiting not specified, unspecified vomiting type [R11.2])      (Loss of appetite [R63.0])    Surgeons: Luis Enrique Valenzuela MD Provider: Yolanda Pruett DO    Anesthesia Type: general ASA Status: 2          Anesthesia Type: general    Vitals  Vitals Value Taken Time   /63 01/19/22 1120   Temp 36.8 °C (98.3 °F) 01/19/22 1120   Pulse 67 01/19/22 1121   Resp 16 01/19/22 1120   SpO2 95 % 01/19/22 1121   Vitals shown include unvalidated device data.        Post Anesthesia Care and Evaluation    Patient location during evaluation: bedside  Patient participation: complete - patient participated  Level of consciousness: awake  Pain management: adequate  Airway patency: patent  Anesthetic complications: No anesthetic complications  PONV Status: none  Cardiovascular status: acceptable and stable  Respiratory status: acceptable  Hydration status: acceptable    Comments: An Anesthesiologist personally participated in the most demanding procedures (including induction and emergence if applicable) in the anesthesia plan, monitored the course of anesthesia administration at frequent intervals and remained physically present and available for immediate diagnosis and treatment of emergencies.

## 2022-01-20 ENCOUNTER — TELEPHONE (OUTPATIENT)
Dept: GASTROENTEROLOGY | Facility: CLINIC | Age: 67
End: 2022-01-20

## 2022-01-20 DIAGNOSIS — A04.8 H. PYLORI INFECTION: Primary | ICD-10-CM

## 2022-01-20 LAB
CYTO UR: NORMAL
LAB AP CASE REPORT: NORMAL
LAB AP CLINICAL INFORMATION: NORMAL
PATH REPORT.FINAL DX SPEC: NORMAL
PATH REPORT.GROSS SPEC: NORMAL

## 2022-01-20 RX ORDER — BISMUTH SUBCITRATE POTASSIUM, METRONIDAZOLE, TETRACYCLINE HYDROCHLORIDE 140; 125; 125 MG/1; MG/1; MG/1
3 CAPSULE ORAL
Qty: 120 CAPSULE | Refills: 0 | Status: SHIPPED | OUTPATIENT
Start: 2022-01-20 | End: 2022-01-30

## 2022-01-20 NOTE — TELEPHONE ENCOUNTER
Spoke to pt and informed of Kelsi CROWDER note. Educated pt on the importance of taking medication as directed and finishing full coarse and when to retest as directed per Kelsi CROWDER note. F/U scheduled for 04/08/2022 @ 6660. Pt verbalized understanding. Apt reminder mailed to pt.

## 2022-01-20 NOTE — TELEPHONE ENCOUNTER
----- Message from Kelsi Eaton NP sent at 1/20/2022 10:54 AM EST -----  Please call the patient and inform that they are positive for H. pylori.  H. pylori is overgrowth of bacteria within the stomach.  We treat this infection with antibiotics and antacids.  Educated the patient that I have called in medication to the pharmacy to treat H. pylori.  We will repeat a H. pylori breath test in 8 weeks.  Educated the patient that the breath test will need to be performed once the medication is completed and the patient will need to be off antacids for 2 weeks prior to the exam.  The breath test has been ordered and the patient can get the test performed at the hospital.  Patient cannot have anything to eat or drink at least 4 to 6 hours prior to the test.

## 2022-01-21 ENCOUNTER — TELEPHONE (OUTPATIENT)
Dept: GASTROENTEROLOGY | Facility: CLINIC | Age: 67
End: 2022-01-21

## 2022-01-21 NOTE — TELEPHONE ENCOUNTER
----- Message from Amparo Loomis sent at 1/21/2022  8:13 AM EST -----    ----- Message -----  From: Kelsi Eaton NP  Sent: 1/20/2022  10:54 AM EST  To: Rosalba Harmon RN, #    Please call the patient and inform that they are positive for H. pylori.  H. pylori is overgrowth of bacteria within the stomach.  We treat this infection with antibiotics and antacids.  Educated the patient that I have called in medication to the pharmacy to treat H. pylori.  We will repeat a H. pylori breath test in 8 weeks.  Educated the patient that the breath test will need to be performed once the medication is completed and the patient will need to be off antacids for 2 weeks prior to the exam.  The breath test has been ordered and the patient can get the test performed at the hospital.  Patient cannot have anything to eat or drink at least 4 to 6 hours prior to the test.

## 2022-01-25 ENCOUNTER — OFFICE VISIT (OUTPATIENT)
Dept: FAMILY MEDICINE CLINIC | Facility: CLINIC | Age: 67
End: 2022-01-25

## 2022-01-25 VITALS
OXYGEN SATURATION: 94 % | DIASTOLIC BLOOD PRESSURE: 78 MMHG | TEMPERATURE: 96.8 F | SYSTOLIC BLOOD PRESSURE: 122 MMHG | BODY MASS INDEX: 24.59 KG/M2 | WEIGHT: 114 LBS | HEART RATE: 72 BPM | HEIGHT: 57 IN

## 2022-01-25 DIAGNOSIS — M85.80 OSTEOPENIA, UNSPECIFIED LOCATION: ICD-10-CM

## 2022-01-25 DIAGNOSIS — Z12.31 SCREENING MAMMOGRAM FOR BREAST CANCER: ICD-10-CM

## 2022-01-25 DIAGNOSIS — M89.8X9 BONE PAIN: ICD-10-CM

## 2022-01-25 DIAGNOSIS — Z86.39 HISTORY OF IRON DEFICIENCY: ICD-10-CM

## 2022-01-25 DIAGNOSIS — R63.4 WEIGHT LOSS: ICD-10-CM

## 2022-01-25 DIAGNOSIS — R23.3 EASY BRUISABILITY: ICD-10-CM

## 2022-01-25 DIAGNOSIS — R53.83 FATIGUE, UNSPECIFIED TYPE: Primary | ICD-10-CM

## 2022-01-25 DIAGNOSIS — R63.0 LOSS OF APPETITE: ICD-10-CM

## 2022-01-25 DIAGNOSIS — E78.2 MIXED HYPERLIPIDEMIA: ICD-10-CM

## 2022-01-25 DIAGNOSIS — Z78.0 POSTMENOPAUSAL: ICD-10-CM

## 2022-01-25 LAB
ALBUMIN SERPL-MCNC: 4.4 G/DL (ref 3.5–5.2)
ALBUMIN/GLOB SERPL: 1.8 G/DL
ALP SERPL-CCNC: 105 U/L (ref 39–117)
ALT SERPL W P-5'-P-CCNC: 11 U/L (ref 1–33)
ANION GAP SERPL CALCULATED.3IONS-SCNC: 7.9 MMOL/L (ref 5–15)
AST SERPL-CCNC: 17 U/L (ref 1–32)
BASOPHILS # BLD AUTO: 0.04 10*3/MM3 (ref 0–0.2)
BASOPHILS NFR BLD AUTO: 0.6 % (ref 0–1.5)
BILIRUB SERPL-MCNC: 0.2 MG/DL (ref 0–1.2)
BUN SERPL-MCNC: 11 MG/DL (ref 8–23)
BUN/CREAT SERPL: 15.1 (ref 7–25)
CALCIUM SPEC-SCNC: 10.2 MG/DL (ref 8.6–10.5)
CHLORIDE SERPL-SCNC: 105 MMOL/L (ref 98–107)
CHOLEST SERPL-MCNC: 193 MG/DL (ref 0–200)
CO2 SERPL-SCNC: 28.1 MMOL/L (ref 22–29)
CREAT SERPL-MCNC: 0.73 MG/DL (ref 0.57–1)
DEPRECATED RDW RBC AUTO: 42.5 FL (ref 37–54)
EOSINOPHIL # BLD AUTO: 0.07 10*3/MM3 (ref 0–0.4)
EOSINOPHIL NFR BLD AUTO: 1 % (ref 0.3–6.2)
ERYTHROCYTE [DISTWIDTH] IN BLOOD BY AUTOMATED COUNT: 12.3 % (ref 12.3–15.4)
FERRITIN SERPL-MCNC: 68.8 NG/ML (ref 13–150)
FOLATE SERPL-MCNC: 9.93 NG/ML (ref 4.78–24.2)
GFR SERPL CREATININE-BSD FRML MDRD: 80 ML/MIN/1.73
GLOBULIN UR ELPH-MCNC: 2.4 GM/DL
GLUCOSE SERPL-MCNC: 89 MG/DL (ref 65–99)
HAV IGM SERPL QL IA: NORMAL
HBV CORE IGM SERPL QL IA: NORMAL
HBV SURFACE AG SERPL QL IA: NORMAL
HCT VFR BLD AUTO: 45.6 % (ref 34–46.6)
HCV AB SER DONR QL: NORMAL
HDLC SERPL-MCNC: 49 MG/DL (ref 40–60)
HGB BLD-MCNC: 15 G/DL (ref 12–15.9)
IMM GRANULOCYTES # BLD AUTO: 0.02 10*3/MM3 (ref 0–0.05)
IMM GRANULOCYTES NFR BLD AUTO: 0.3 % (ref 0–0.5)
LDLC SERPL CALC-MCNC: 119 MG/DL (ref 0–100)
LDLC/HDLC SERPL: 2.36 {RATIO}
LYMPHOCYTES # BLD AUTO: 1.62 10*3/MM3 (ref 0.7–3.1)
LYMPHOCYTES NFR BLD AUTO: 22.4 % (ref 19.6–45.3)
MCH RBC QN AUTO: 30.9 PG (ref 26.6–33)
MCHC RBC AUTO-ENTMCNC: 32.9 G/DL (ref 31.5–35.7)
MCV RBC AUTO: 94 FL (ref 79–97)
MONOCYTES # BLD AUTO: 0.47 10*3/MM3 (ref 0.1–0.9)
MONOCYTES NFR BLD AUTO: 6.5 % (ref 5–12)
NEUTROPHILS NFR BLD AUTO: 5.01 10*3/MM3 (ref 1.7–7)
NEUTROPHILS NFR BLD AUTO: 69.2 % (ref 42.7–76)
NRBC BLD AUTO-RTO: 0 /100 WBC (ref 0–0.2)
PLATELET # BLD AUTO: 291 10*3/MM3 (ref 140–450)
PMV BLD AUTO: 10.4 FL (ref 6–12)
POTASSIUM SERPL-SCNC: 4.4 MMOL/L (ref 3.5–5.2)
PROT SERPL-MCNC: 6.8 G/DL (ref 6–8.5)
RBC # BLD AUTO: 4.85 10*6/MM3 (ref 3.77–5.28)
SODIUM SERPL-SCNC: 141 MMOL/L (ref 136–145)
TRIGL SERPL-MCNC: 141 MG/DL (ref 0–150)
TSH SERPL DL<=0.05 MIU/L-ACNC: 1.38 UIU/ML (ref 0.27–4.2)
VIT B12 BLD-MCNC: 554 PG/ML (ref 211–946)
VLDLC SERPL-MCNC: 25 MG/DL (ref 5–40)
WBC NRBC COR # BLD: 7.23 10*3/MM3 (ref 3.4–10.8)

## 2022-01-25 PROCEDURE — 80061 LIPID PANEL: CPT | Performed by: NURSE PRACTITIONER

## 2022-01-25 PROCEDURE — 82746 ASSAY OF FOLIC ACID SERUM: CPT | Performed by: NURSE PRACTITIONER

## 2022-01-25 PROCEDURE — 86665 EPSTEIN-BARR CAPSID VCA: CPT | Performed by: NURSE PRACTITIONER

## 2022-01-25 PROCEDURE — 36415 COLL VENOUS BLD VENIPUNCTURE: CPT | Performed by: NURSE PRACTITIONER

## 2022-01-25 PROCEDURE — 82607 VITAMIN B-12: CPT | Performed by: NURSE PRACTITIONER

## 2022-01-25 PROCEDURE — 84466 ASSAY OF TRANSFERRIN: CPT | Performed by: NURSE PRACTITIONER

## 2022-01-25 PROCEDURE — 86663 EPSTEIN-BARR ANTIBODY: CPT | Performed by: NURSE PRACTITIONER

## 2022-01-25 PROCEDURE — 83540 ASSAY OF IRON: CPT | Performed by: NURSE PRACTITIONER

## 2022-01-25 PROCEDURE — 85025 COMPLETE CBC W/AUTO DIFF WBC: CPT | Performed by: NURSE PRACTITIONER

## 2022-01-25 PROCEDURE — 86664 EPSTEIN-BARR NUCLEAR ANTIGEN: CPT | Performed by: NURSE PRACTITIONER

## 2022-01-25 PROCEDURE — 80074 ACUTE HEPATITIS PANEL: CPT | Performed by: NURSE PRACTITIONER

## 2022-01-25 PROCEDURE — 99214 OFFICE O/P EST MOD 30 MIN: CPT | Performed by: NURSE PRACTITIONER

## 2022-01-25 PROCEDURE — 80053 COMPREHEN METABOLIC PANEL: CPT | Performed by: NURSE PRACTITIONER

## 2022-01-25 PROCEDURE — 84443 ASSAY THYROID STIM HORMONE: CPT | Performed by: NURSE PRACTITIONER

## 2022-01-25 PROCEDURE — 82530 CORTISOL FREE: CPT | Performed by: NURSE PRACTITIONER

## 2022-01-25 PROCEDURE — 82728 ASSAY OF FERRITIN: CPT | Performed by: NURSE PRACTITIONER

## 2022-01-25 NOTE — PROGRESS NOTES
"Answers for HPI/ROS submitted by the patient on 1/23/2022  Please describe your symptoms.: Weight loss  Have you had these symptoms before?: No  How long have you been having these symptoms?: Greater than 2 weeks  Please list any medications you are currently taking for this condition.: None  Please describe any probable cause for these symptoms. : Don’t know  What is the primary reason for your visit?: Other    Chief Complaint  Biometric Screening (having weight loss)    Subjective            Faith ORTIZ Juan Alegria presents to [facility]  Pt reports experiencing wt loss without trying--but is having decreased appetite--started approx 1 yr ago (started Dec 2020)     Then pt reports having had EGD done recently they are now treating her for H pylori infection--then her last c-scope 3/2020    Also reports was Dx'd with parkinson's and seeing neurologist an they started the sinemet and pt cannot tolerate this med Tid or the QID they wanted to increase her to as it makes her very sleepy and (knocks me out\" and nauseated--then the pt also reports still having the constipation and they re scheduling her for an emptying study to make sure not any gastroparesis    Pt reports had a good day yesterday and was more energetic and more appetitive     Then pt reports only thing she took today was for the H pylori--not the parkinson's Dz     ______________________________________________    In the past pt had a partial hysterectomy no related to cancer-         PHQ-2 Total Score:    PHQ-9 Total Score:      Past Medical History:   Diagnosis Date   • Arthritis    • Cataract    • CTS (carpal tunnel syndrome) 20010   • GERD (gastroesophageal reflux disease)    • Hyperlipidemia    • Parkinson's disease (HCC)    • Sciatica    • Thoracic or lumbosacral neuritis or radiculitis        No Known Allergies     Past Surgical History:   Procedure Laterality Date   • BACK SURGERY      LOW BACK DISC   • CARPAL " TUNNEL RELEASE     • CATARACT EXTRACTION     • COLONOSCOPY     • ENDOSCOPY     • ENDOSCOPY N/A 1/19/2022    Procedure: ESOPHAGOGASTRODUODENOSCOPY WITH BIOPSIES;  Surgeon: Luis Enrique Valenzuela MD;  Location: [facility] ENDOSCOPY;  Service: Gastroenterology;  Laterality: N/A;  HIATAL HERNIA   • FOOT SURGERY     • HYSTERECTOMY      DUE TO CANCER   • SHOULDER SURGERY     • TUBAL ABDOMINAL LIGATION     • UPPER GASTROINTESTINAL ENDOSCOPY          Social History     Tobacco Use   • Smoking status: Current Every Day Smoker     Packs/day: 0.00     Years: 20.00     Pack years: 0.00     Types: Cigarettes   • Smokeless tobacco: Never Used   • Tobacco comment: 2 cigarettes per day   Vaping Use   • Vaping Use: Never used   Substance Use Topics   • Alcohol use: Never   • Drug use: Never       Family History   Problem Relation Age of Onset   • Depression Mother    • Hyperlipidemia Mother    • Hypertension Mother    • Alcohol abuse Father    • Depression Sister    • Hyperlipidemia Sister    • Hypertension Sister    • Osteoporosis Sister    • Depression Brother    • Hypertension Brother    • Alcohol abuse Brother    • Alcohol abuse Maternal Grandfather    • Alcohol abuse Paternal Grandfather    • Colon cancer Paternal Uncle    • Malig Hyperthermia Neg Hx         Health Maintenance Due   Topic Date Due   • ZOSTER VACCINE (1 of 2) Never done   • DXA SCAN  07/16/2021        Current Outpatient Medications on File Prior to Visit   Medication Sig   • bismuth-metronidazole-tetracycline (Pylera) 140-125-125 MG per capsule Take 3 capsules by mouth 4 (Four) Times a Day Before Meals & at Bedtime for 10 days.   • carbidopa-levodopa (SINEMET)  MG per tablet Take 1 tablet by mouth 4 (Four) Times a Day. 1 tablet QID   • omeprazole (priLOSEC) 40 MG capsule Take 1 capsule by mouth Daily. (Patient taking differently: Take 40 mg by mouth Daily As Needed.)   • ondansetron ODT (ZOFRAN-ODT) 4 MG disintegrating tablet Place 1 tablet on the tongue Every  "8 (Eight) Hours As Needed for Nausea or Vomiting.   • [DISCONTINUED] sucralfate (Carafate) 1 g tablet Take 1 tablet by mouth 4 (Four) Times a Day.     No current facility-administered medications on file prior to visit.       Immunization History   Administered Date(s) Administered   • COVID-19 (PFIZER) PURPLE CAP 04/24/2021, 05/15/2021, 11/23/2021   • Fluzone High-Dose 65+yrs 10/25/2021   • Influenza, Unspecified 10/15/2020   • Pneumococcal Polysaccharide (PPSV23) 07/23/2019   • Tdap 07/19/2017       Review of Systems   Constitutional: Positive for activity change, appetite change, fatigue and unexpected weight loss.   HENT: Negative.    Eyes: Negative.    Respiratory: Negative.  Negative for shortness of breath.    Cardiovascular: Negative.  Negative for chest pain.   Gastrointestinal: Positive for constipation and nausea. Negative for abdominal pain and blood in stool.   Endocrine: Negative.    Genitourinary: Negative.  Negative for breast discharge, breast lump, breast pain, dysuria and hematuria.   Musculoskeletal: Positive for arthralgias, back pain and neck pain.        Bone pain and achyness and this is an ongoing thing   Skin: Negative.    Allergic/Immunologic: Negative.    Neurological: Positive for tremors and light-headedness. Negative for dizziness, seizures and syncope.        With the parkinsons per pt report    Hematological: Negative for adenopathy. Bruises/bleeds easily.   Psychiatric/Behavioral: Negative for self-injury, suicidal ideas and depressed mood. The patient is not nervous/anxious.         Objective     /78 (BP Location: Left arm, Patient Position: Sitting, Cuff Size: Adult)   Pulse 72   Temp 96.8 °F (36 °C) (Temporal)   Ht 144.8 cm (57\")   Wt 51.7 kg (114 lb)   SpO2 94%   BMI 24.67 kg/m²       Physical Exam  Vitals and nursing note reviewed.   Constitutional:       Appearance: Normal appearance.   HENT:      Head: Normocephalic.      Right Ear: Tympanic membrane normal.      " Left Ear: Tympanic membrane normal.      Nose: Nose normal.      Mouth/Throat:      Mouth: Mucous membranes are moist.   Eyes:      Pupils: Pupils are equal, round, and reactive to light.   Cardiovascular:      Rate and Rhythm: Normal rate and regular rhythm.      Pulses:           Carotid pulses are 2+ on the right side and 2+ on the left side.     Heart sounds: Normal heart sounds. No murmur heard.      Pulmonary:      Effort: Pulmonary effort is normal.      Breath sounds: Normal breath sounds.   Abdominal:      General: Bowel sounds are normal.      Palpations: Abdomen is soft.   Musculoskeletal:         General: Normal range of motion.      Cervical back: Normal range of motion and neck supple.      Right lower leg: No edema.      Left lower leg: No edema.   Skin:     General: Skin is warm and dry.   Neurological:      Mental Status: She is alert and oriented to person, place, and time.   Psychiatric:         Mood and Affect: Mood normal.         Behavior: Behavior normal.         Judgment: Judgment normal.         Result Review :             Office Visit with Kortney Vilchis APRN (01/10/2022)  Admission (Discharged) with Luis Enrique Valenzuela MD (01/19/2022)  Surgery with Luis Enrique Valenzuela MD (01/19/2022)  SCANNED - COLONOSCOPY (03/02/2020)  UPPER GI ENDOSCOPY (01/19/2022 10:42)  Mammo Screening Bilateral With CAD (08/24/2021 11:08)               Assessment and Plan      Diagnoses and all orders for this visit:    1. Fatigue, unspecified type (Primary)  -     Vitamin B12 & Folate  -     TSH Rfx On Abnormal To Free T4  -     CBC Auto Differential  -     Comprehensive Metabolic Panel  -     Cortisol, Free  -     Hepatitis Panel, Acute  -     Ferritin  -     Iron Profile  -     Thompson-Barr Virus VCA Antibody Panel    2. Weight loss  -     Vitamin B12 & Folate  -     TSH Rfx On Abnormal To Free T4  -     CBC Auto Differential  -     Comprehensive Metabolic Panel  -     Cortisol, Free  -     Hepatitis  Panel, Acute  -     Ferritin  -     Iron Profile    3. Loss of appetite  -     Vitamin B12 & Folate  -     TSH Rfx On Abnormal To Free T4  -     CBC Auto Differential  -     Comprehensive Metabolic Panel  -     Cortisol, Free  -     Hepatitis Panel, Acute    4. Bone pain  -     Vitamin B12 & Folate  -     TSH Rfx On Abnormal To Free T4  -     CBC Auto Differential  -     Comprehensive Metabolic Panel  -     Cortisol, Free  -     Hepatitis Panel, Acute  -     DEXA Bone Density Axial    5. Screening mammogram for breast cancer  -     Mammo Screening Bilateral With CAD; Future    6. History of iron deficiency  -     CBC Auto Differential  -     Ferritin  -     Iron Profile    7. Easy bruisability  -     Vitamin B12 & Folate  -     TSH Rfx On Abnormal To Free T4  -     CBC Auto Differential  -     Comprehensive Metabolic Panel  -     Cortisol, Free  -     Hepatitis Panel, Acute  -     Ferritin  -     Iron Profile    8. Osteopenia, unspecified location  -     DEXA Bone Density Axial    9. Postmenopausal  -     DEXA Bone Density Axial    10. Mixed hyperlipidemia  -     Lipid Panel            Follow Up     Return if symptoms worsen or fail to improve.

## 2022-01-25 NOTE — PROGRESS NOTES
Venipuncture Blood Specimen Collection  Venipuncture performed in left arm  by Karmen Dooley with good hemostasis. Patient tolerated the procedure well without complications.   01/25/22   Karmen Dooley

## 2022-01-26 LAB
EBV EA IGG SER-ACNC: 40.2 U/ML (ref 0–8.9)
EBV NA IGG SER IA-ACNC: >600 U/ML (ref 0–17.9)
EBV VCA IGG SER IA-ACNC: 406 U/ML (ref 0–17.9)
EBV VCA IGM SER IA-ACNC: <36 U/ML (ref 0–35.9)
IRON 24H UR-MRATE: 84 MCG/DL (ref 37–145)
IRON SATN MFR SERPL: 25 % (ref 20–50)
TIBC SERPL-MCNC: 340 MCG/DL (ref 298–536)
TRANSFERRIN SERPL-MCNC: 228 MG/DL (ref 200–360)

## 2022-01-26 NOTE — PROGRESS NOTES
Please mail letter to patient stating    Faith, all of the Thompson-Barr virus panel labs were positive except the VCA, IgM was negative so since all of the others were positive/reactive-- this means you may have a possible reactivation of Thompson-Barr virus panel or acute infection-- which would explain the fatigue--normally it is self-limiting and after 4 to 6 weeks fatigue lifts and energy is restored

## 2022-01-26 NOTE — PROGRESS NOTES
Please mail letter to patient stating    Faith, your iron panel was normal and your B12 folic acid levels were normal; the hepatitis screening was all negative; the comprehensive panel was completely normal showing normal glucose and normal electrolytes and normal kidney and liver function tests; your thyroid levels were normal range and your ferritin was also normal range and your blood counts was completely normal; your cholesterol levels are the lowest and the best they have been in greater than 2 years with a normal total cholesterol normal triglyceride level normal HDL level and a slightly elevated LDL at 119 which is the lowest its been in the normal range is less than 100 when fasting;     And the only thing that is still pending is the cortisol level and the Thompson-Castorena panel I will let you know on those

## 2022-01-31 ENCOUNTER — TELEPHONE (OUTPATIENT)
Dept: GASTROENTEROLOGY | Facility: CLINIC | Age: 67
End: 2022-01-31

## 2022-01-31 LAB — CORTIS F SERPL-MCNC: 0.64 UG/DL

## 2022-01-31 RX ORDER — FLUCONAZOLE 150 MG/1
150 TABLET ORAL ONCE
Qty: 2 TABLET | Refills: 0 | Status: SHIPPED | OUTPATIENT
Start: 2022-01-31 | End: 2022-01-31

## 2022-01-31 NOTE — TELEPHONE ENCOUNTER
"Ms Juan Alegria called stating the Pylera has given her a yeast infection.  Also having dark/tarry stools, and her \"urine looks like rust\"...    Per Kelsi Eaton NP, ok to send in Diflucan 150mg one now and one in 3 days #2. The dark tarry stools are due to the pepto bismol in the pylera..  She needs to contact her PCP for her urine. Make sure pt is hydrating well. I notified pt of this. Voiced understanding. She will call her pcp. Dariel  "

## 2022-02-01 ENCOUNTER — OFFICE VISIT (OUTPATIENT)
Dept: FAMILY MEDICINE CLINIC | Facility: CLINIC | Age: 67
End: 2022-02-01

## 2022-02-01 VITALS
HEART RATE: 74 BPM | BODY MASS INDEX: 24.02 KG/M2 | OXYGEN SATURATION: 98 % | SYSTOLIC BLOOD PRESSURE: 148 MMHG | WEIGHT: 111 LBS | TEMPERATURE: 97 F | DIASTOLIC BLOOD PRESSURE: 94 MMHG

## 2022-02-01 DIAGNOSIS — R30.0 DYSURIA: Primary | ICD-10-CM

## 2022-02-01 LAB
BILIRUB BLD-MCNC: ABNORMAL MG/DL
CLARITY, POC: CLEAR
COLOR UR: ABNORMAL
EXPIRATION DATE: ABNORMAL
GLUCOSE UR STRIP-MCNC: ABNORMAL MG/DL
KETONES UR QL: ABNORMAL
LEUKOCYTE EST, POC: ABNORMAL
Lab: ABNORMAL
NITRITE UR-MCNC: POSITIVE MG/ML
PH UR: 5 [PH] (ref 5–8)
PROT UR STRIP-MCNC: ABNORMAL MG/DL
RBC # UR STRIP: ABNORMAL /UL
SP GR UR: 1.02 (ref 1–1.03)
UROBILINOGEN UR QL: NORMAL

## 2022-02-01 PROCEDURE — 99213 OFFICE O/P EST LOW 20 MIN: CPT | Performed by: FAMILY MEDICINE

## 2022-02-01 PROCEDURE — 81003 URINALYSIS AUTO W/O SCOPE: CPT | Performed by: FAMILY MEDICINE

## 2022-02-01 PROCEDURE — 87086 URINE CULTURE/COLONY COUNT: CPT | Performed by: FAMILY MEDICINE

## 2022-02-01 RX ORDER — NITROFURANTOIN 25; 75 MG/1; MG/1
100 CAPSULE ORAL 2 TIMES DAILY
Qty: 14 CAPSULE | Refills: 0 | Status: SHIPPED | OUTPATIENT
Start: 2022-02-01 | End: 2022-02-08

## 2022-02-02 LAB — BACTERIA SPEC AEROBE CULT: NO GROWTH

## 2022-02-09 ENCOUNTER — HOSPITAL ENCOUNTER (OUTPATIENT)
Dept: NUCLEAR MEDICINE | Facility: HOSPITAL | Age: 67
Discharge: HOME | End: 2022-02-09
Admitting: INTERNAL MEDICINE

## 2022-02-09 DIAGNOSIS — R63.0 LOSS OF APPETITE: ICD-10-CM

## 2022-02-09 DIAGNOSIS — R11.0 NAUSEA: ICD-10-CM

## 2022-02-09 PROCEDURE — 78264 GASTRIC EMPTYING IMG STUDY: CPT

## 2022-02-09 PROCEDURE — 0 TECHNETIUM SULFUR COLLOID: Performed by: INTERNAL MEDICINE

## 2022-02-09 PROCEDURE — A9541 TC99M SULFUR COLLOID: HCPCS | Performed by: INTERNAL MEDICINE

## 2022-02-09 RX ADMIN — TECHNETIUM TC 99M SULFUR COLLOID 1 DOSE: KIT at 08:10

## 2022-03-18 ENCOUNTER — TELEPHONE (OUTPATIENT)
Dept: GASTROENTEROLOGY | Facility: CLINIC | Age: 67
End: 2022-03-18

## 2022-03-18 ENCOUNTER — LAB (OUTPATIENT)
Dept: LAB | Facility: HOSPITAL | Age: 67
End: 2022-03-18

## 2022-03-18 DIAGNOSIS — A04.8 H. PYLORI INFECTION: ICD-10-CM

## 2022-03-18 LAB — UREA BREATH TEST QL: NEGATIVE

## 2022-03-18 PROCEDURE — 83013 H PYLORI (C-13) BREATH: CPT

## 2022-04-06 NOTE — PROGRESS NOTES
Chief Complaint   GERD  H-pylori     History of Present Illness       Faith Alegria is a 66 y.o. female who presents to [facility] for follow-up history of diverticulosis, colon polyps, gastritis, H. pylori, nausea, vomiting, poor appetite and Parkinson's.  We reviewed patient's most recent EGD and pathology.  Patient completed treatment for H. pylori and has a negative breath test on file.  Patient reports complete resolution of her symptoms since changing her diet and completing treatment regimen for H. pylori.  She is currently vegetarian and reports eating a very healthy diet.  She reports bowel movements have improved tremendously.  Patient denies fever, nausea, vomiting, weight loss, night sweats, melena, hematochezia, hematemesis.    Endoscopy: Review of the patient's most recent EGD performed by Dr. Valenzuela on 01.19.2022 small hiatal hernia normal mucosa throughout the esophagus stomach and duodenum.  H. pylori on biopsy.  Patient treated.    Gastric Emptying Study preformed 02.09.2022 normal    Colonoscopy: Review of the patient's most recent colonoscopy performed by Dr. Valenzuela on 03/02/2020 a single 3 mm polyp in the transverse colon.  Grade 1 internal hemorrhoids and diverticula.  Repeat in 5 years.  Ovular adenoma on pathology.      Results       Result Review :       CMP    CMP 8/17/21 1/25/22   Glucose 95 89   BUN 9 11   Creatinine 0.78 0.73   eGFR Non African Am 74 80   Sodium 141 141   Potassium 4.7 4.4   Chloride 108 (A) 105   Calcium 9.7 10.2   Albumin 4.10 4.40   Total Bilirubin <0.2 0.2   Alkaline Phosphatase 107 105   AST (SGOT) 20 17   ALT (SGPT) 13 11   (A) Abnormal value            CBC    CBC 5/26/21 8/17/21 1/25/22   WBC 7.87 9.64 7.23   RBC 4.42 4.76 4.85   Hemoglobin 13.9 14.8 15.0   Hematocrit 42.7 46.6 45.6   MCV 96.6 97.9 (A) 94.0   MCH 31.4 (A) 31.1 30.9   MCHC 32.6 (A) 31.8 32.9   RDW 13.3 12.3 12.3   Platelets 286 255 291   (A) Abnormal  value                          Past Medical History       Past Medical History:   Diagnosis Date   • Arthritis    • Cataract    • CTS (carpal tunnel syndrome) 20010   • GERD (gastroesophageal reflux disease)    • Hyperlipidemia    • Parkinson's disease (HCC)    • Sciatica    • Thoracic or lumbosacral neuritis or radiculitis        Past Surgical History:   Procedure Laterality Date   • BACK SURGERY      LOW BACK DISC   • CARPAL TUNNEL RELEASE     • CATARACT EXTRACTION     • COLONOSCOPY     • ENDOSCOPY     • ENDOSCOPY N/A 1/19/2022    Procedure: ESOPHAGOGASTRODUODENOSCOPY WITH BIOPSIES;  Surgeon: Luis Enrique Valenzuela MD;  Location: [facility] ENDOSCOPY;  Service: Gastroenterology;  Laterality: N/A;  HIATAL HERNIA   • FOOT SURGERY     • HYSTERECTOMY      DUE TO CANCER   • SHOULDER SURGERY     • TUBAL ABDOMINAL LIGATION     • UPPER GASTROINTESTINAL ENDOSCOPY           Current Outpatient Medications:   •  carbidopa-levodopa (SINEMET)  MG per tablet, Take 1 tablet by mouth 4 (Four) Times a Day. 1 tablet QID, Disp: 120 tablet, Rfl: 3  •  omeprazole (priLOSEC) 40 MG capsule, Take 1 capsule by mouth Daily. (Patient taking differently: Take 40 mg by mouth Daily As Needed.), Disp: 30 capsule, Rfl: 4  •  ondansetron ODT (ZOFRAN-ODT) 4 MG disintegrating tablet, Place 1 tablet on the tongue Every 8 (Eight) Hours As Needed for Nausea or Vomiting., Disp: 30 tablet, Rfl: 2     No Known Allergies    Family History   Problem Relation Age of Onset   • Depression Mother    • Hyperlipidemia Mother    • Hypertension Mother    • Alcohol abuse Father    • Depression Sister    • Hyperlipidemia Sister    • Hypertension Sister    • Osteoporosis Sister    • Depression Brother    • Hypertension Brother    • Alcohol abuse Brother    • Alcohol abuse Maternal Grandfather    • Alcohol abuse Paternal Grandfather    • Colon cancer Paternal Uncle    • Malig Hyperthermia Neg Hx         Social History     Social History Narrative   • Not on file  "      Objective     Vital Signs:   /55 (BP Location: Left arm, Patient Position: Sitting, Cuff Size: Adult)   Pulse 69   Ht 144.8 cm (57\")   Wt 53.2 kg (117 lb 4.8 oz)   SpO2 99%   BMI 25.38 kg/m²       Physical Exam  Constitutional:       General: She is not in acute distress.     Appearance: Normal appearance. She is well-developed and normal weight.   Eyes:      Conjunctiva/sclera: Conjunctivae normal.      Pupils: Pupils are equal, round, and reactive to light.      Visual Fields: Right eye visual fields normal and left eye visual fields normal.   Cardiovascular:      Rate and Rhythm: Normal rate and regular rhythm.      Heart sounds: Normal heart sounds.   Pulmonary:      Effort: Pulmonary effort is normal. No retractions.      Breath sounds: Normal breath sounds and air entry.      Comments: Inspection of chest: normal appearance  Abdominal:      General: Bowel sounds are normal.      Palpations: Abdomen is soft.      Tenderness: There is no abdominal tenderness.      Comments: No appreciable hepatosplenomegaly   Musculoskeletal:      Cervical back: Neck supple.      Right lower leg: No edema.      Left lower leg: No edema.   Lymphadenopathy:      Cervical: No cervical adenopathy.   Skin:     Findings: No lesion.      Comments: Turgor normal   Neurological:      Mental Status: She is alert and oriented to person, place, and time.   Psychiatric:         Mood and Affect: Mood and affect normal.           Assessment & Plan          Assessment and Plan    Diagnoses and all orders for this visit:    1. H. pylori infection (Primary)  Comments:  Resolved    2. Gastroesophageal reflux disease without esophagitis  Comments:  Resolved    3. Nausea  Comments:  Resolved    4. Parkinson's disease (HCC)      66-year-old female presenting to the office today for follow-up history of diverticulosis, colon polyps, gastritis, H. pylori, nausea, vomiting, poor appetite and Parkinson's.  We reviewed patient's most " recent EGD and pathology.  Patient completed treatment for H. pylori and has a negative breath test on file.  Patient reports complete resolution of her symptoms since changing her diet and completing treatment regimen for H. pylori.  Patient will follow up with us on an as-needed basis.  Patient will be due for repeat colonoscopy in 2025.  Patient agreeable to this plan will call with any questions or concerns.          Follow Up       Follow Up   Return if symptoms worsen or fail to improve.  Patient was given instructions and counseling regarding her condition or for health maintenance advice. Please see specific information pulled into the AVS if appropriate.

## 2022-04-08 ENCOUNTER — OFFICE VISIT (OUTPATIENT)
Dept: GASTROENTEROLOGY | Facility: CLINIC | Age: 67
End: 2022-04-08

## 2022-04-08 VITALS
BODY MASS INDEX: 25.31 KG/M2 | SYSTOLIC BLOOD PRESSURE: 128 MMHG | HEART RATE: 69 BPM | WEIGHT: 117.3 LBS | HEIGHT: 57 IN | DIASTOLIC BLOOD PRESSURE: 55 MMHG | OXYGEN SATURATION: 99 %

## 2022-04-08 DIAGNOSIS — G20 PARKINSON'S DISEASE: ICD-10-CM

## 2022-04-08 DIAGNOSIS — A04.8 H. PYLORI INFECTION: Primary | ICD-10-CM

## 2022-04-08 DIAGNOSIS — R11.0 NAUSEA: ICD-10-CM

## 2022-04-08 DIAGNOSIS — K21.9 GASTROESOPHAGEAL REFLUX DISEASE WITHOUT ESOPHAGITIS: ICD-10-CM

## 2022-04-08 PROCEDURE — 99213 OFFICE O/P EST LOW 20 MIN: CPT | Performed by: NURSE PRACTITIONER

## 2022-04-11 ENCOUNTER — OFFICE VISIT (OUTPATIENT)
Dept: NEUROLOGY | Facility: CLINIC | Age: 67
End: 2022-04-11

## 2022-04-11 VITALS
WEIGHT: 116.6 LBS | HEART RATE: 94 BPM | DIASTOLIC BLOOD PRESSURE: 86 MMHG | BODY MASS INDEX: 25.16 KG/M2 | SYSTOLIC BLOOD PRESSURE: 149 MMHG | HEIGHT: 57 IN

## 2022-04-11 DIAGNOSIS — G20 PARKINSON'S DISEASE: Primary | ICD-10-CM

## 2022-04-11 PROCEDURE — 99213 OFFICE O/P EST LOW 20 MIN: CPT | Performed by: NURSE PRACTITIONER

## 2022-04-11 NOTE — PROGRESS NOTES
"Chief Complaint  Parkinson's Disease    Subjective          Faith J Juan Alegria presents to [facility]  Has only been taking CD-LD on \"days I shake a lot\". States that she has decreased stress in her life, began eating very clean, walking with some friends, and states she feels she is much improved.        Interval History  Tremor started about 4 years ago.  Started with her head.  States her daughter first noticed it.  At first, she didn't notice it or was bothered by it.  Saw Dr. Ocasio previously.  About three years ago tremor started to bilateral hands.  States tremor has worsened recently.  Tremor worsens with intention.  Denies falls.  Denies any symptoms in her legs.  Mostly just the hands and head.  Denies freezing gait.  Endorses mild constipation.        Objective   Vital Signs:   /86   Pulse 94   Ht 144.8 cm (57\")   Wt 52.9 kg (116 lb 9.6 oz)   BMI 25.23 kg/m²     Physical Exam  Neurological:      Mental Status: She is oriented to person, place, and time.        Neurologic Exam     Mental Status   Oriented to person, place, and time.     Gait, Coordination, and Reflexes     Tremor   Resting tremor: present  Intention tremor: presentMild bradykinesia noted to bilateral  lower extremities.  Narrow base, moderate stride length with gait.  Decreased arm swing on left side. Mild rigidity.         Result Review :               Assessment and Plan    Diagnoses and all orders for this visit:    1. Parkinson's disease (HCC) (Primary)  Assessment & Plan:  She continues to appear parkinsonian on assessment. However, she feels she is moving better and having less tremor with her new lifestyle changes.  She wishes to hold off on medication management at this time. We will reassess in 6 months unless she has increased symptoms prior to that.         Follow Up   Return in about 6 months (around 10/11/2022) for parkinson's f/u.  Patient was given instructions and " counseling regarding her condition or for health maintenance advice. Please see specific information pulled into the AVS if appropriate.

## 2022-04-14 NOTE — ASSESSMENT & PLAN NOTE
She continues to appear parkinsonian on assessment. However, she feels she is moving better and having less tremor with her new lifestyle changes.  She wishes to hold off on medication management at this time. We will reassess in 6 months unless she has increased symptoms prior to that.

## 2022-05-09 ENCOUNTER — OFFICE VISIT (OUTPATIENT)
Dept: FAMILY MEDICINE CLINIC | Facility: CLINIC | Age: 67
End: 2022-05-09

## 2022-05-09 VITALS
HEART RATE: 78 BPM | BODY MASS INDEX: 25.1 KG/M2 | DIASTOLIC BLOOD PRESSURE: 60 MMHG | WEIGHT: 116 LBS | TEMPERATURE: 98 F | OXYGEN SATURATION: 98 % | SYSTOLIC BLOOD PRESSURE: 128 MMHG

## 2022-05-09 DIAGNOSIS — B02.9 HERPES ZOSTER WITHOUT COMPLICATION: Primary | ICD-10-CM

## 2022-05-09 PROCEDURE — 99213 OFFICE O/P EST LOW 20 MIN: CPT | Performed by: FAMILY MEDICINE

## 2022-05-09 RX ORDER — ACYCLOVIR 800 MG/1
800 TABLET ORAL
Qty: 35 TABLET | Refills: 0 | Status: SHIPPED | OUTPATIENT
Start: 2022-05-09 | End: 2022-05-16

## 2022-05-09 RX ORDER — PREDNISONE 20 MG/1
40 TABLET ORAL DAILY
Qty: 10 TABLET | Refills: 0 | Status: SHIPPED | OUTPATIENT
Start: 2022-05-09 | End: 2022-05-14

## 2022-05-09 NOTE — PROGRESS NOTES
Chief Complaint  Hair/Scalp Problem (Scalp tender x three days )    Subjective          Faith J Juan Alegria presents to [facility]  Rash  This is a new problem. The current episode started in the past 7 days. The problem has been gradually worsening since onset. Location: right side of face and top of head. Rash characteristics: vesicular. She was exposed to nothing. Pertinent negatives include no anorexia, congestion, cough, diarrhea, eye pain, facial edema, fatigue, fever, joint pain, nail changes, rhinorrhea, shortness of breath, sore throat or vomiting. Past treatments include nothing.       Objective   No Known Allergies  Immunization History   Administered Date(s) Administered   • COVID-19 (PFIZER) PURPLE CAP 04/24/2021, 05/15/2021, 11/23/2021   • Fluzone High-Dose 65+yrs 10/25/2021   • Influenza, Unspecified 10/15/2020   • Pneumococcal Polysaccharide (PPSV23) 07/23/2019   • Tdap 07/19/2017     Past Medical History:   Diagnosis Date   • Arthritis    • Cataract    • CTS (carpal tunnel syndrome) 20010   • GERD (gastroesophageal reflux disease)    • Hyperlipidemia    • Parkinson's disease (HCC)    • Sciatica    • Thoracic or lumbosacral neuritis or radiculitis       Past Surgical History:   Procedure Laterality Date   • BACK SURGERY      LOW BACK DISC   • CARPAL TUNNEL RELEASE     • CATARACT EXTRACTION     • COLONOSCOPY     • ENDOSCOPY     • ENDOSCOPY N/A 1/19/2022    Procedure: ESOPHAGOGASTRODUODENOSCOPY WITH BIOPSIES;  Surgeon: Luis Enrique Valenzuela MD;  Location: [facility] ENDOSCOPY;  Service: Gastroenterology;  Laterality: N/A;  HIATAL HERNIA   • FOOT SURGERY     • HYSTERECTOMY      DUE TO CANCER   • SHOULDER SURGERY     • TUBAL ABDOMINAL LIGATION     • UPPER GASTROINTESTINAL ENDOSCOPY        Social History     Socioeconomic History   • Marital status:    Tobacco Use   • Smoking status: Former Smoker     Packs/day: 0.00     Years: 20.00     Pack years: 0.00     Types:  Cigarettes     Quit date: 2022     Years since quittin.2   • Smokeless tobacco: Never Used   • Tobacco comment: 2 cigarettes per day   Vaping Use   • Vaping Use: Never used   Substance and Sexual Activity   • Alcohol use: Never   • Drug use: Never   • Sexual activity: Yes     Partners: Male        Current Outpatient Medications:   •  acyclovir (ZOVIRAX) 800 MG tablet, Take 1 tablet by mouth 5 (Five) Times a Day for 7 days., Disp: 35 tablet, Rfl: 0  •  omeprazole (priLOSEC) 40 MG capsule, Take 1 capsule by mouth Daily. (Patient taking differently: Take 40 mg by mouth Daily As Needed.), Disp: 30 capsule, Rfl: 4  •  ondansetron ODT (ZOFRAN-ODT) 4 MG disintegrating tablet, Place 1 tablet on the tongue Every 8 (Eight) Hours As Needed for Nausea or Vomiting., Disp: 30 tablet, Rfl: 2  •  predniSONE (DELTASONE) 20 MG tablet, Take 2 tablets by mouth Daily for 5 days., Disp: 10 tablet, Rfl: 0   Family History   Problem Relation Age of Onset   • Depression Mother    • Hyperlipidemia Mother    • Hypertension Mother    • Alcohol abuse Father    • Depression Sister    • Hyperlipidemia Sister    • Hypertension Sister    • Osteoporosis Sister    • Depression Brother    • Hypertension Brother    • Alcohol abuse Brother    • Alcohol abuse Maternal Grandfather    • Alcohol abuse Paternal Grandfather    • Colon cancer Paternal Uncle    • Malig Hyperthermia Neg Hx           Vital Signs:   Vitals:    22 0954   BP: 128/60   Pulse: 78   Temp: 98 °F (36.7 °C)   SpO2: 98%   Weight: 52.6 kg (116 lb)       Review of Systems   Constitutional: Negative for fatigue and fever.   HENT: Negative for congestion, rhinorrhea and sore throat.    Eyes: Negative for pain.   Respiratory: Negative for cough and shortness of breath.    Gastrointestinal: Negative for anorexia, diarrhea and vomiting.   Musculoskeletal: Negative for joint pain.   Skin: Positive for rash. Negative for nail changes.      Physical Exam  Vitals reviewed.    Constitutional:       Appearance: Normal appearance. She is well-developed.   HENT:      Head: Normocephalic and atraumatic.      Right Ear: External ear normal.      Left Ear: External ear normal.      Mouth/Throat:      Mouth: Mucous membranes are moist.      Pharynx: Oropharynx is clear. No oropharyngeal exudate or posterior oropharyngeal erythema.   Eyes:      Conjunctiva/sclera: Conjunctivae normal.      Pupils: Pupils are equal, round, and reactive to light.   Cardiovascular:      Rate and Rhythm: Normal rate and regular rhythm.      Pulses: Normal pulses.      Heart sounds: Normal heart sounds. No murmur heard.    No friction rub. No gallop.   Pulmonary:      Effort: Pulmonary effort is normal.      Breath sounds: Normal breath sounds. No wheezing or rhonchi.   Abdominal:      General: Abdomen is flat. Bowel sounds are normal. There is no distension.      Palpations: Abdomen is soft. There is no mass.      Tenderness: There is no abdominal tenderness. There is no guarding or rebound.      Hernia: No hernia is present.   Musculoskeletal:         General: Normal range of motion.   Skin:     General: Skin is warm and dry.      Capillary Refill: Capillary refill takes less than 2 seconds.      Comments: Vesicular rash on right face and top of head   Neurological:      General: No focal deficit present.      Mental Status: She is alert and oriented to person, place, and time.      Cranial Nerves: No cranial nerve deficit.   Psychiatric:         Mood and Affect: Mood and affect normal.         Behavior: Behavior normal.         Thought Content: Thought content normal.         Judgment: Judgment normal.        Result Review :                 Assessment and Plan    Diagnoses and all orders for this visit:    1. Herpes zoster without complication (Primary)  -     acyclovir (ZOVIRAX) 800 MG tablet; Take 1 tablet by mouth 5 (Five) Times a Day for 7 days.  Dispense: 35 tablet; Refill: 0  -     predniSONE (DELTASONE) 20  MG tablet; Take 2 tablets by mouth Daily for 5 days.  Dispense: 10 tablet; Refill: 0            Follow Up   Return if symptoms worsen or fail to improve.  Patient was given instructions and counseling regarding her condition or for health maintenance advice. Please see specific information pulled into the AVS if appropriate.

## 2022-07-21 ENCOUNTER — OFFICE VISIT (OUTPATIENT)
Dept: FAMILY MEDICINE CLINIC | Facility: CLINIC | Age: 67
End: 2022-07-21

## 2022-07-21 VITALS
OXYGEN SATURATION: 95 % | HEIGHT: 57 IN | DIASTOLIC BLOOD PRESSURE: 68 MMHG | HEART RATE: 86 BPM | BODY MASS INDEX: 26.1 KG/M2 | TEMPERATURE: 97.1 F | WEIGHT: 121 LBS | SYSTOLIC BLOOD PRESSURE: 124 MMHG

## 2022-07-21 DIAGNOSIS — Z71.85 VACCINE COUNSELING: ICD-10-CM

## 2022-07-21 DIAGNOSIS — M48.061 SPINAL STENOSIS OF LUMBAR REGION, UNSPECIFIED WHETHER NEUROGENIC CLAUDICATION PRESENT: ICD-10-CM

## 2022-07-21 DIAGNOSIS — G20 PARKINSON'S DISEASE: ICD-10-CM

## 2022-07-21 DIAGNOSIS — E78.2 MIXED HYPERLIPIDEMIA: ICD-10-CM

## 2022-07-21 DIAGNOSIS — E66.3 OVER WEIGHT: ICD-10-CM

## 2022-07-21 DIAGNOSIS — Z00.00 MEDICARE ANNUAL WELLNESS VISIT, SUBSEQUENT: Primary | ICD-10-CM

## 2022-07-21 PROBLEM — R63.0 LOSS OF APPETITE: Status: RESOLVED | Noted: 2021-08-17 | Resolved: 2022-07-21

## 2022-07-21 PROBLEM — K21.9 GERD (GASTROESOPHAGEAL REFLUX DISEASE): Status: RESOLVED | Noted: 2021-08-17 | Resolved: 2022-07-21

## 2022-07-21 PROBLEM — R11.0 NAUSEA: Status: RESOLVED | Noted: 2021-08-17 | Resolved: 2022-07-21

## 2022-07-21 LAB
ALBUMIN SERPL-MCNC: 4.4 G/DL (ref 3.5–5.2)
ALBUMIN/GLOB SERPL: 1.9 G/DL
ALP SERPL-CCNC: 95 U/L (ref 39–117)
ALT SERPL W P-5'-P-CCNC: 8 U/L (ref 1–33)
ANION GAP SERPL CALCULATED.3IONS-SCNC: 9.6 MMOL/L (ref 5–15)
AST SERPL-CCNC: 16 U/L (ref 1–32)
BILIRUB SERPL-MCNC: 0.2 MG/DL (ref 0–1.2)
BUN SERPL-MCNC: 5 MG/DL (ref 8–23)
BUN/CREAT SERPL: 7.7 (ref 7–25)
CALCIUM SPEC-SCNC: 9.9 MG/DL (ref 8.6–10.5)
CHLORIDE SERPL-SCNC: 107 MMOL/L (ref 98–107)
CHOLEST SERPL-MCNC: 195 MG/DL (ref 0–200)
CO2 SERPL-SCNC: 25.4 MMOL/L (ref 22–29)
CREAT SERPL-MCNC: 0.65 MG/DL (ref 0.57–1)
DEPRECATED RDW RBC AUTO: 42.5 FL (ref 37–54)
EGFRCR SERPLBLD CKD-EPI 2021: 96 ML/MIN/1.73
ERYTHROCYTE [DISTWIDTH] IN BLOOD BY AUTOMATED COUNT: 12.5 % (ref 12.3–15.4)
GLOBULIN UR ELPH-MCNC: 2.3 GM/DL
GLUCOSE SERPL-MCNC: 93 MG/DL (ref 65–99)
HCT VFR BLD AUTO: 43.2 % (ref 34–46.6)
HDLC SERPL-MCNC: 59 MG/DL (ref 40–60)
HGB BLD-MCNC: 14.6 G/DL (ref 12–15.9)
LDLC SERPL CALC-MCNC: 113 MG/DL (ref 0–100)
LDLC/HDLC SERPL: 1.86 {RATIO}
MCH RBC QN AUTO: 32 PG (ref 26.6–33)
MCHC RBC AUTO-ENTMCNC: 33.8 G/DL (ref 31.5–35.7)
MCV RBC AUTO: 94.7 FL (ref 79–97)
PLATELET # BLD AUTO: 283 10*3/MM3 (ref 140–450)
PMV BLD AUTO: 10 FL (ref 6–12)
POTASSIUM SERPL-SCNC: 4 MMOL/L (ref 3.5–5.2)
PROT SERPL-MCNC: 6.7 G/DL (ref 6–8.5)
RBC # BLD AUTO: 4.56 10*6/MM3 (ref 3.77–5.28)
SODIUM SERPL-SCNC: 142 MMOL/L (ref 136–145)
TRIGL SERPL-MCNC: 130 MG/DL (ref 0–150)
TSH SERPL DL<=0.05 MIU/L-ACNC: 1.27 UIU/ML (ref 0.27–4.2)
VLDLC SERPL-MCNC: 23 MG/DL (ref 5–40)
WBC NRBC COR # BLD: 9.08 10*3/MM3 (ref 3.4–10.8)

## 2022-07-21 PROCEDURE — 1170F FXNL STATUS ASSESSED: CPT | Performed by: NURSE PRACTITIONER

## 2022-07-21 PROCEDURE — 80061 LIPID PANEL: CPT | Performed by: NURSE PRACTITIONER

## 2022-07-21 PROCEDURE — 80053 COMPREHEN METABOLIC PANEL: CPT | Performed by: NURSE PRACTITIONER

## 2022-07-21 PROCEDURE — 1126F AMNT PAIN NOTED NONE PRSNT: CPT | Performed by: NURSE PRACTITIONER

## 2022-07-21 PROCEDURE — G0439 PPPS, SUBSEQ VISIT: HCPCS | Performed by: NURSE PRACTITIONER

## 2022-07-21 PROCEDURE — 1159F MED LIST DOCD IN RCRD: CPT | Performed by: NURSE PRACTITIONER

## 2022-07-21 PROCEDURE — 84443 ASSAY THYROID STIM HORMONE: CPT | Performed by: NURSE PRACTITIONER

## 2022-07-21 PROCEDURE — 85027 COMPLETE CBC AUTOMATED: CPT | Performed by: NURSE PRACTITIONER

## 2022-07-21 PROCEDURE — 36415 COLL VENOUS BLD VENIPUNCTURE: CPT | Performed by: NURSE PRACTITIONER

## 2022-07-21 NOTE — PROGRESS NOTES
The ABCs of the Annual Wellness Visit  Subsequent Medicare Wellness Visit    Pt was Dx'd with parkinson's and pt changed her entire diet regimen--and water all day and then more fruits and veggies and eats like a vegetarian and could not tolerate the medicine that was given and had to stop and then F/U with them in Oct 2022--then overall felt better and s/s were improved--then lately reports some more shuffling gait and more hand tremors--no falls     Patient reports she is not taking any medications whatsoever and she is only just trying to drink lots of water and stay on a very healthy diet-and reads her Bible and stays busy    Chief Complaint   Patient presents with   • Medicare Wellness-subsequent     Care gaps      Subjective    History of Present Illness:  Faith Alegria is a 67 y.o. female who presents for a Subsequent Medicare Wellness Visit.    The following portions of the patient's history were reviewed and   updated as appropriate: allergies, current medications, past family history, past medical history, past social history, past surgical history and problem list.    Compared to one year ago, the patient feels her physical   health is better.    Compared to one year ago, the patient feels her mental   health is better.    Recent Hospitalizations:  She was not admitted to the hospital during the last year.       Current Medical Providers:  Patient Care Team:  Rosalba Smith APRN as PCP - General (Nurse Practitioner)    Outpatient Medications Prior to Visit   Medication Sig Dispense Refill   • omeprazole (priLOSEC) 40 MG capsule Take 1 capsule by mouth Daily. (Patient taking differently: Take 40 mg by mouth Daily As Needed.) 30 capsule 4   • ondansetron ODT (ZOFRAN-ODT) 4 MG disintegrating tablet Place 1 tablet on the tongue Every 8 (Eight) Hours As Needed for Nausea or Vomiting. 30 tablet 2     No facility-administered medications prior to visit.       No opioid medication identified on  active medication list. I have reviewed chart for other potential  high risk medication/s and harmful drug interactions in the elderly.          Aspirin is not on active medication list.  Aspirin use is not indicated based on review of current medical condition/s. Risk of harm outweighs potential benefits.  .    Patient Active Problem List   Diagnosis   • Thoracic or lumbosacral neuritis or radiculitis   • Sciatica   • Lumbar spinal stenosis   • Hyperlipidemia   • Arthritis   • Cataract   • Parkinson's disease (HCC)     Advance Care Planning  Advance Directive is on file.  ACP discussion was held with the patient during this visit. Patient has an advance directive in EMR which is still valid.     Review of Systems   Constitutional: Negative for fatigue.   HENT: Negative.  Negative for trouble swallowing.    Eyes: Negative.    Respiratory: Negative for choking and shortness of breath.    Cardiovascular: Negative for chest pain, palpitations and leg swelling.   Gastrointestinal: Negative for abdominal pain, blood in stool and nausea.        No GERD    Endocrine: Negative.    Genitourinary: Negative for dysuria and vaginal bleeding.   Musculoskeletal: Positive for arthralgias, back pain and gait problem.        More like she is shuffling more and then gait changes and fine tremors of the hands    Skin: Negative for rash and wound.   Allergic/Immunologic: Negative for food allergies.   Neurological: Positive for tremors. Negative for dizziness, seizures, syncope, light-headedness and numbness.        Fine of the hands    Hematological: Does not bruise/bleed easily.   Psychiatric/Behavioral: Negative for decreased concentration, dysphoric mood, self-injury, sleep disturbance and suicidal ideas. The patient is not nervous/anxious.         Objective    Vitals:    07/21/22 1055   BP: 124/68   BP Location: Right arm   Patient Position: Sitting   Cuff Size: Adult   Pulse: 86   Temp: 97.1 °F (36.2 °C)   TempSrc: Temporal  "  SpO2: 95%   Weight: 54.9 kg (121 lb)   Height: 144.8 cm (57\")   PainSc: 0-No pain     Estimated body mass index is 26.18 kg/m² as calculated from the following:    Height as of this encounter: 144.8 cm (57\").    Weight as of this encounter: 54.9 kg (121 lb).    BMI is >= 25 and <30. (Overweight) The following options were offered after discussion;: exercise counseling/recommendations and nutrition counseling/recommendations      Does the patient have evidence of cognitive impairment? No    Physical Exam  Vitals and nursing note reviewed.   Constitutional:       Appearance: Normal appearance.   HENT:      Head: Normocephalic.      Right Ear: External ear normal.      Left Ear: External ear normal.      Nose: Nose normal.      Mouth/Throat:      Comments: Wearing mask  Eyes:      Pupils: Pupils are equal, round, and reactive to light.   Cardiovascular:      Rate and Rhythm: Normal rate and regular rhythm.      Pulses:           Carotid pulses are 2+ on the right side and 2+ on the left side.     Heart sounds: Normal heart sounds. No murmur heard.  Pulmonary:      Effort: Pulmonary effort is normal.      Breath sounds: Normal breath sounds.   Abdominal:      General: Bowel sounds are normal.      Palpations: Abdomen is soft.      Tenderness: There is no abdominal tenderness.   Musculoskeletal:         General: Normal range of motion.      Cervical back: Normal range of motion and neck supple.      Right lower leg: No edema.      Left lower leg: No edema.   Skin:     General: Skin is warm and dry.   Neurological:      Mental Status: She is alert and oriented to person, place, and time.      Comments: Very fine hand tremor R>L    Psychiatric:         Mood and Affect: Mood normal.         Behavior: Behavior normal.         Thought Content: Thought content normal.         Judgment: Judgment normal.                 HEALTH RISK ASSESSMENT    Smoking Status:  Social History     Tobacco Use   Smoking Status Former Smoker   • " Packs/day: 0.00   • Years: 20.00   • Pack years: 0.00   • Types: Cigarettes   • Quit date: 2022   • Years since quittin.4   Smokeless Tobacco Never Used   Tobacco Comment    2 cigarettes per day     Alcohol Consumption:  Social History     Substance and Sexual Activity   Alcohol Use Never     Fall Risk Screen:    GUANAKO Fall Risk Assessment was completed, and patient is at LOW risk for falls.Assessment completed on:1/10/2022    Depression Screening:  PHQ-2/PHQ-9 Depression Screening 2022   Retired PHQ-9 Total Score -   Retired Total Score -   Little Interest or Pleasure in Doing Things 0-->not at all   Feeling Down, Depressed or Hopeless 1-->several days   PHQ-9: Brief Depression Severity Measure Score 1       Health Habits and Functional and Cognitive Screening:  Functional & Cognitive Status 2022   Do you have difficulty preparing food and eating? No   Do you have difficulty bathing yourself, getting dressed or grooming yourself? No   Do you have difficulty using the toilet? No   Do you have difficulty moving around from place to place? No   Do you have trouble with steps or getting out of a bed or a chair? No   Current Diet Well Balanced Diet   Dental Exam Up to date   Eye Exam Up to date   Exercise (times per week) 7 times per week   Current Exercises Include Yard Work;Walking;House Cleaning   Do you need help using the phone?  No   Are you deaf or do you have serious difficulty hearing?  No   Do you need help with transportation? No   Do you need help shopping? No   Do you need help preparing meals?  No   Do you need help with housework?  No   Do you need help with laundry? No   Do you need help taking your medications? No   Do you need help managing money? No   Do you ever drive or ride in a car without wearing a seat belt? No   Have you felt unusual stress, anger or loneliness in the last month? Yes   Who do you live with? Spouse   If you need help, do you have trouble finding someone  available to you? No   Have you been bothered in the last four weeks by sexual problems? No   Do you have difficulty concentrating, remembering or making decisions? No       Age-appropriate Screening Schedule:  Refer to the list below for future screening recommendations based on patient's age, sex and/or medical conditions. Orders for these recommended tests are listed in the plan section. The patient has been provided with a written plan.    Health Maintenance   Topic Date Due   • ZOSTER VACCINE (1 of 2) Never done   • DXA SCAN  07/16/2021   • INFLUENZA VACCINE  10/01/2022   • LIPID PANEL  01/25/2023   • MAMMOGRAM  08/24/2023   • TDAP/TD VACCINES (2 - Td or Tdap) 07/19/2027              Assessment & Plan   CMS Preventative Services Quick Reference  Risk Factors Identified During Encounter  Immunizations Discussed/Encouraged (specific Immunizations; Shingrix and prevnar 13   Obesity/Overweight   cholesterol  The above risks/problems have been discussed with the patient.  Follow up actions/plans if indicated are seen below in the Assessment/Plan Section.  Pertinent information has been shared with the patient in the After Visit Summary.    Diagnoses and all orders for this visit:    1. Medicare annual wellness visit, subsequent (Primary)  -     Lipid Panel  -     Comprehensive Metabolic Panel  -     TSH  -     CBC (No Diff)    2. Mixed hyperlipidemia  -     Lipid Panel  -     Comprehensive Metabolic Panel  -     TSH  -     CBC (No Diff)    3. Parkinson's disease (HCC)  Comments:  sees neurologist   Orders:  -     Lipid Panel  -     Comprehensive Metabolic Panel  -     TSH  -     CBC (No Diff)    4. Spinal stenosis of lumbar region, unspecified whether neurogenic claudication present  Comments:  much better     5. Vaccine counseling  -     pneumococcal conj. 13-valent (PREVNAR-13) vaccine; Inject 0.5 mL into the appropriate muscle as directed by prescriber 1 (One) Time for 1 dose.  Dispense: 0.5 mL; Refill: 0  -      Zoster Vac Recomb Adjuvanted 50 MCG/0.5ML reconstituted suspension; Inject 0.5 mL into the appropriate muscle as directed by prescriber 1 (One) Time for 1 dose. 2nd dose 2-6 months later  Dispense: 1 each; Refill: 1    6. Over weight  Comments:  counseled diet and exercise     following up with the neurologist in Oct 2022     Follow Up:   Return in about 1 year (around 7/21/2023), or if symptoms worsen or fail to improve, for Medicare Wellness, fasting labs and refills.     An After Visit Summary and PPPS were made available to the patient.

## 2022-07-21 NOTE — PROGRESS NOTES
Venipuncture Blood Specimen Collection  Venipuncture performed in left arm  by Karmen Dooley with good hemostasis. Patient tolerated the procedure well without complications.   07/21/22   Karmen Dooley

## 2022-07-22 NOTE — PROGRESS NOTES
Please mail letter to patient stating    Faith your thyroid levels were normal range and your cholesterol panel was all normal with the LDL being the lowest that it has been in over 3 years at 113 and normal is 100 or less; comprehensive panel shows normal glucose and normal kidney and liver function tests and normal electrolytes; and all of your blood counts were normal range

## 2022-08-26 ENCOUNTER — APPOINTMENT (OUTPATIENT)
Dept: BONE DENSITY | Facility: HOSPITAL | Age: 67
End: 2022-08-26

## 2022-08-26 ENCOUNTER — APPOINTMENT (OUTPATIENT)
Dept: MAMMOGRAPHY | Facility: HOSPITAL | Age: 67
End: 2022-08-26

## 2022-10-11 ENCOUNTER — OFFICE VISIT (OUTPATIENT)
Dept: NEUROLOGY | Facility: CLINIC | Age: 67
End: 2022-10-11

## 2022-10-11 VITALS
BODY MASS INDEX: 26.75 KG/M2 | WEIGHT: 124 LBS | DIASTOLIC BLOOD PRESSURE: 74 MMHG | HEART RATE: 61 BPM | SYSTOLIC BLOOD PRESSURE: 135 MMHG | HEIGHT: 57 IN

## 2022-10-11 DIAGNOSIS — G20 PARKINSON'S DISEASE: Primary | ICD-10-CM

## 2022-10-11 PROCEDURE — 99213 OFFICE O/P EST LOW 20 MIN: CPT | Performed by: NURSE PRACTITIONER

## 2022-10-14 NOTE — ASSESSMENT & PLAN NOTE
Assessment continues to show parkinsonism.  She does not wish to be medicated at this time.  Discussed that symptoms could progress and she could have increased slowness and stiffness in the future.  She verbalized understanding.  She will notify the office if symptoms worsen and she wishes to consider medication management.

## 2023-07-25 ENCOUNTER — OFFICE VISIT (OUTPATIENT)
Dept: FAMILY MEDICINE CLINIC | Facility: CLINIC | Age: 68
End: 2023-07-25
Payer: MEDICARE

## 2023-07-25 VITALS
HEIGHT: 57 IN | WEIGHT: 131 LBS | SYSTOLIC BLOOD PRESSURE: 130 MMHG | HEART RATE: 76 BPM | BODY MASS INDEX: 28.26 KG/M2 | DIASTOLIC BLOOD PRESSURE: 72 MMHG | TEMPERATURE: 96.4 F | OXYGEN SATURATION: 97 %

## 2023-07-25 DIAGNOSIS — E66.3 OVERWEIGHT (BMI 25.0-29.9): ICD-10-CM

## 2023-07-25 DIAGNOSIS — M85.80 OSTEOPENIA, UNSPECIFIED LOCATION: ICD-10-CM

## 2023-07-25 DIAGNOSIS — Z00.00 MEDICARE ANNUAL WELLNESS VISIT, SUBSEQUENT: Primary | ICD-10-CM

## 2023-07-25 DIAGNOSIS — Z23 ENCOUNTER FOR IMMUNIZATION: ICD-10-CM

## 2023-07-25 DIAGNOSIS — G20 PARKINSON'S DISEASE: ICD-10-CM

## 2023-07-25 DIAGNOSIS — Z12.31 SCREENING MAMMOGRAM FOR BREAST CANCER: ICD-10-CM

## 2023-07-25 DIAGNOSIS — Z78.0 POSTMENOPAUSAL: ICD-10-CM

## 2023-07-25 DIAGNOSIS — R53.83 FATIGUE, UNSPECIFIED TYPE: ICD-10-CM

## 2023-07-25 DIAGNOSIS — E78.2 MIXED HYPERLIPIDEMIA: ICD-10-CM

## 2023-07-25 DIAGNOSIS — R39.89 SENSATION OF PRESSURE IN BLADDER AREA: ICD-10-CM

## 2023-07-25 DIAGNOSIS — N39.46 URGE AND STRESS INCONTINENCE: ICD-10-CM

## 2023-07-25 LAB
ALBUMIN SERPL-MCNC: 4.2 G/DL (ref 3.5–5.2)
ALBUMIN/GLOB SERPL: 1.9 G/DL
ALP SERPL-CCNC: 91 U/L (ref 39–117)
ALT SERPL W P-5'-P-CCNC: 12 U/L (ref 1–33)
ANION GAP SERPL CALCULATED.3IONS-SCNC: 9.5 MMOL/L (ref 5–15)
AST SERPL-CCNC: 24 U/L (ref 1–32)
BACTERIA UR QL AUTO: ABNORMAL /HPF
BASOPHILS # BLD AUTO: 0.04 10*3/MM3 (ref 0–0.2)
BASOPHILS NFR BLD AUTO: 0.6 % (ref 0–1.5)
BILIRUB SERPL-MCNC: 0.3 MG/DL (ref 0–1.2)
BILIRUB UR QL STRIP: NEGATIVE
BUN SERPL-MCNC: 9 MG/DL (ref 8–23)
BUN/CREAT SERPL: 10.8 (ref 7–25)
CALCIUM SPEC-SCNC: 9.6 MG/DL (ref 8.6–10.5)
CHLORIDE SERPL-SCNC: 110 MMOL/L (ref 98–107)
CHOLEST SERPL-MCNC: 215 MG/DL (ref 0–200)
CLARITY UR: ABNORMAL
CO2 SERPL-SCNC: 25.5 MMOL/L (ref 22–29)
COLOR UR: YELLOW
CREAT SERPL-MCNC: 0.83 MG/DL (ref 0.57–1)
DEPRECATED RDW RBC AUTO: 43.1 FL (ref 37–54)
EGFRCR SERPLBLD CKD-EPI 2021: 76.4 ML/MIN/1.73
EOSINOPHIL # BLD AUTO: 0.18 10*3/MM3 (ref 0–0.4)
EOSINOPHIL NFR BLD AUTO: 2.5 % (ref 0.3–6.2)
ERYTHROCYTE [DISTWIDTH] IN BLOOD BY AUTOMATED COUNT: 12.6 % (ref 12.3–15.4)
GLOBULIN UR ELPH-MCNC: 2.2 GM/DL
GLUCOSE SERPL-MCNC: 95 MG/DL (ref 65–99)
GLUCOSE UR STRIP-MCNC: NEGATIVE MG/DL
HCT VFR BLD AUTO: 42.3 % (ref 34–46.6)
HDLC SERPL-MCNC: 51 MG/DL (ref 40–60)
HGB BLD-MCNC: 14.1 G/DL (ref 12–15.9)
HGB UR QL STRIP.AUTO: ABNORMAL
HYALINE CASTS UR QL AUTO: ABNORMAL /LPF
IMM GRANULOCYTES # BLD AUTO: 0.04 10*3/MM3 (ref 0–0.05)
IMM GRANULOCYTES NFR BLD AUTO: 0.6 % (ref 0–0.5)
KETONES UR QL STRIP: NEGATIVE
LDLC SERPL CALC-MCNC: 135 MG/DL (ref 0–100)
LDLC/HDLC SERPL: 2.57 {RATIO}
LEUKOCYTE ESTERASE UR QL STRIP.AUTO: NEGATIVE
LYMPHOCYTES # BLD AUTO: 2.25 10*3/MM3 (ref 0.7–3.1)
LYMPHOCYTES NFR BLD AUTO: 31 % (ref 19.6–45.3)
MCH RBC QN AUTO: 31.4 PG (ref 26.6–33)
MCHC RBC AUTO-ENTMCNC: 33.3 G/DL (ref 31.5–35.7)
MCV RBC AUTO: 94.2 FL (ref 79–97)
MONOCYTES # BLD AUTO: 0.54 10*3/MM3 (ref 0.1–0.9)
MONOCYTES NFR BLD AUTO: 7.4 % (ref 5–12)
NEUTROPHILS NFR BLD AUTO: 4.21 10*3/MM3 (ref 1.7–7)
NEUTROPHILS NFR BLD AUTO: 57.9 % (ref 42.7–76)
NITRITE UR QL STRIP: NEGATIVE
NRBC BLD AUTO-RTO: 0 /100 WBC (ref 0–0.2)
PH UR STRIP.AUTO: 6 [PH] (ref 5–8)
PLATELET # BLD AUTO: 249 10*3/MM3 (ref 140–450)
PMV BLD AUTO: 10.2 FL (ref 6–12)
POTASSIUM SERPL-SCNC: 4.3 MMOL/L (ref 3.5–5.2)
PROT SERPL-MCNC: 6.4 G/DL (ref 6–8.5)
PROT UR QL STRIP: NEGATIVE
RBC # BLD AUTO: 4.49 10*6/MM3 (ref 3.77–5.28)
RBC # UR STRIP: ABNORMAL /HPF
REF LAB TEST METHOD: ABNORMAL
SODIUM SERPL-SCNC: 145 MMOL/L (ref 136–145)
SP GR UR STRIP: 1.02 (ref 1–1.03)
SQUAMOUS #/AREA URNS HPF: ABNORMAL /HPF
TRIGL SERPL-MCNC: 165 MG/DL (ref 0–150)
TSH SERPL DL<=0.05 MIU/L-ACNC: 2.65 UIU/ML (ref 0.27–4.2)
UROBILINOGEN UR QL STRIP: ABNORMAL
VLDLC SERPL-MCNC: 29 MG/DL (ref 5–40)
WBC # UR STRIP: ABNORMAL /HPF
WBC NRBC COR # BLD: 7.26 10*3/MM3 (ref 3.4–10.8)

## 2023-07-25 PROCEDURE — 80053 COMPREHEN METABOLIC PANEL: CPT | Performed by: NURSE PRACTITIONER

## 2023-07-25 PROCEDURE — 84443 ASSAY THYROID STIM HORMONE: CPT | Performed by: NURSE PRACTITIONER

## 2023-07-25 PROCEDURE — 80061 LIPID PANEL: CPT | Performed by: NURSE PRACTITIONER

## 2023-07-25 PROCEDURE — 85025 COMPLETE CBC W/AUTO DIFF WBC: CPT | Performed by: NURSE PRACTITIONER

## 2023-07-25 PROCEDURE — 81001 URINALYSIS AUTO W/SCOPE: CPT | Performed by: NURSE PRACTITIONER

## 2023-07-25 NOTE — PROGRESS NOTES
Venipuncture Blood Specimen Collection  Venipuncture performed in left arm by Karmen Dooley with good hemostasis. Patient tolerated the procedure well without complications.   07/25/23   Karmen Dooley

## 2023-07-25 NOTE — PROGRESS NOTES
"The ABCs of the Annual Wellness Visit  Subsequent Medicare Wellness Visit    Subjective    Faith Alegria is a 68 y.o. female who presents for a Subsequent Medicare Wellness Visit.    The following portions of the patient's history were reviewed and   updated as appropriate: allergies, current medications, past family history, past medical history, past social history, past surgical history, and problem list.    Compared to one year ago, the patient feels her physical   health is the same.    Compared to one year ago, the patient feels her mental   health is the same.    Recent Hospitalizations:  She was not admitted to the hospital during the last year.       Current Medical Providers:  Patient Care Team:  Rosalba Smith APRN as PCP - General (Nurse Practitioner)    No outpatient medications prior to visit.     No facility-administered medications prior to visit.       No opioid medication identified on active medication list. I have reviewed chart for other potential  high risk medication/s and harmful drug interactions in the elderly.        Aspirin is not on active medication list.  Aspirin use is not indicated based on review of current medical condition/s. Risk of harm outweighs potential benefits.  .    Patient Active Problem List   Diagnosis    Thoracic or lumbosacral neuritis or radiculitis    Sciatica    Lumbar spinal stenosis    Hyperlipidemia    Arthritis    Cataract    Parkinson's disease     Advance Care Planning   Advance Care Planning     Advance Directive is on file.  ACP discussion was held with the patient during this visit. Patient has an advance directive in EMR which is still valid.      Objective    Vitals:    07/25/23 0800   BP: 130/72   BP Location: Right arm   Patient Position: Sitting   Cuff Size: Adult   Pulse: 76   Temp: 96.4 °F (35.8 °C)   TempSrc: Temporal   SpO2: 97%   Weight: 59.4 kg (131 lb)   Height: 144.8 cm (57\")   PainSc:   4   PainLoc: Ankle  Comment: left " "    Estimated body mass index is 28.35 kg/m² as calculated from the following:    Height as of this encounter: 144.8 cm (57\").    Weight as of this encounter: 59.4 kg (131 lb).    BMI is >= 25 and <30. (Overweight) The following options were offered after discussion;: exercise counseling/recommendations and nutrition counseling/recommendations      Does the patient have evidence of cognitive impairment? No          HEALTH RISK ASSESSMENT    Smoking Status:  Social History     Tobacco Use   Smoking Status Former    Packs/day: 0.00    Years: 20.00    Pack years: 0.00    Types: Cigarettes    Quit date: 2022    Years since quittin.4   Smokeless Tobacco Never   Tobacco Comments    2 cigarettes per day     Alcohol Consumption:  Social History     Substance and Sexual Activity   Alcohol Use Never     Fall Risk Screen:    GUANAKO Fall Risk Assessment was completed, and patient is at LOW risk for falls.Assessment completed on:2023    Depression Screenin/25/2023     8:05 AM   PHQ-2/PHQ-9 Depression Screening   Little Interest or Pleasure in Doing Things 0-->not at all   Feeling Down, Depressed or Hopeless 0-->not at all   PHQ-9: Brief Depression Severity Measure Score 0       Health Habits and Functional and Cognitive Screenin/25/2023     8:03 AM   Functional & Cognitive Status   Do you have difficulty preparing food and eating? No   Do you have difficulty bathing yourself, getting dressed or grooming yourself? No   Do you have difficulty using the toilet? Yes   Do you have difficulty moving around from place to place? No   Do you have trouble with steps or getting out of a bed or a chair? No   Current Diet Well Balanced Diet   Dental Exam Not up to date   Eye Exam Up to date   Exercise (times per week) 8 times per week   Current Exercises Include Yard Work;Walking;House Cleaning   Do you need help using the phone?  No   Are you deaf or do you have serious difficulty hearing?  No   Do you need " help to go to places out of walking distance? No   Do you need help shopping? No   Do you need help preparing meals?  No   Do you need help with housework?  No   Do you need help with laundry? No   Do you need help taking your medications? No   Do you need help managing money? No   Do you ever drive or ride in a car without wearing a seat belt? No   Have you felt unusual stress, anger or loneliness in the last month? No   Who do you live with? Spouse   If you need help, do you have trouble finding someone available to you? No   Have you been bothered in the last four weeks by sexual problems? No   Do you have difficulty concentrating, remembering or making decisions? No       Age-appropriate Screening Schedule:  Refer to the list below for future screening recommendations based on patient's age, sex and/or medical conditions. Orders for these recommended tests are listed in the plan section. The patient has been provided with a written plan.    Health Maintenance   Topic Date Due    ZOSTER VACCINE (1 of 2) Never done    Pneumococcal Vaccine 65+ (2 - PCV) 07/23/2020    DXA SCAN  07/16/2021    COVID-19 Vaccine (5 - Pfizer series) 09/19/2022    LIPID PANEL  07/21/2023    MAMMOGRAM  08/24/2023    INFLUENZA VACCINE  10/01/2023    ANNUAL WELLNESS VISIT  07/25/2024    COLORECTAL CANCER SCREENING  03/02/2025    TDAP/TD VACCINES (2 - Td or Tdap) 07/19/2027    HEPATITIS C SCREENING  Completed                  CMS Preventative Services Quick Reference  Risk Factors Identified During Encounter  Immunizations Discussed/Encouraged: Prevnar 20 (Pneumococcal 20-valent conjugate), Shingrix, and COVID19  The above risks/problems have been discussed with the patient.  Pertinent information has been shared with the patient in the After Visit Summary.  An After Visit Summary and PPPS were made available to the patient.    Follow Up:   Next Medicare Wellness visit to be scheduled in 1 year.       Additional E&M Note during same encounter  follows:  Patient has multiple medical problems which are significant and separately identifiable that require additional work above and beyond the Medicare Wellness Visit.      Chief Complaint  Medicare Wellness-subsequent (Medication Refills)    Subjective        She is here today for her Medicare wellness, subsequent    She also would like to get her fasting labs and she does have history of dyslipidemia but she will not take any type of statins tries to control everything with her diet and currently is on no medications    She does have a history of Parkinson's and she does get fatigued very easily but is on no medications overall is doing well she will cooks and cleans at home still drives and no choking issues    Also she reports that she has had a history of bladder repair and that for years now her bladder has started bothering her again she does not feel as though it is overactive bladder she feels as though it is a urgent stress incontinence that she feels a lot of pressure like she may need another bladder tacking and she wants referral to urology    Also its been 2 years and she had her mammogram and her bone density and denies having any type of issues    Faith Martinez  is also being seen today for pt also would like referral to urology for further eval of the trouble with her bladder Hx of bladder repair in the past  and fasting labs as well and also her BD and mammo are due     Review of Systems   Constitutional:  Positive for fatigue.   HENT:  Negative for trouble swallowing.    Eyes:  Negative for visual disturbance.   Respiratory:  Negative for choking and shortness of breath.    Cardiovascular:  Negative for chest pain.   Gastrointestinal:  Negative for abdominal pain and blood in stool.   Endocrine: Positive for polydipsia. Negative for polyphagia and polyuria.   Genitourinary:  Positive for frequency and urgency. Negative for vaginal bleeding.        No breast tenderness or lumps or pain  "or DC    Musculoskeletal:  Positive for arthralgias, back pain and neck pain.   Skin: Negative.    Neurological:  Negative for dizziness, seizures, syncope and light-headedness.   Hematological:  Does not bruise/bleed easily.   Psychiatric/Behavioral:  Negative for self-injury and suicidal ideas.      Objective   Vital Signs:  /72 (BP Location: Right arm, Patient Position: Sitting, Cuff Size: Adult)   Pulse 76   Temp 96.4 °F (35.8 °C) (Temporal)   Ht 144.8 cm (57\")   Wt 59.4 kg (131 lb)   SpO2 97%   BMI 28.35 kg/m²     Physical Exam  Vitals and nursing note reviewed.   Constitutional:       Appearance: Normal appearance.      Comments: BMI 28.35   HENT:      Head: Normocephalic.      Right Ear: External ear normal.      Left Ear: External ear normal.      Nose: Nose normal.      Mouth/Throat:      Mouth: Mucous membranes are moist.   Eyes:      Pupils: Pupils are equal, round, and reactive to light.      Comments: Glasses    Cardiovascular:      Rate and Rhythm: Normal rate and regular rhythm.      Pulses:           Carotid pulses are 2+ on the right side and 2+ on the left side.     Heart sounds: Normal heart sounds. No murmur heard.  Pulmonary:      Effort: Pulmonary effort is normal.      Breath sounds: Normal breath sounds.   Abdominal:      Palpations: Abdomen is soft.      Tenderness: There is no abdominal tenderness.   Musculoskeletal:         General: Normal range of motion.      Cervical back: Normal range of motion and neck supple.      Right lower leg: No edema.      Left lower leg: No edema.   Skin:     General: Skin is warm and dry.   Neurological:      Mental Status: She is alert and oriented to person, place, and time.   Psychiatric:         Mood and Affect: Mood normal.         Behavior: Behavior normal.         Thought Content: Thought content normal.         Judgment: Judgment normal.                       Assessment and Plan   Diagnoses and all orders for this visit:    1. Medicare " annual wellness visit, subsequent (Primary)  -     CBC & Differential  -     Comprehensive Metabolic Panel  -     Lipid Panel  -     TSH Rfx On Abnormal To Free T4  -     Urinalysis With Culture If Indicated -  -     Pneumococcal Conjugate Vaccine 20-Valent (PCV20)  -     Zoster Vac Recomb Adjuvanted 50 MCG/0.5ML reconstituted suspension; Inject 0.5 mL into the appropriate muscle as directed by prescriber 1 (One) Time for 1 dose. 2nd dose 2-6 months later  Dispense: 1 each; Refill: 1    2. Sensation of pressure in bladder area  -     Ambulatory Referral to Urology  -     CBC & Differential  -     Comprehensive Metabolic Panel  -     Urinalysis With Culture If Indicated -    3. Urge and stress incontinence  -     Ambulatory Referral to Urology  -     Comprehensive Metabolic Panel  -     Urinalysis With Culture If Indicated -    4. Mixed hyperlipidemia  -     Comprehensive Metabolic Panel  -     Lipid Panel  -     TSH Rfx On Abnormal To Free T4    5. Fatigue, unspecified type  -     CBC & Differential  -     Comprehensive Metabolic Panel  -     Lipid Panel  -     TSH Rfx On Abnormal To Free T4  -     Urinalysis With Culture If Indicated -    6. Parkinson's disease  -     CBC & Differential  -     Comprehensive Metabolic Panel  -     Lipid Panel  -     TSH Rfx On Abnormal To Free T4  -     Urinalysis With Culture If Indicated -    7. Postmenopausal  -     DEXA Bone Density Axial; Future    8. Osteopenia, unspecified location  -     DEXA Bone Density Axial; Future    9. Screening mammogram for breast cancer  -     Mammo Screening Digital Tomosynthesis Bilateral With CAD; Future    10. Encounter for immunization  -     Pneumococcal Conjugate Vaccine 20-Valent (PCV20)  -     Zoster Vac Recomb Adjuvanted 50 MCG/0.5ML reconstituted suspension; Inject 0.5 mL into the appropriate muscle as directed by prescriber 1 (One) Time for 1 dose. 2nd dose 2-6 months later  Dispense: 1 each; Refill: 1    11. Overweight (BMI  25.0-29.9)  Comments:  counseled diet and exercise             Follow Up   Return if symptoms worsen or fail to improve.  Patient was given instructions and counseling regarding her condition or for health maintenance advice. Please see specific information pulled into the AVS if appropriate.

## 2023-07-26 NOTE — PROGRESS NOTES
Please mail letter to patient stating    Faith the urinalysis was abnormal with a small amount of blood and microscopically it shows 4+ bacteria and normally they go ahead and proceed with a culture reflex and if there is an antibiotic indicated we will let you know    And your comprehensive panel shows normal glucose and normal kidney and liver function test and normal electrolytes    Your cholesterol panel shows triglycerides 165 and should be less than 150 when fasting and the HDL was in very good range and the LDL was a little elevated at 135 and it should be less than 100 when fasting    All of your blood counts and your thyroid levels were normal range

## 2023-09-08 NOTE — PROGRESS NOTES
Chief Complaint: Urinary Incontinence    Subjective         History of Present Illness  Faith Alegria is a 68 y.o. female presents to [facility] to be seen for incontinence.    Patient presents reporting incontinence. She reports as soon as the urge hits she is already voiding. She reports she has never been treated for OAB.     Frequency- admits    Urgency- admits    Incontinence- admits with urgency    Nocturia- 1-2    GH- denies, but states she has been told she has a trace of blood    History of stones- denies     surgeries- bladder sling last was in 2001    Family history of  malignancy- denies    Cardiopulmonary- Parkinson    Anticoagulants- denies    Smoker- quit in 2022    Drinks mostly water, occasional juice    Hx of macular degeneration    Objective     Past Medical History:   Diagnosis Date    Arthritis     Cataract     CTS (carpal tunnel syndrome) 20010    GERD (gastroesophageal reflux disease)     GERD (gastroesophageal reflux disease) 08/17/2021    Hyperlipidemia     Loss of appetite 08/17/2021    Nausea 08/17/2021    Parkinson's disease     Sciatica     Thoracic or lumbosacral neuritis or radiculitis        Past Surgical History:   Procedure Laterality Date    BACK SURGERY      LOW BACK DISC    CARPAL TUNNEL RELEASE      CATARACT EXTRACTION      COLONOSCOPY      ENDOSCOPY      ENDOSCOPY N/A 01/19/2022    Procedure: ESOPHAGOGASTRODUODENOSCOPY WITH BIOPSIES;  Surgeon: Luis Enrique Valenzuela MD;  Location: [facility] ENDOSCOPY;  Service: Gastroenterology;  Laterality: N/A;  HIATAL HERNIA    FOOT SURGERY      HYSTERECTOMY      DUE TO CANCER    SHOULDER SURGERY      TUBAL ABDOMINAL LIGATION      UPPER GASTROINTESTINAL ENDOSCOPY         No current outpatient medications on file.    No Known Allergies     Family History   Problem Relation Age of Onset    Depression Mother     Hyperlipidemia Mother     Hypertension Mother     Alcohol abuse Father     Depression Sister      "Hyperlipidemia Sister     Hypertension Sister     Osteoporosis Sister     Depression Brother     Hypertension Brother     Alcohol abuse Brother     Alcohol abuse Maternal Grandfather     Alcohol abuse Paternal Grandfather     Colon cancer Paternal Uncle     Malig Hyperthermia Neg Hx        Social History     Socioeconomic History    Marital status:    Tobacco Use    Smoking status: Former     Packs/day: 0.00     Years: 20.00     Pack years: 0.00     Types: Cigarettes     Quit date: 2022     Years since quittin.6     Passive exposure: Past    Smokeless tobacco: Never    Tobacco comments:     2 cigarettes per day   Vaping Use    Vaping Use: Never used   Substance and Sexual Activity    Alcohol use: Never    Drug use: Never    Sexual activity: Yes     Partners: Male       Vital Signs:   Resp 16   Ht 144.8 cm (57\")   Wt 59.1 kg (130 lb 3.2 oz)   BMI 28.18 kg/m²      Physical Exam  Vitals reviewed.   Constitutional:       Appearance: Normal appearance.   Genitourinary:     Comments: Grade 2 bladder prolapse  Neurological:      General: No focal deficit present.      Mental Status: She is alert and oriented to person, place, and time.   Psychiatric:         Mood and Affect: Mood normal.         Behavior: Behavior normal.        Result Review :   The following data was reviewed by: VEL Isbell on 2023:  Results for orders placed or performed in visit on 23   Bladder Scan   Result Value Ref Range    Urine Volume 0    POC Urinalysis Dipstick, Automated    Specimen: Urine   Result Value Ref Range    Color Yellow Yellow, Straw, Dark Yellow, Chelsea    Clarity, UA Clear Clear    Specific Gravity  1.010 1.005 - 1.030    pH, Urine 7.0 5.0 - 8.0    Leukocytes Negative Negative    Nitrite, UA Negative Negative    Protein, POC Negative Negative mg/dL    Glucose, UA Negative Negative mg/dL    Ketones, UA Negative Negative    Urobilinogen, UA 0.2 E.U./dL Normal, 0.2 E.U./dL    Bilirubin " Negative Negative    Blood, UA Trace (A) Negative    Lot Number [id number]     Expiration Date 924         Results for orders placed or performed in visit on 09/12/23   Bladder Scan   Result Value Ref Range    Urine Volume 0    POC Urinalysis Dipstick, Automated    Specimen: Urine   Result Value Ref Range    Color Yellow Yellow, Straw, Dark Yellow, Chelsea    Clarity, UA Clear Clear    Specific Gravity  1.010 1.005 - 1.030    pH, Urine 7.0 5.0 - 8.0    Leukocytes Negative Negative    Nitrite, UA Negative Negative    Protein, POC Negative Negative mg/dL    Glucose, UA Negative Negative mg/dL    Ketones, UA Negative Negative    Urobilinogen, UA 0.2 E.U./dL Normal, 0.2 E.U./dL    Bilirubin Negative Negative    Blood, UA Trace (A) Negative    Lot Number [id number]     Expiration Date 924      Bladder Scan interpretation 09/12/2023    Estimation of residual urine via BVI 3000 [address] Bladder Scan  MA/nurse performing: Clare RAINEY MA  Residual Urine: 0 ml  Indication: Urinary incontinence, unspecified type    Hematuria, unspecified type    Female bladder prolapse    Overactive bladder   Position: Supine  Examination: Incremental scanning of the suprapubic area using 2.0 MHz transducer using copious amounts of acoustic gel.   Findings: An anechoic area was demonstrated which represented the bladder, with measurement of residual urine as noted. I inspected this myself. In that the residual urine was stable or insignificant, refer to plan for treatment and plan necessary at this time.           Procedures        Assessment and Plan    Diagnoses and all orders for this visit:    1. Urinary incontinence, unspecified type (Primary)  -     Bladder Scan  -     POC Urinalysis Dipstick, Automated    2. Hematuria, unspecified type  -     Urinalysis With Microscopic - Urine, Clean Catch; Future  -     Urinalysis With Microscopic - Urine, Clean Catch    3. Female bladder prolapse    4. Overactive bladder    Patient has not currently  interested in any medications to treat overactive bladder given her history of Parkinson's disease as well as macular degeneration she does not want to take any risk of constipation or any problems with her eyes.  She reports she would not be able to afford any of the newer more expensive medications so does not feel like this is a good option for her as well.    I will place a referral to urogynecology for her bladder prolapse in hopes that it will least help with her incontinence if she has this repaired.    We will send her urine for microscopy today.  She did have microscopy done in July that showed 0-2 red blood cells, so just want to verify that this is accurate.    She will follow-up with urogynecology.  Follow Up   No follow-ups on file.  Patient was given instructions and counseling regarding her condition or for health maintenance advice. Please see specific information pulled into the AVS if appropriate.         This document has been electronically signed by VEL Isbell  September 12, 2023 10:16 EDT

## 2023-09-12 ENCOUNTER — OFFICE VISIT (OUTPATIENT)
Dept: UROLOGY | Facility: CLINIC | Age: 68
End: 2023-09-12
Payer: MEDICARE

## 2023-09-12 VITALS — HEIGHT: 57 IN | RESPIRATION RATE: 16 BRPM | WEIGHT: 130.2 LBS | BODY MASS INDEX: 28.09 KG/M2

## 2023-09-12 DIAGNOSIS — N81.10 FEMALE BLADDER PROLAPSE: ICD-10-CM

## 2023-09-12 DIAGNOSIS — R31.9 HEMATURIA, UNSPECIFIED TYPE: ICD-10-CM

## 2023-09-12 DIAGNOSIS — N32.81 OVERACTIVE BLADDER: ICD-10-CM

## 2023-09-12 DIAGNOSIS — R32 URINARY INCONTINENCE, UNSPECIFIED TYPE: Primary | ICD-10-CM

## 2023-09-12 LAB
BACTERIA UR QL AUTO: NORMAL /HPF
BILIRUB BLD-MCNC: NEGATIVE MG/DL
BILIRUB UR QL STRIP: NEGATIVE
CLARITY UR: CLEAR
CLARITY, POC: CLEAR
COLOR UR: YELLOW
COLOR UR: YELLOW
EXPIRATION DATE: 924
GLUCOSE UR STRIP-MCNC: NEGATIVE MG/DL
GLUCOSE UR STRIP-MCNC: NEGATIVE MG/DL
HGB UR QL STRIP.AUTO: NEGATIVE
HYALINE CASTS UR QL AUTO: NORMAL /LPF
KETONES UR QL STRIP: NEGATIVE
KETONES UR QL: NEGATIVE
LEUKOCYTE EST, POC: NEGATIVE
LEUKOCYTE ESTERASE UR QL STRIP.AUTO: NEGATIVE
Lab: ABNORMAL
NITRITE UR QL STRIP: NEGATIVE
NITRITE UR-MCNC: NEGATIVE MG/ML
PH UR STRIP.AUTO: 7 [PH] (ref 5–8)
PH UR: 7 [PH] (ref 5–8)
PROT UR QL STRIP: NEGATIVE
PROT UR STRIP-MCNC: NEGATIVE MG/DL
RBC # UR STRIP: ABNORMAL /UL
RBC # UR STRIP: NORMAL /HPF
REF LAB TEST METHOD: NORMAL
SP GR UR STRIP: <=1.005 (ref 1–1.03)
SP GR UR: 1.01 (ref 1–1.03)
SQUAMOUS #/AREA URNS HPF: NORMAL /HPF
URINE VOLUME: 0
UROBILINOGEN UR QL STRIP: NORMAL
UROBILINOGEN UR QL: ABNORMAL
WBC # UR STRIP: NORMAL /HPF

## 2023-09-12 PROCEDURE — 1159F MED LIST DOCD IN RCRD: CPT | Performed by: NURSE PRACTITIONER

## 2023-09-12 PROCEDURE — 1160F RVW MEDS BY RX/DR IN RCRD: CPT | Performed by: NURSE PRACTITIONER

## 2023-09-12 PROCEDURE — 99203 OFFICE O/P NEW LOW 30 MIN: CPT | Performed by: NURSE PRACTITIONER

## 2023-09-12 PROCEDURE — 81003 URINALYSIS AUTO W/O SCOPE: CPT | Performed by: NURSE PRACTITIONER

## 2023-09-12 PROCEDURE — 81001 URINALYSIS AUTO W/SCOPE: CPT | Performed by: NURSE PRACTITIONER

## 2023-09-13 ENCOUNTER — HOSPITAL ENCOUNTER (OUTPATIENT)
Dept: MAMMOGRAPHY | Facility: HOSPITAL | Age: 68
Discharge: HOME | End: 2023-09-13
Payer: MEDICARE

## 2023-09-13 ENCOUNTER — HOSPITAL ENCOUNTER (OUTPATIENT)
Dept: BONE DENSITY | Facility: HOSPITAL | Age: 68
Discharge: HOME | End: 2023-09-13
Payer: MEDICARE

## 2023-09-13 ENCOUNTER — TELEPHONE (OUTPATIENT)
Dept: UROLOGY | Facility: CLINIC | Age: 68
End: 2023-09-13
Payer: MEDICARE

## 2023-09-13 DIAGNOSIS — Z12.31 SCREENING MAMMOGRAM FOR BREAST CANCER: ICD-10-CM

## 2023-09-13 DIAGNOSIS — M85.80 OSTEOPENIA, UNSPECIFIED LOCATION: ICD-10-CM

## 2023-09-13 DIAGNOSIS — Z78.0 POSTMENOPAUSAL: ICD-10-CM

## 2023-09-13 PROCEDURE — 77080 DXA BONE DENSITY AXIAL: CPT

## 2023-09-13 PROCEDURE — 77067 SCR MAMMO BI INCL CAD: CPT

## 2023-09-13 PROCEDURE — 77063 BREAST TOMOSYNTHESIS BI: CPT

## 2023-09-13 NOTE — PROGRESS NOTES
Please mail letter to patient stating    aFith the mammogram was read as negative/benign and for you to continue doing your yearly mammographic screenings and I would like you to continue doing your monthly self breast exams please

## 2023-09-13 NOTE — PROGRESS NOTES
Please mail letter to patient stating    Faith the bone density shows osteopenia and no signs of osteoporosis at this time and for now we would just advise you to continue with good weightbearing exercises like walking 30 to 60 minutes daily and good calcium with vitamin D intake on a daily basis with a calcium through your diet and supplements together equaling approximately 1200 to 1500 mg daily

## 2023-09-13 NOTE — TELEPHONE ENCOUNTER
----- Message from VEL Isbell sent at 9/13/2023  7:51 AM EDT -----  Please advise patient that her urine shows 0-2 red blood cells which is not a cause for concern.

## 2023-11-16 ENCOUNTER — TRANSCRIBE ORDERS (OUTPATIENT)
Dept: ADMINISTRATIVE | Facility: HOSPITAL | Age: 68
End: 2023-11-16
Payer: MEDICARE

## 2023-11-16 ENCOUNTER — TELEPHONE (OUTPATIENT)
Dept: UROLOGY | Facility: CLINIC | Age: 68
End: 2023-11-16

## 2023-11-16 DIAGNOSIS — R31.29 MICROSCOPIC HEMATURIA: Primary | ICD-10-CM

## 2023-11-16 NOTE — TELEPHONE ENCOUNTER
Caller: IFTIKHAR HODGES    Relationship to patient: SELF    Best call back number: [phone]    Chief complaint: PT WAS INSTRUCTED TO GET A CT ABDOMEN PELVIS WITH AND WITHOUT CONTRAST. PT IS CONFUSED WHETHER OFFICE IS SUPPOSED TO SET THIS UP FOR HER OR IF SHE IS. THERE ARE NO ORDERS FOR CT SCAN IN CHART, PT MENTIONED SOMETHING ABOUT HAVING TO PRINT HER ORDER, BUT IS UNSURE.    Type of visit: IMAGING

## 2023-12-04 ENCOUNTER — OFFICE VISIT (OUTPATIENT)
Dept: FAMILY MEDICINE CLINIC | Facility: CLINIC | Age: 68
End: 2023-12-04
Payer: MEDICARE

## 2023-12-04 VITALS
TEMPERATURE: 97.9 F | BODY MASS INDEX: 28.26 KG/M2 | HEART RATE: 98 BPM | SYSTOLIC BLOOD PRESSURE: 130 MMHG | HEIGHT: 57 IN | DIASTOLIC BLOOD PRESSURE: 80 MMHG | OXYGEN SATURATION: 96 % | WEIGHT: 131 LBS

## 2023-12-04 DIAGNOSIS — J06.9 UPPER RESPIRATORY TRACT INFECTION, UNSPECIFIED TYPE: ICD-10-CM

## 2023-12-04 DIAGNOSIS — J02.9 PHARYNGITIS, UNSPECIFIED ETIOLOGY: Primary | ICD-10-CM

## 2023-12-04 DIAGNOSIS — R05.9 COUGH, UNSPECIFIED TYPE: ICD-10-CM

## 2023-12-04 PROCEDURE — 1159F MED LIST DOCD IN RCRD: CPT | Performed by: FAMILY MEDICINE

## 2023-12-04 PROCEDURE — 99213 OFFICE O/P EST LOW 20 MIN: CPT | Performed by: FAMILY MEDICINE

## 2023-12-04 PROCEDURE — 1160F RVW MEDS BY RX/DR IN RCRD: CPT | Performed by: FAMILY MEDICINE

## 2023-12-04 RX ORDER — SACCHAROMYCES BOULARDII 250 MG
250 CAPSULE ORAL 2 TIMES DAILY
Qty: 20 CAPSULE | Refills: 0 | Status: SHIPPED | OUTPATIENT
Start: 2023-12-04 | End: 2023-12-14

## 2023-12-04 RX ORDER — CEFDINIR 300 MG/1
300 CAPSULE ORAL 2 TIMES DAILY
Qty: 14 CAPSULE | Refills: 0 | Status: SHIPPED | OUTPATIENT
Start: 2023-12-04 | End: 2023-12-11

## 2023-12-04 RX ORDER — PREDNISONE 20 MG/1
60 TABLET ORAL DAILY
Qty: 15 TABLET | Refills: 0 | Status: SHIPPED | OUTPATIENT
Start: 2023-12-04 | End: 2023-12-09

## 2023-12-04 NOTE — PROGRESS NOTES
Chief Complaint  Cough (X4 weeks) and Wheezing    Subjective          Faith ANGEL Alegria presents to [facility]  URI   This is a new problem. Episode onset: 3-4 weeks. The problem has been unchanged. There has been no fever. Associated symptoms include congestion, coughing and a sore throat. Pertinent negatives include no abdominal pain, chest pain, diarrhea, dysuria, ear pain, headaches, joint pain, joint swelling, nausea, neck pain, plugged ear sensation, rash, rhinorrhea, sinus pain, sneezing, swollen glands, vomiting or wheezing. She has tried nothing for the symptoms.                Objective   No Known Allergies  Immunization History   Administered Date(s) Administered    COVID-19 (PFIZER) Purple Cap Monovalent 04/24/2021, 05/15/2021, 11/23/2021, 07/25/2022    FLUAD TRI 65YR+ 01/07/2014    Flu Vaccine Split Quad 11/17/2015, 10/11/2016    Fluzone (or Fluarix & Flulaval for VFC) >6mos 11/17/2015, 10/11/2016    Fluzone High-Dose 65+yrs 10/25/2021    Fluzone Quad >6mos (Multi-dose) 10/23/2018    Influenza Seasonal Injectable 01/07/2014    Influenza, Unspecified 10/15/2020    Pneumococcal Conjugate 20-Valent (PCV20) 07/25/2023    Pneumococcal Polysaccharide (PPSV23) 07/23/2019    Tdap 07/19/2017     Past Medical History:   Diagnosis Date    Arthritis     Cataract     CTS (carpal tunnel syndrome) 20010    GERD (gastroesophageal reflux disease)     GERD (gastroesophageal reflux disease) 08/17/2021    Hyperlipidemia     Loss of appetite 08/17/2021    Nausea 08/17/2021    Parkinson's disease     Sciatica     Thoracic or lumbosacral neuritis or radiculitis       Past Surgical History:   Procedure Laterality Date    BACK SURGERY      LOW BACK DISC    CARPAL TUNNEL RELEASE      CATARACT EXTRACTION      COLONOSCOPY      ENDOSCOPY      ENDOSCOPY N/A 01/19/2022    Procedure: ESOPHAGOGASTRODUODENOSCOPY WITH BIOPSIES;  Surgeon: Luis Enrique Valenzuela MD;  Location: [facility] ENDOSCOPY;   "Service: Gastroenterology;  Laterality: N/A;  HIATAL HERNIA    FOOT SURGERY      HYSTERECTOMY      DUE TO CANCER    SHOULDER SURGERY      TUBAL ABDOMINAL LIGATION      UPPER GASTROINTESTINAL ENDOSCOPY        Social History     Socioeconomic History    Marital status:    Tobacco Use    Smoking status: Former     Packs/day: 0.00     Years: 20.00     Additional pack years: 0.00     Total pack years: 0.00     Types: Cigarettes     Quit date: 2022     Years since quittin.8     Passive exposure: Past    Smokeless tobacco: Never    Tobacco comments:     2 cigarettes per day   Vaping Use    Vaping Use: Never used   Substance and Sexual Activity    Alcohol use: Never    Drug use: Never    Sexual activity: Yes     Partners: Male        Current Outpatient Medications:     cefdinir (OMNICEF) 300 MG capsule, Take 1 capsule by mouth 2 (Two) Times a Day for 7 days., Disp: 14 capsule, Rfl: 0    predniSONE (DELTASONE) 20 MG tablet, Take 3 tablets by mouth Daily for 5 days., Disp: 15 tablet, Rfl: 0    saccharomyces boulardii (Florastor) 250 MG capsule, Take 1 capsule by mouth 2 (Two) Times a Day for 10 days., Disp: 20 capsule, Rfl: 0   Family History   Problem Relation Age of Onset    Depression Mother     Hyperlipidemia Mother     Hypertension Mother     Alcohol abuse Father     Depression Sister     Hyperlipidemia Sister     Hypertension Sister     Osteoporosis Sister     Depression Brother     Hypertension Brother     Alcohol abuse Brother     Alcohol abuse Maternal Grandfather     Alcohol abuse Paternal Grandfather     Colon cancer Paternal Uncle     Malig Hyperthermia Neg Hx           Vital Signs:   Vitals:    23 1531   BP: 130/80   BP Location: Left arm   Patient Position: Sitting   Cuff Size: Adult   Pulse: 98   Temp: 97.9 °F (36.6 °C)   SpO2: 96%   Weight: 59.4 kg (131 lb)   Height: 144.8 cm (57\")       Review of Systems   HENT:  Positive for congestion and sore throat. Negative for ear pain, rhinorrhea, " sinus pain and sneezing.    Respiratory:  Positive for cough. Negative for wheezing.    Cardiovascular:  Negative for chest pain.   Gastrointestinal:  Negative for abdominal pain, diarrhea, nausea and vomiting.   Genitourinary:  Negative for dysuria.   Musculoskeletal:  Negative for joint pain and neck pain.   Skin:  Negative for rash.   Neurological:  Negative for headaches.      Physical Exam  Vitals reviewed.   Constitutional:       Appearance: Normal appearance. She is well-developed.   HENT:      Head: Normocephalic and atraumatic.      Right Ear: Tympanic membrane, ear canal and external ear normal.      Left Ear: Tympanic membrane, ear canal and external ear normal.      Nose: Nose normal.      Mouth/Throat:      Mouth: Mucous membranes are moist.      Pharynx: Oropharynx is clear. Posterior oropharyngeal erythema present. No oropharyngeal exudate.   Eyes:      Conjunctiva/sclera: Conjunctivae normal.      Pupils: Pupils are equal, round, and reactive to light.   Cardiovascular:      Rate and Rhythm: Normal rate and regular rhythm.      Pulses: Normal pulses.      Heart sounds: Normal heart sounds. No murmur heard.     No friction rub. No gallop.   Pulmonary:      Effort: Pulmonary effort is normal.      Breath sounds: Normal breath sounds. No wheezing or rhonchi.   Abdominal:      General: Abdomen is flat. Bowel sounds are normal. There is no distension.      Palpations: Abdomen is soft. There is no mass.      Tenderness: There is no abdominal tenderness. There is no guarding or rebound.      Hernia: No hernia is present.   Musculoskeletal:         General: Normal range of motion.      Cervical back: Normal range of motion and neck supple.   Skin:     General: Skin is warm and dry.      Capillary Refill: Capillary refill takes less than 2 seconds.   Neurological:      General: No focal deficit present.      Mental Status: She is alert and oriented to person, place, and time.      Cranial Nerves: No cranial  nerve deficit.   Psychiatric:         Mood and Affect: Mood and affect normal.         Behavior: Behavior normal.         Thought Content: Thought content normal.         Judgment: Judgment normal.        Result Review :   The following data was reviewed by: Yves Shen MD on 12/04/2023:    Data reviewed : Radiologic studies I viewed and interpreted 2 views chest x-rays:NAD.           Assessment and Plan    Diagnoses and all orders for this visit:    1. Pharyngitis, unspecified etiology (Primary)  -     cefdinir (OMNICEF) 300 MG capsule; Take 1 capsule by mouth 2 (Two) Times a Day for 7 days.  Dispense: 14 capsule; Refill: 0  -     saccharomyces boulardii (Florastor) 250 MG capsule; Take 1 capsule by mouth 2 (Two) Times a Day for 10 days.  Dispense: 20 capsule; Refill: 0  -     predniSONE (DELTASONE) 20 MG tablet; Take 3 tablets by mouth Daily for 5 days.  Dispense: 15 tablet; Refill: 0    2. Cough, unspecified type  -     XR Chest PA & Lateral (In Office)    3. Upper respiratory tract infection, unspecified type  -     cefdinir (OMNICEF) 300 MG capsule; Take 1 capsule by mouth 2 (Two) Times a Day for 7 days.  Dispense: 14 capsule; Refill: 0  -     saccharomyces boulardii (Florastor) 250 MG capsule; Take 1 capsule by mouth 2 (Two) Times a Day for 10 days.  Dispense: 20 capsule; Refill: 0  -     predniSONE (DELTASONE) 20 MG tablet; Take 3 tablets by mouth Daily for 5 days.  Dispense: 15 tablet; Refill: 0            Follow Up   Return if symptoms worsen or fail to improve.  Patient was given instructions and counseling regarding her condition or for health maintenance advice. Please see specific information pulled into the AVS if appropriate.

## 2023-12-26 ENCOUNTER — HOSPITAL ENCOUNTER (OUTPATIENT)
Dept: CT IMAGING | Facility: HOSPITAL | Age: 68
Discharge: HOME | End: 2023-12-26
Admitting: OBSTETRICS & GYNECOLOGY
Payer: MEDICARE

## 2023-12-26 DIAGNOSIS — R31.29 MICROSCOPIC HEMATURIA: ICD-10-CM

## 2023-12-26 LAB
CREAT BLDA-MCNC: 0.7 MG/DL (ref 0.6–1.3)
EGFRCR SERPLBLD CKD-EPI 2021: 93.8 ML/MIN/1.73

## 2023-12-26 PROCEDURE — 82565 ASSAY OF CREATININE: CPT

## 2023-12-26 PROCEDURE — 74178 CT ABD&PLV WO CNTR FLWD CNTR: CPT

## 2023-12-26 PROCEDURE — 25510000001 IOPAMIDOL PER 1 ML: Performed by: OBSTETRICS & GYNECOLOGY

## 2023-12-26 RX ADMIN — IOPAMIDOL 100 ML: 755 INJECTION, SOLUTION INTRAVENOUS at 10:44

## 2024-08-05 ENCOUNTER — OFFICE VISIT (OUTPATIENT)
Dept: FAMILY MEDICINE CLINIC | Facility: CLINIC | Age: 69
End: 2024-08-05
Payer: MEDICARE

## 2024-08-05 VITALS
HEIGHT: 57 IN | BODY MASS INDEX: 27.83 KG/M2 | SYSTOLIC BLOOD PRESSURE: 126 MMHG | DIASTOLIC BLOOD PRESSURE: 72 MMHG | OXYGEN SATURATION: 97 % | WEIGHT: 129 LBS | TEMPERATURE: 97.1 F | HEART RATE: 90 BPM

## 2024-08-05 DIAGNOSIS — Z00.00 MEDICARE ANNUAL WELLNESS VISIT, SUBSEQUENT: Primary | ICD-10-CM

## 2024-08-05 DIAGNOSIS — E78.2 MIXED HYPERLIPIDEMIA: ICD-10-CM

## 2024-08-05 DIAGNOSIS — R53.83 FATIGUE, UNSPECIFIED TYPE: ICD-10-CM

## 2024-08-05 DIAGNOSIS — Z71.85 IMMUNIZATION COUNSELING: ICD-10-CM

## 2024-08-05 PROCEDURE — 1160F RVW MEDS BY RX/DR IN RCRD: CPT | Performed by: NURSE PRACTITIONER

## 2024-08-05 PROCEDURE — 1159F MED LIST DOCD IN RCRD: CPT | Performed by: NURSE PRACTITIONER

## 2024-08-05 PROCEDURE — 1170F FXNL STATUS ASSESSED: CPT | Performed by: NURSE PRACTITIONER

## 2024-08-05 PROCEDURE — G0439 PPPS, SUBSEQ VISIT: HCPCS | Performed by: NURSE PRACTITIONER

## 2024-08-05 PROCEDURE — 1126F AMNT PAIN NOTED NONE PRSNT: CPT | Performed by: NURSE PRACTITIONER

## 2024-08-05 NOTE — ASSESSMENT & PLAN NOTE
Lipid abnormalities are improving with lifestyle modifications    Plan:  Lipid lowering therapy not prescribed due to pt does grilled chicken and more vegetarian diet     Counseled patient on lifestyle modifications to help control hyperlipidemia.     Patient Treatment Goals:   LDL goal is less than 70    Followup PRN .
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
## 2024-08-26 ENCOUNTER — CLINICAL SUPPORT (OUTPATIENT)
Dept: FAMILY MEDICINE CLINIC | Facility: CLINIC | Age: 69
End: 2024-08-26
Payer: MEDICARE

## 2024-08-26 DIAGNOSIS — E78.2 MIXED HYPERLIPIDEMIA: Primary | ICD-10-CM

## 2024-08-26 LAB
ALBUMIN SERPL-MCNC: 4.1 G/DL (ref 3.5–5.2)
ALBUMIN/GLOB SERPL: 1.7 G/DL
ALP SERPL-CCNC: 107 U/L (ref 39–117)
ALT SERPL W P-5'-P-CCNC: 7 U/L (ref 1–33)
ANION GAP SERPL CALCULATED.3IONS-SCNC: 8.8 MMOL/L (ref 5–15)
AST SERPL-CCNC: 13 U/L (ref 1–32)
BILIRUB SERPL-MCNC: 0.3 MG/DL (ref 0–1.2)
BUN SERPL-MCNC: 9 MG/DL (ref 8–23)
BUN/CREAT SERPL: 12.7 (ref 7–25)
CALCIUM SPEC-SCNC: 10.3 MG/DL (ref 8.6–10.5)
CHLORIDE SERPL-SCNC: 107 MMOL/L (ref 98–107)
CHOLEST SERPL-MCNC: 220 MG/DL (ref 0–200)
CO2 SERPL-SCNC: 28.2 MMOL/L (ref 22–29)
CREAT SERPL-MCNC: 0.71 MG/DL (ref 0.57–1)
DEPRECATED RDW RBC AUTO: 42.7 FL (ref 37–54)
EGFRCR SERPLBLD CKD-EPI 2021: 91.6 ML/MIN/1.73
ERYTHROCYTE [DISTWIDTH] IN BLOOD BY AUTOMATED COUNT: 12.6 % (ref 12.3–15.4)
GLOBULIN UR ELPH-MCNC: 2.4 GM/DL
GLUCOSE SERPL-MCNC: 101 MG/DL (ref 65–99)
HCT VFR BLD AUTO: 43.8 % (ref 34–46.6)
HDLC SERPL-MCNC: 51 MG/DL (ref 40–60)
HGB BLD-MCNC: 14.5 G/DL (ref 12–15.9)
LDLC SERPL CALC-MCNC: 143 MG/DL (ref 0–100)
LDLC/HDLC SERPL: 2.74 {RATIO}
MCH RBC QN AUTO: 31 PG (ref 26.6–33)
MCHC RBC AUTO-ENTMCNC: 33.1 G/DL (ref 31.5–35.7)
MCV RBC AUTO: 93.6 FL (ref 79–97)
PLATELET # BLD AUTO: 284 10*3/MM3 (ref 140–450)
PMV BLD AUTO: 9.8 FL (ref 6–12)
POTASSIUM SERPL-SCNC: 4.6 MMOL/L (ref 3.5–5.2)
PROT SERPL-MCNC: 6.5 G/DL (ref 6–8.5)
RBC # BLD AUTO: 4.68 10*6/MM3 (ref 3.77–5.28)
SODIUM SERPL-SCNC: 144 MMOL/L (ref 136–145)
TRIGL SERPL-MCNC: 147 MG/DL (ref 0–150)
TSH SERPL DL<=0.05 MIU/L-ACNC: 2.04 UIU/ML (ref 0.27–4.2)
VLDLC SERPL-MCNC: 26 MG/DL (ref 5–40)
WBC NRBC COR # BLD AUTO: 9.64 10*3/MM3 (ref 3.4–10.8)

## 2024-08-26 PROCEDURE — 85027 COMPLETE CBC AUTOMATED: CPT | Performed by: NURSE PRACTITIONER

## 2024-08-26 PROCEDURE — 84443 ASSAY THYROID STIM HORMONE: CPT | Performed by: NURSE PRACTITIONER

## 2024-08-26 PROCEDURE — 36415 COLL VENOUS BLD VENIPUNCTURE: CPT | Performed by: NURSE PRACTITIONER

## 2024-08-26 PROCEDURE — 80061 LIPID PANEL: CPT | Performed by: NURSE PRACTITIONER

## 2024-08-26 PROCEDURE — 80053 COMPREHEN METABOLIC PANEL: CPT | Performed by: NURSE PRACTITIONER

## 2024-08-26 NOTE — PROGRESS NOTES
Venipuncture Blood Specimen Collection  Venipuncture performed in left arm by Madison Barone with good hemostasis. Patient tolerated the procedure well without complications.   08/26/24   Madison Barone

## 2024-10-24 ENCOUNTER — APPOINTMENT (OUTPATIENT)
Dept: GENERAL RADIOLOGY | Facility: HOSPITAL | Age: 69
End: 2024-10-24
Payer: MEDICARE

## 2024-10-24 ENCOUNTER — APPOINTMENT (OUTPATIENT)
Dept: CT IMAGING | Facility: HOSPITAL | Age: 69
End: 2024-10-24
Payer: MEDICARE

## 2024-10-24 ENCOUNTER — HOSPITAL ENCOUNTER (EMERGENCY)
Facility: HOSPITAL | Age: 69
Discharge: HOME | End: 2024-10-24
Attending: EMERGENCY MEDICINE | Admitting: EMERGENCY MEDICINE
Payer: MEDICARE

## 2024-10-24 VITALS
HEIGHT: 57 IN | RESPIRATION RATE: 20 BRPM | WEIGHT: 132.5 LBS | DIASTOLIC BLOOD PRESSURE: 59 MMHG | OXYGEN SATURATION: 92 % | HEART RATE: 65 BPM | SYSTOLIC BLOOD PRESSURE: 149 MMHG | BODY MASS INDEX: 28.59 KG/M2 | TEMPERATURE: 98 F

## 2024-10-24 DIAGNOSIS — M54.12 ACUTE CERVICAL RADICULOPATHY: Primary | ICD-10-CM

## 2024-10-24 LAB
ALBUMIN SERPL-MCNC: 4.2 G/DL (ref 3.5–5.2)
ALBUMIN/GLOB SERPL: 1.6 G/DL
ALP SERPL-CCNC: 120 U/L (ref 39–117)
ALT SERPL W P-5'-P-CCNC: 8 U/L (ref 1–33)
ANION GAP SERPL CALCULATED.3IONS-SCNC: 13.4 MMOL/L (ref 5–15)
AST SERPL-CCNC: 14 U/L (ref 1–32)
BASOPHILS # BLD AUTO: 0.05 10*3/MM3 (ref 0–0.2)
BASOPHILS NFR BLD AUTO: 0.4 % (ref 0–1.5)
BILIRUB SERPL-MCNC: 0.3 MG/DL (ref 0–1.2)
BUN SERPL-MCNC: 10 MG/DL (ref 8–23)
BUN/CREAT SERPL: 14.1 (ref 7–25)
CALCIUM SPEC-SCNC: 10 MG/DL (ref 8.6–10.5)
CHLORIDE SERPL-SCNC: 105 MMOL/L (ref 98–107)
CO2 SERPL-SCNC: 23.6 MMOL/L (ref 22–29)
CREAT SERPL-MCNC: 0.71 MG/DL (ref 0.57–1)
DEPRECATED RDW RBC AUTO: 44.4 FL (ref 37–54)
EGFRCR SERPLBLD CKD-EPI 2021: 91.6 ML/MIN/1.73
EOSINOPHIL # BLD AUTO: 0.07 10*3/MM3 (ref 0–0.4)
EOSINOPHIL NFR BLD AUTO: 0.6 % (ref 0.3–6.2)
ERYTHROCYTE [DISTWIDTH] IN BLOOD BY AUTOMATED COUNT: 13 % (ref 12.3–15.4)
GEN 5 2HR TROPONIN T REFLEX: <6 NG/L
GLOBULIN UR ELPH-MCNC: 2.6 GM/DL
GLUCOSE SERPL-MCNC: 129 MG/DL (ref 65–99)
HCT VFR BLD AUTO: 43.8 % (ref 34–46.6)
HGB BLD-MCNC: 14.7 G/DL (ref 12–15.9)
HOLD SPECIMEN: NORMAL
IMM GRANULOCYTES # BLD AUTO: 0.05 10*3/MM3 (ref 0–0.05)
IMM GRANULOCYTES NFR BLD AUTO: 0.4 % (ref 0–0.5)
LYMPHOCYTES # BLD AUTO: 1.81 10*3/MM3 (ref 0.7–3.1)
LYMPHOCYTES NFR BLD AUTO: 16.1 % (ref 19.6–45.3)
MCH RBC QN AUTO: 31.2 PG (ref 26.6–33)
MCHC RBC AUTO-ENTMCNC: 33.6 G/DL (ref 31.5–35.7)
MCV RBC AUTO: 93 FL (ref 79–97)
MONOCYTES # BLD AUTO: 0.56 10*3/MM3 (ref 0.1–0.9)
MONOCYTES NFR BLD AUTO: 5 % (ref 5–12)
NEUTROPHILS NFR BLD AUTO: 77.5 % (ref 42.7–76)
NEUTROPHILS NFR BLD AUTO: 8.69 10*3/MM3 (ref 1.7–7)
NRBC BLD AUTO-RTO: 0 /100 WBC (ref 0–0.2)
PLATELET # BLD AUTO: 282 10*3/MM3 (ref 140–450)
PMV BLD AUTO: 9.4 FL (ref 6–12)
POTASSIUM SERPL-SCNC: 4.2 MMOL/L (ref 3.5–5.2)
PROT SERPL-MCNC: 6.8 G/DL (ref 6–8.5)
QT INTERVAL: 407 MS
QTC INTERVAL: 447 MS
RBC # BLD AUTO: 4.71 10*6/MM3 (ref 3.77–5.28)
SODIUM SERPL-SCNC: 142 MMOL/L (ref 136–145)
TROPONIN T DELTA: NORMAL
TROPONIN T SERPL HS-MCNC: <6 NG/L
WBC NRBC COR # BLD AUTO: 11.23 10*3/MM3 (ref 3.4–10.8)
WHOLE BLOOD HOLD COAG: NORMAL

## 2024-10-24 PROCEDURE — 99284 EMERGENCY DEPT VISIT MOD MDM: CPT

## 2024-10-24 PROCEDURE — 36415 COLL VENOUS BLD VENIPUNCTURE: CPT

## 2024-10-24 PROCEDURE — 96375 TX/PRO/DX INJ NEW DRUG ADDON: CPT

## 2024-10-24 PROCEDURE — 70450 CT HEAD/BRAIN W/O DYE: CPT

## 2024-10-24 PROCEDURE — 93010 ELECTROCARDIOGRAM REPORT: CPT | Performed by: INTERNAL MEDICINE

## 2024-10-24 PROCEDURE — 84484 ASSAY OF TROPONIN QUANT: CPT | Performed by: EMERGENCY MEDICINE

## 2024-10-24 PROCEDURE — 80053 COMPREHEN METABOLIC PANEL: CPT | Performed by: EMERGENCY MEDICINE

## 2024-10-24 PROCEDURE — 25010000002 ONDANSETRON PER 1 MG: Performed by: EMERGENCY MEDICINE

## 2024-10-24 PROCEDURE — 85025 COMPLETE CBC W/AUTO DIFF WBC: CPT | Performed by: EMERGENCY MEDICINE

## 2024-10-24 PROCEDURE — 71045 X-RAY EXAM CHEST 1 VIEW: CPT

## 2024-10-24 PROCEDURE — 93005 ELECTROCARDIOGRAM TRACING: CPT | Performed by: EMERGENCY MEDICINE

## 2024-10-24 PROCEDURE — 25010000002 MORPHINE PER 10 MG: Performed by: EMERGENCY MEDICINE

## 2024-10-24 PROCEDURE — 96374 THER/PROPH/DIAG INJ IV PUSH: CPT

## 2024-10-24 RX ORDER — PREDNISONE 20 MG/1
40 TABLET ORAL DAILY
Qty: 10 TABLET | Refills: 0 | Status: SHIPPED | OUTPATIENT
Start: 2024-10-24 | End: 2024-10-29

## 2024-10-24 RX ORDER — OXYCODONE AND ACETAMINOPHEN 5; 325 MG/1; MG/1
1 TABLET ORAL EVERY 6 HOURS PRN
Qty: 12 TABLET | Refills: 0 | Status: SHIPPED | OUTPATIENT
Start: 2024-10-24

## 2024-10-24 RX ORDER — ONDANSETRON 2 MG/ML
4 INJECTION INTRAMUSCULAR; INTRAVENOUS ONCE
Status: COMPLETED | OUTPATIENT
Start: 2024-10-24 | End: 2024-10-24

## 2024-10-24 RX ADMIN — MORPHINE SULFATE 4 MG: 4 INJECTION, SOLUTION INTRAMUSCULAR; INTRAVENOUS at 05:50

## 2024-10-24 RX ADMIN — ONDANSETRON 4 MG: 2 INJECTION, SOLUTION INTRAMUSCULAR; INTRAVENOUS at 05:50

## 2024-10-24 NOTE — ED PROVIDER NOTES
Time: 3:15 AM EDT  Date of encounter:  10/24/2024  Independent Historian/Clinical History and Information was obtained by:   Patient    History is limited by: N/A    Chief Complaint: Neck and shoulder pain      History of Present Illness:  Patient is a 69 y.o. year old female who presents to the emergency department for evaluation of neck and shoulder pain    Patient describes sudden onset of intermittent severe pain in her posterior neck and scalp with radiation down her right shoulder and down her right arm.  She states it is very severe and painful when it occurs.  She states it waxes and wanes.  She denies any positional or movement exacerbation to the pain.  She attempted treatment at home with ibuprofen and hydrocodone without relief.  She denies fall or trauma.  She denies fevers or chills.  She denies any associated chest pain shortness of breath nausea or vomiting.      Patient Care Team  Primary Care Provider: Rosalba Smith APRN    Past Medical History:     No Known Allergies  Past Medical History:   Diagnosis Date    Arthritis     Cataract     CTS (carpal tunnel syndrome) 20010    GERD (gastroesophageal reflux disease)     GERD (gastroesophageal reflux disease) 08/17/2021    Hyperlipidemia     Loss of appetite 08/17/2021    Nausea 08/17/2021    Parkinson's disease     Sciatica     Thoracic or lumbosacral neuritis or radiculitis      Past Surgical History:   Procedure Laterality Date    BACK SURGERY      LOW BACK DISC    CARPAL TUNNEL RELEASE      CATARACT EXTRACTION      COLONOSCOPY      ENDOSCOPY      ENDOSCOPY N/A 01/19/2022    Procedure: ESOPHAGOGASTRODUODENOSCOPY WITH BIOPSIES;  Surgeon: Luis Enrique Valenzuela MD;  Location: [facility] ENDOSCOPY;  Service: Gastroenterology;  Laterality: N/A;  HIATAL HERNIA    FOOT SURGERY      HYSTERECTOMY      DUE TO CANCER    SHOULDER SURGERY      TUBAL ABDOMINAL LIGATION      UPPER GASTROINTESTINAL ENDOSCOPY       Family History   Problem Relation Age of Onset  "   Depression Mother     Hyperlipidemia Mother     Hypertension Mother     Alcohol abuse Father     Depression Sister     Hyperlipidemia Sister     Hypertension Sister     Osteoporosis Sister     Depression Brother     Hypertension Brother     Alcohol abuse Brother     Alcohol abuse Maternal Grandfather     Alcohol abuse Paternal Grandfather     Colon cancer Paternal Uncle     Malig Hyperthermia Neg Hx        Home Medications:  Prior to Admission medications    Not on File        Social History:   Social History     Tobacco Use    Smoking status: Former     Current packs/day: 0.00     Average packs/day: 1 pack/day for 49.7 years (49.7 ttl pk-yrs)     Types: Cigarettes     Start date: 1972     Quit date: 2022     Years since quittin.7     Passive exposure: Past    Smokeless tobacco: Never    Tobacco comments:     2 cigarettes per day   Vaping Use    Vaping status: Never Used   Substance Use Topics    Alcohol use: Never    Drug use: Never         Review of Systems:  Review of Systems   Constitutional:  Negative for chills and fever.   HENT:  Negative for congestion, ear pain and sore throat.    Eyes:  Negative for pain.   Respiratory:  Negative for cough, chest tightness and shortness of breath.    Cardiovascular:  Negative for chest pain.   Gastrointestinal:  Negative for abdominal pain, diarrhea, nausea and vomiting.   Genitourinary:  Negative for flank pain and hematuria.   Musculoskeletal:  Positive for neck pain. Negative for joint swelling.   Skin:  Negative for pallor.   Neurological:  Negative for seizures and headaches.   All other systems reviewed and are negative.       Physical Exam:  /59   Pulse 65   Temp 98 °F (36.7 °C)   Resp 20   Ht 143.5 cm (56.5\")   Wt 60.1 kg (132 lb 7.9 oz)   SpO2 92%   BMI 29.18 kg/m²     Physical Exam  Vitals and nursing note reviewed.   Constitutional:       General: She is not in acute distress.     Appearance: Normal appearance. She is not " toxic-appearing.   HENT:      Head: Normocephalic and atraumatic.      Jaw: There is normal jaw occlusion.   Eyes:      General: Lids are normal.      Extraocular Movements: Extraocular movements intact.      Conjunctiva/sclera: Conjunctivae normal.      Pupils: Pupils are equal, round, and reactive to light.   Cardiovascular:      Rate and Rhythm: Normal rate and regular rhythm.      Pulses: Normal pulses.      Heart sounds: Normal heart sounds.   Pulmonary:      Effort: Pulmonary effort is normal. No respiratory distress.      Breath sounds: Normal breath sounds. No wheezing or rhonchi.   Abdominal:      General: Abdomen is flat.      Palpations: Abdomen is soft.      Tenderness: There is no abdominal tenderness. There is no guarding or rebound.   Musculoskeletal:         General: Normal range of motion.      Cervical back: Normal range of motion and neck supple.      Right lower leg: No edema.      Left lower leg: No edema.   Skin:     General: Skin is warm and dry.   Neurological:      Mental Status: She is alert and oriented to person, place, and time. Mental status is at baseline.      Comments: Intact bilateral motor and sensation in the radial ulnar and median nerve distributions.   Psychiatric:         Mood and Affect: Mood normal.                Procedures:  Procedures      Medical Decision Making:      Comorbidities that affect care:    Tobacco use, Parkinson's disease    External Notes reviewed:    Previous Clinic Note: Outpatient PCP visit 8/5/2024      The following orders were placed and all results were independently analyzed by me:  Orders Placed This Encounter   Procedures    XR Chest 1 View    CT Head Without Contrast    Comprehensive Metabolic Panel    High Sensitivity Troponin T    CBC Auto Differential    High Sensitivity Troponin T 2Hr    ECG 12 Lead Chest Pain    CBC & Differential    Extra Tubes    Gold Top - SST    Light Blue Top       Medications Given in the Emergency  Department:  Medications   morphine injection 4 mg (4 mg Intravenous Given 10/24/24 0550)   ondansetron (ZOFRAN) injection 4 mg (4 mg Intravenous Given 10/24/24 0550)        ED Course:         Labs:    Lab Results (last 24 hours)       Procedure Component Value Units Date/Time    CBC & Differential [[id number]]  (Abnormal) Collected: 10/24/24 0449    Specimen: Blood Updated: 10/24/24 0504    Narrative:      The following orders were created for panel order CBC & Differential.  Procedure                               Abnormality         Status                     ---------                               -----------         ------                     CBC Auto Differential[[id number]]        Abnormal            Final result                 Please view results for these tests on the individual orders.    Comprehensive Metabolic Panel [[id number]]  (Abnormal) Collected: 10/24/24 0449    Specimen: Blood Updated: 10/24/24 0541     Glucose 129 mg/dL      BUN 10 mg/dL      Creatinine 0.71 mg/dL      Sodium 142 mmol/L      Potassium 4.2 mmol/L      Chloride 105 mmol/L      CO2 23.6 mmol/L      Calcium 10.0 mg/dL      Total Protein 6.8 g/dL      Albumin 4.2 g/dL      ALT (SGPT) 8 U/L      AST (SGOT) 14 U/L      Alkaline Phosphatase 120 U/L      Total Bilirubin 0.3 mg/dL      Globulin 2.6 gm/dL      A/G Ratio 1.6 g/dL      BUN/Creatinine Ratio 14.1     Anion Gap 13.4 mmol/L      eGFR 91.6 mL/min/1.73     Narrative:      GFR Normal >60  Chronic Kidney Disease <60  Kidney Failure <15      High Sensitivity Troponin T [[id number]]  (Normal) Collected: 10/24/24 0449    Specimen: Blood Updated: 10/24/24 0527     HS Troponin T <6 ng/L     Narrative:      High Sensitive Troponin T Reference Range:  <14.0 ng/L- Negative Female for AMI  <22.0 ng/L- Negative Male for AMI  >=14 - Abnormal Female indicating possible myocardial injury.  >=22 - Abnormal Male indicating possible myocardial injury.   Clinicians would have to utilize clinical acumen,  EKG, Troponin, and serial changes to determine if it is an Acute Myocardial Infarction or myocardial injury due to an underlying chronic condition.         CBC Auto Differential [[id number]]  (Abnormal) Collected: 10/24/24 0449    Specimen: Blood Updated: 10/24/24 0504     WBC 11.23 10*3/mm3      RBC 4.71 10*6/mm3      Hemoglobin 14.7 g/dL      Hematocrit 43.8 %      MCV 93.0 fL      MCH 31.2 pg      MCHC 33.6 g/dL      RDW 13.0 %      RDW-SD 44.4 fl      MPV 9.4 fL      Platelets 282 10*3/mm3      Neutrophil % 77.5 %      Lymphocyte % 16.1 %      Monocyte % 5.0 %      Eosinophil % 0.6 %      Basophil % 0.4 %      Immature Grans % 0.4 %      Neutrophils, Absolute 8.69 10*3/mm3      Lymphocytes, Absolute 1.81 10*3/mm3      Monocytes, Absolute 0.56 10*3/mm3      Eosinophils, Absolute 0.07 10*3/mm3      Basophils, Absolute 0.05 10*3/mm3      Immature Grans, Absolute 0.05 10*3/mm3      nRBC 0.0 /100 WBC     High Sensitivity Troponin T 2Hr [[id number]] Collected: 10/24/24 0626    Specimen: Blood Updated: 10/24/24 0654     HS Troponin T <6 ng/L      Troponin T Delta --     Comment: Unable to calculate.       Narrative:      High Sensitive Troponin T Reference Range:  <14.0 ng/L- Negative Female for AMI  <22.0 ng/L- Negative Male for AMI  >=14 - Abnormal Female indicating possible myocardial injury.  >=22 - Abnormal Male indicating possible myocardial injury.   Clinicians would have to utilize clinical acumen, EKG, Troponin, and serial changes to determine if it is an Acute Myocardial Infarction or myocardial injury due to an underlying chronic condition.                  Imaging:    CT Head Without Contrast    Result Date: 10/24/2024  CT HEAD WO CONTRAST-  Date of exam: 10/24/2024, 5:03 A.M.  Indications: Posterior headache (x 1 day); +HTN; no known recent head trauma/injury; + leukocytosis.  Comparison: 6/22/2018.  TECHNIQUE: Axial CT images were obtained of the head without contrast administration. Reconstructed 2D  coronal and sagittal images were also obtained. Automated exposure control and iterative construction methods were used. Additionally, the radiation dose reduction device was turned on for each scan per the ALARA (As Low as Reasonably Achievable) protocol. Please see the electronic medical record, EMR (i.e., Epic), for the documented radiation dose.  FINDINGS: A routine nonenhanced head CT was performed. No acute brain abnormality is seen. No acute infarct. No acute intracranial hemorrhage. No midline shift or acute intracranial mass effect is seen. The ventricular system and, to a greater degree, the extra-axial spaces are prominent, suggesting mild central atrophy. There is suspected mild chronic small vessel ischemic disease. Arterial calcifications are seen. No acute skull fracture is identified. The patient has undergone bilateral cataract extractions with intra-ocular lens implants. No significant acute findings are seen with regard to the imaged orbits, paranasal sinuses, or middle ear clefts.       No acute brain abnormality is appreciated. No acute intracranial hemorrhage. No acute infarct.    Portions of this note were completed with a voice recognition program.  Electronically Signed By-Loi Key MD On:10/24/2024 5:18 AM      XR Chest 1 View    Result Date: 10/24/2024  XR CHEST 1 VW-  Date of exam: 10/24/2024, 5:00 A.M.  Indication: chest pain  Comparison: 12/4/2023.  FINDINGS: A single AP (or PA) upright portable chest radiograph was performed. No cardiac enlargement is seen. No acute infiltrate is appreciated. No pleural effusion or pneumothorax is identified. Chronic calcified granulomatous disease involves the chest. The thoracic aorta is atherosclerotic and ectatic. Degenerative changes involve the imaged spine. No significant interval change is seen since the prior study (or studies).       No acute infiltrate is appreciated.    Portions of this note were completed with a voice recognition  program.  Electronically Signed By-Loi Key MD On:10/24/2024 5:11 AM         Differential Diagnosis and Discussion:    Chest Pain:  Based on the patient's signs and symptoms, I considered aortic dissection, myocardial infaction, pulmonary embolism, cardiac tamponade, pericarditis, pneumothorax, musculoskeletal chest pain and other differential diagnosis as an etiology of the patient's chest pain.     All labs were reviewed and interpreted by me.  All X-rays impressions were independently interpreted by me.  EKG was interpreted by me.    [facility]                     Patient Care Considerations:    NARCOTICS: I considered prescribing opiate pain medication as an outpatient, however no narcotic pain control required in the emergency department.      Consultants/Shared Management Plan:    None    Social Determinants of Health:    Patient has presented with family members who are responsible, reliable and will ensure follow up care.      Disposition and Care Coordination:    Discharged: The patient is suitable and stable for discharge with no need for consideration of admission.    I have explained the patient´s condition, diagnoses and treatment plan based on the information available to me at this time. I have answered questions and addressed any concerns. The patient has a good  understanding of the patient´s diagnosis, condition, and treatment plan as can be expected at this point. The vital signs have been stable. The patient´s condition is stable and appropriate for discharge from the emergency department.      The patient will pursue further outpatient evaluation with the primary care physician or other designated or consulting physician as outlined in the discharge instructions. They are agreeable to this plan of care and follow-up instructions have been explained in detail. The patient has received these instructions in written format and has expressed an understanding of the discharge instructions. The patient  is aware that any significant change in condition or worsening of symptoms should prompt an immediate return to this or the closest emergency department or call to 1.  I have explained discharge medications and the need for follow up with the patient/caretakers. This was also printed in the discharge instructions. Patient was discharged with the following medications and follow up:      Medication List        New Prescriptions      oxyCODONE-acetaminophen 5-325 MG per tablet  Commonly known as: PERCOCET  Take 1 tablet by mouth Every 6 (Six) Hours As Needed for Moderate Pain.     predniSONE 20 MG tablet  Commonly known as: DELTASONE  Take 2 tablets by mouth Daily for 5 days.               Where to Get Your Medications        These medications were sent to [facility], [address] - [phone] [facility] [phone] [address] [address]      Phone: [phone]   oxyCODONE-acetaminophen 5-325 MG per tablet  predniSONE 20 MG tablet      Rosalba Smith, APRN  [address] [facility]   [facility] [address] [phone]    Schedule an appointment as soon as possible for a visit          Final diagnoses:   Acute cervical radiculopathy        ED Disposition       ED Disposition   Discharge    Condition   Stable    Comment   --               This medical record created using voice recognition software.             Kel Hernandez MD  10/24/24 0281

## 2024-10-31 ENCOUNTER — OFFICE VISIT (OUTPATIENT)
Dept: FAMILY MEDICINE CLINIC | Facility: CLINIC | Age: 69
End: 2024-10-31
Payer: MEDICARE

## 2024-10-31 VITALS
OXYGEN SATURATION: 98 % | WEIGHT: 130 LBS | HEIGHT: 57 IN | TEMPERATURE: 96.4 F | SYSTOLIC BLOOD PRESSURE: 168 MMHG | HEART RATE: 93 BPM | DIASTOLIC BLOOD PRESSURE: 100 MMHG | BODY MASS INDEX: 28.05 KG/M2

## 2024-10-31 DIAGNOSIS — I10 HYPERTENSION, UNSPECIFIED TYPE: ICD-10-CM

## 2024-10-31 DIAGNOSIS — G20.A1 PARKINSON'S DISEASE, UNSPECIFIED WHETHER DYSKINESIA PRESENT, UNSPECIFIED WHETHER MANIFESTATIONS FLUCTUATE: ICD-10-CM

## 2024-10-31 DIAGNOSIS — R53.83 FATIGUE, UNSPECIFIED TYPE: ICD-10-CM

## 2024-10-31 DIAGNOSIS — Z87.891 PERSONAL HISTORY OF NICOTINE DEPENDENCE: ICD-10-CM

## 2024-10-31 DIAGNOSIS — R20.0 LIP NUMBNESS: ICD-10-CM

## 2024-10-31 DIAGNOSIS — R42 EPISODE OF DIZZINESS: ICD-10-CM

## 2024-10-31 DIAGNOSIS — Z12.2 ENCOUNTER FOR SCREENING FOR LUNG CANCER: ICD-10-CM

## 2024-10-31 DIAGNOSIS — Z09 ENCOUNTER FOR EXAMINATION FOLLOWING TREATMENT AT HOSPITAL: Primary | ICD-10-CM

## 2024-10-31 DIAGNOSIS — R51.9 UNILATERAL OCCIPITAL HEADACHE: ICD-10-CM

## 2024-10-31 DIAGNOSIS — Z23 NEED FOR INFLUENZA VACCINATION: ICD-10-CM

## 2024-10-31 RX ORDER — LISINOPRIL 10 MG/1
10 TABLET ORAL DAILY
Qty: 30 TABLET | Refills: 0 | Status: SHIPPED | OUTPATIENT
Start: 2024-10-31

## 2024-10-31 NOTE — PROGRESS NOTES
Chief Complaint  Headache (Follow up from the ER for having Stabbing pain in her back of her head going down her Right shoulder. )    Subjective            Faith MartinezGustavo presents to [facility]  History of Present Illness  Pt reports was seen int he ER dept and was experiencing severe HA and neck pain and all right sided--and even some lip numbness --and still reports some lip numbness and then reports the pt first had severe neck pain at home and then waited 2 hrs and then woke  up  and was so severe that she ended up going to the ER dept--    Then there at the ER dept the pt reports BP readings 208/100--and then they did not have her on the telemetry and then then the pain was horrible    Then they did labs and cardiac enzymes and CT and EKG and CXR --from an acute standpoint everything was negative patient was given pain medication in the ER--    Also the patient was unsure if she was having strokelike symptoms because her lip is still a little numb and she still has that occipital right-sided headache pressure and some mild right shoulder and then also reports did not know if possibly could be part of the Parkinson's and she has not seen neurology for the Parkinson's and 2 years  Extremity Pain   The pain is present in the neck and right shoulder. The problem has been unchanged. The quality of the pain is described as aching, burning, cramping, shooting and stabbing. The pain is at a severity of 3/10. Associated symptoms include numbness, tingling and difficulty holding things. Pertinent negatives include no upper extremity swelling or redness.   Headache      PHQ-2 Total Score: 0  PHQ-9 Total Score:      Past Medical History:   Diagnosis Date    Arthritis     Cataract     CTS (carpal tunnel syndrome) 20010    GERD (gastroesophageal reflux disease)     GERD (gastroesophageal reflux disease) 08/17/2021    Hyperlipidemia     Loss of appetite 08/17/2021    Nausea  2021    Parkinson's disease     Sciatica     Thoracic or lumbosacral neuritis or radiculitis        No Known Allergies     Past Surgical History:   Procedure Laterality Date    BACK SURGERY      LOW BACK DISC    CARPAL TUNNEL RELEASE      CATARACT EXTRACTION      COLONOSCOPY      ENDOSCOPY      ENDOSCOPY N/A 2022    Procedure: ESOPHAGOGASTRODUODENOSCOPY WITH BIOPSIES;  Surgeon: Luis Enrique Valenzuela MD;  Location: [facility] ENDOSCOPY;  Service: Gastroenterology;  Laterality: N/A;  HIATAL HERNIA    FOOT SURGERY      HYSTERECTOMY      DUE TO CANCER    SHOULDER SURGERY      TUBAL ABDOMINAL LIGATION      UPPER GASTROINTESTINAL ENDOSCOPY          Social History     Tobacco Use    Smoking status: Former     Current packs/day: 0.00     Average packs/day: 1 pack/day for 49.7 years (49.7 ttl pk-yrs)     Types: Cigarettes     Start date: 1972     Quit date: 2022     Years since quittin.7     Passive exposure: Past    Smokeless tobacco: Never    Tobacco comments:     2 cigarettes per day   Vaping Use    Vaping status: Never Used   Substance Use Topics    Alcohol use: Never    Drug use: Never       Family History   Problem Relation Age of Onset    Depression Mother     Hyperlipidemia Mother     Hypertension Mother     Alcohol abuse Father     Depression Sister     Hyperlipidemia Sister     Hypertension Sister     Osteoporosis Sister     Depression Brother     Hypertension Brother     Alcohol abuse Brother     Alcohol abuse Maternal Grandfather     Alcohol abuse Paternal Grandfather     Colon cancer Paternal Uncle     Malig Hyperthermia Neg Hx         Health Maintenance Due   Topic Date Due    LUNG CANCER SCREENING  Never done    COLORECTAL CANCER SCREENING  2025        Current Outpatient Medications on File Prior to Visit   Medication Sig    oxyCODONE-acetaminophen (PERCOCET) 5-325 MG per tablet Take 1 tablet by mouth Every 6 (Six) Hours As Needed for Moderate Pain.     No current  "facility-administered medications on file prior to visit.       Immunization History   Administered Date(s) Administered    COVID-19 (PFIZER) Purple Cap Monovalent 04/24/2021, 05/15/2021, 11/23/2021, 07/25/2022    FLUAD TRI 65YR+ 01/07/2014    Flu Vaccine Split Quad 11/17/2015, 10/11/2016    Fluad Quad 65+ 10/30/2023    Fluzone (or Fluarix & Flulaval for VFC) >6mos 11/17/2015, 10/11/2016    Fluzone High-Dose 65+YRS 10/31/2024    Fluzone High-Dose 65+yrs 10/25/2021    Fluzone Quad >6mos (Multi-dose) 10/23/2018    Influenza Seasonal Injectable 01/07/2014    Influenza, Unspecified 09/09/2014, 11/17/2015, 10/15/2020    Pneumococcal Conjugate 20-Valent (PCV20) 07/25/2023    Pneumococcal Polysaccharide (PPSV23) 07/23/2019    Tdap 07/19/2017       Review of Systems   Constitutional:  Positive for fatigue.   HENT:  Negative for trouble swallowing.    Respiratory:  Negative for shortness of breath.    Cardiovascular:  Negative for chest pain, palpitations and leg swelling.   Gastrointestinal:  Negative for abdominal pain and blood in stool.        Belching    Musculoskeletal:         Right sided neck pain--but is improved and much less    Neurological:  Positive for dizziness, tingling, numbness and headache. Negative for seizures, syncope and light-headedness.        Pressure to the back of the head and then the neck pain not real bad now   Psychiatric/Behavioral: Negative.  Negative for self-injury and suicidal ideas.         Objective     /100   Pulse 93   Temp 96.4 °F (35.8 °C) (Temporal)   Ht 143.5 cm (56.5\")   Wt 59 kg (130 lb)   SpO2 98%   BMI 28.63 kg/m²       Physical Exam  Vitals and nursing note reviewed.   Constitutional:       Appearance: Normal appearance.   HENT:      Head: Normocephalic.      Right Ear: Tympanic membrane, ear canal and external ear normal.      Left Ear: Tympanic membrane, ear canal and external ear normal.      Nose: Nose normal.      Mouth/Throat:      Mouth: Mucous membranes " are moist.   Eyes:      Pupils: Pupils are equal, round, and reactive to light.   Cardiovascular:      Rate and Rhythm: Normal rate and regular rhythm.      Pulses:           Carotid pulses are 2+ on the right side and 2+ on the left side.     Heart sounds: Normal heart sounds.   Pulmonary:      Effort: Pulmonary effort is normal.      Breath sounds: Wheezing present.      Comments: Loose scattered and then clears somewhat with cough   Abdominal:      Palpations: Abdomen is soft.   Musculoskeletal:         General: Normal range of motion.      Cervical back: Normal range of motion.      Right lower leg: No edema.      Left lower leg: No edema.   Skin:     General: Skin is warm and dry.   Neurological:      Mental Status: She is alert and oriented to person, place, and time.   Psychiatric:         Mood and Affect: Mood normal.         Behavior: Behavior normal.         Thought Content: Thought content normal.         Judgment: Judgment normal.         Result Review :     The following data was reviewed by: VEL Hernandez on 10/31/2024:                 ECG 12 Lead Chest Pain (10/24/2024 05:11)   XR Chest 1 View (10/24/2024 05:06)  CT Head Without Contrast (10/24/2024 05:04)  High Sensitivity Troponin T 2Hr (10/24/2024 06:26)  Extra Tubes (10/24/2024 04:49)  High Sensitivity Troponin T (10/24/2024 04:49)  Comprehensive Metabolic Panel (10/24/2024 04:49)  CBC & Differential (10/24/2024 04:49)  ED with Kel Hernandez MD (10/24/2024)      Assessment and Plan      Diagnoses and all orders for this visit:    1. Encounter for examination following treatment at hospital (Primary)  -     Ambulatory Referral to Cardiology  -     Ambulatory Referral to Neurology    2. Lip numbness  -     MRI Brain With & Without Contrast; Future  -     Ambulatory Referral to Neurology    3. Hypertension, unspecified type, new onset  -     Ambulatory Referral to Cardiology  -     lisinopril (PRINIVIL,ZESTRIL) 10 MG tablet; Take 1  tablet by mouth Daily.  Dispense: 30 tablet; Refill: 0    4. Unilateral occipital headache  -     MRI Brain With & Without Contrast; Future  -     Ambulatory Referral to Neurology    5. Parkinson's disease, unspecified whether dyskinesia present, unspecified whether manifestations fluctuate  -     Ambulatory Referral to Neurology    6. Need for influenza vaccination  -     Fluzone High-Dose 65+yrs (2428-0488)    7. Fatigue, unspecified type  -     Ambulatory Referral to Cardiology  -     Ambulatory Referral to Neurology    8. Episode of dizziness  -     MRI Brain With & Without Contrast; Future  -     Ambulatory Referral to Cardiology  -     Ambulatory Referral to Neurology    9. Encounter for screening for lung cancer  -      CT Chest Low Dose Cancer Screening WO; Future    10. Personal history of nicotine dependence  -      CT Chest Low Dose Cancer Screening WO; Future    Patient did get her influenza vaccine today    We will proceed with referral back to neurology regarding the Parkinson's and the headaches dizziness etc. and in the meantime we will order the MRI with and without contrast especially because of the persistent headache right-sided as well as the right sided lip numbness    And then we will proceed with referral to cardiology and then also go ahead and start her on a low-dose lisinopril 10 mg once daily I did discuss side effects risks and benefits and to monitor the blood pressure and to follow-up in 2 weeks and if she should develop an ACE inhibitor cough to let me know and we will switch her over to losartan    And then we are also ordering the CT of the chest for lung cancer screening as she is due        Follow Up     Return in about 2 weeks (around 11/14/2024), or if symptoms worsen or fail to improve, for Recheck.    Patient was given instructions and counseling regarding her condition or for health maintenance advice. Please see specific information pulled into the AVS if appropriate.             Faith ANGEL Yu  reports that she quit smoking about 2 years ago. Her smoking use included cigarettes. She started smoking about 52 years ago. She has a 49.7 pack-year smoking history. She has been exposed to tobacco smoke. She has never used smokeless tobacco. I have educated her on the risk of diseases from using tobacco products such as cancer, COPD, and heart disease.                Answers submitted by the patient for this visit:  Primary Reason for Visit (Submitted on 10/26/2024)  What is the primary reason for your visit?: Extremity Pain  Lower Extremity Injury Questionnaire (Submitted on 10/26/2024)  Chief Complaint: Extremity pain  Injury: No

## 2024-11-04 ENCOUNTER — TELEPHONE (OUTPATIENT)
Dept: FAMILY MEDICINE CLINIC | Facility: CLINIC | Age: 69
End: 2024-11-04
Payer: MEDICARE

## 2024-11-04 NOTE — TELEPHONE ENCOUNTER
Caller: Faith Yu    Relationship to patient: Self    Best call back number: [phone]     Patient is needing: PATIENT IS REQUESTING A STATUS UPDATE ON MRI AND CT SCHEDULING.

## 2024-11-07 ENCOUNTER — OFFICE VISIT (OUTPATIENT)
Dept: CARDIOLOGY | Facility: CLINIC | Age: 69
End: 2024-11-07
Payer: MEDICARE

## 2024-11-07 VITALS
HEIGHT: 57 IN | SYSTOLIC BLOOD PRESSURE: 137 MMHG | BODY MASS INDEX: 28.3 KG/M2 | HEART RATE: 66 BPM | WEIGHT: 131.2 LBS | DIASTOLIC BLOOD PRESSURE: 63 MMHG

## 2024-11-07 DIAGNOSIS — Z72.0 TOBACCO ABUSE: ICD-10-CM

## 2024-11-07 DIAGNOSIS — I10 PRIMARY HYPERTENSION: Primary | ICD-10-CM

## 2024-11-07 DIAGNOSIS — R53.83 OTHER FATIGUE: ICD-10-CM

## 2024-11-07 PROCEDURE — 99204 OFFICE O/P NEW MOD 45 MIN: CPT | Performed by: INTERNAL MEDICINE

## 2024-11-07 NOTE — PROGRESS NOTES
Chief Complaint  Fatigue, Hypertension, Dizziness, and Establish Care    Subjective        Faith Yu presents to [facility]  History of present illness:    Patient is a 69-year-old female who went to the emergency department back on October 24 with headache along with neck pain.  She did have workup done including 2 troponins that were negative along with chest x-ray and CT of the head.  Blood pressure was very elevated at that time.  She did go to her primary care on 3 October and her blood pressure was still elevated.  She was put on lisinopril at that time.  Patient notes that she is active although she is limited by hip pain.  She notes no chest pain when she is walking around notes that she fatigues easier.  She does have a history of Parkinson's and was tried on Sinemet in the past with significant side effects.  She does have a long history of smoking and currently smokes less than a half a pack per day.  She notes no alcohol use.  Mother with a history of peripheral arterial disease.      Past Medical History:   Diagnosis Date    Arthritis     Cataract     CTS (carpal tunnel syndrome) 20010    GERD (gastroesophageal reflux disease)     GERD (gastroesophageal reflux disease) 08/17/2021    Hyperlipidemia     Loss of appetite 08/17/2021    Nausea 08/17/2021    Parkinson's disease     Sciatica     Thoracic or lumbosacral neuritis or radiculitis          Past Surgical History:   Procedure Laterality Date    BACK SURGERY      LOW BACK DISC    CARPAL TUNNEL RELEASE      CATARACT EXTRACTION      COLONOSCOPY      ENDOSCOPY      ENDOSCOPY N/A 01/19/2022    Procedure: ESOPHAGOGASTRODUODENOSCOPY WITH BIOPSIES;  Surgeon: Luis Enrique Valenzuela MD;  Location: [facility] ENDOSCOPY;  Service: Gastroenterology;  Laterality: N/A;  HIATAL HERNIA    FOOT SURGERY      HYSTERECTOMY      DUE TO CANCER    SHOULDER SURGERY      TUBAL ABDOMINAL LIGATION      UPPER GASTROINTESTINAL ENDOSCOPY       "      Social History     Socioeconomic History    Marital status:    Tobacco Use    Smoking status: Former     Current packs/day: 0.00     Average packs/day: 1 pack/day for 49.7 years (49.7 ttl pk-yrs)     Types: Cigarettes     Start date: 1972     Quit date: 2022     Years since quittin.7     Passive exposure: Past    Smokeless tobacco: Never    Tobacco comments:     2 cigarettes per day   Vaping Use    Vaping status: Never Used   Substance and Sexual Activity    Alcohol use: Never    Drug use: Never    Sexual activity: Yes     Partners: Male         Family History   Problem Relation Age of Onset    Depression Mother     Hyperlipidemia Mother     Hypertension Mother     Alcohol abuse Father     Depression Sister     Hyperlipidemia Sister     Hypertension Sister     Osteoporosis Sister     Depression Brother     Hypertension Brother     Alcohol abuse Brother     Alcohol abuse Maternal Grandfather     Alcohol abuse Paternal Grandfather     Colon cancer Paternal Uncle     Malig Hyperthermia Neg Hx           No Known Allergies         Current Outpatient Medications:     lisinopril (PRINIVIL,ZESTRIL) 10 MG tablet, Take 1 tablet by mouth Daily., Disp: 30 tablet, Rfl: 0      ROS:  Cardiac review of systems positive for exertional fatigue    Objective     /63   Pulse 66   Ht 143.8 cm (56.6\")   Wt 59.5 kg (131 lb 3.2 oz)   BMI 28.79 kg/m²       General Appearance:   well developed  well nourished  HENT:   oropharynx moist  lips not cyanotic  Respiratory:  no respiratory distress  normal breath sounds  no rales  Cardiovascular:  no jugular venous distention  regular rhythm  S1 normal, S2 normal  no S3, no S4   no murmur  no rub, no thrill  No carotid bruit  pedal pulses normal  lower extremity edema: none    Musculoskeletal:  no clubbing of fingers.   normocephalic, head atraumatic  Skin:   warm, dry  Psychiatric:  judgement and insight appropriate  normal mood and " affect    ECHO:    STRESS:    CATH:  No results found for this or any previous visit.    BMP:     Glucose   Date Value Ref Range Status   10/24/2024 129 (H) 65 - 99 mg/dL Final     BUN   Date Value Ref Range Status   10/24/2024 10 8 - 23 mg/dL Final     Creatinine   Date Value Ref Range Status   10/24/2024 0.71 0.57 - 1.00 mg/dL Final     Sodium   Date Value Ref Range Status   10/24/2024 142 136 - 145 mmol/L Final     Potassium   Date Value Ref Range Status   10/24/2024 4.2 3.5 - 5.2 mmol/L Final     Chloride   Date Value Ref Range Status   10/24/2024 105 98 - 107 mmol/L Final     CO2   Date Value Ref Range Status   10/24/2024 23.6 22.0 - 29.0 mmol/L Final     Calcium   Date Value Ref Range Status   10/24/2024 10.0 8.6 - 10.5 mg/dL Final     BUN/Creatinine Ratio   Date Value Ref Range Status   10/24/2024 14.1 7.0 - 25.0 Final     Anion Gap   Date Value Ref Range Status   10/24/2024 13.4 5.0 - 15.0 mmol/L Final     eGFR   Date Value Ref Range Status   10/24/2024 91.6 >60.0 mL/min/1.73 Final     LIPIDS:  Total Cholesterol   Date Value Ref Range Status   08/26/2024 220 (H) 0 - 200 mg/dL Final     Triglycerides   Date Value Ref Range Status   08/26/2024 147 0 - 150 mg/dL Final     HDL Cholesterol   Date Value Ref Range Status   08/26/2024 51 40 - 60 mg/dL Final     LDL Cholesterol    Date Value Ref Range Status   08/26/2024 143 (H) 0 - 100 mg/dL Final     VLDL Cholesterol   Date Value Ref Range Status   08/26/2024 26 5 - 40 mg/dL Final     LDL/HDL Ratio   Date Value Ref Range Status   08/26/2024 2.74  Final         Procedures             ASSESSMENT:  Diagnoses and all orders for this visit:    1. Primary hypertension (Primary)    2. Other fatigue    3. Tobacco abuse         PLAN:    1.  Reviewed patient's emergency department visit which showed negative troponins, chest x-ray and CT head.  Her EKG showed normal sinus rhythm with left axis deviation and 1 PVC.  It does not meet criteria for old inferior infarct.   Basically the EKG is pretty normal.  2.  For patient's exertional fatigue I did tell her we could consider a stress test or possibly looking into PFTs.  The patient just wants to continue to monitor it.  I think this is fine.  If it gets worse she will tell her primary care physician and they could consider this workup.  3.  Blood pressures under good control with the addition of lisinopril.  4.  Encourage smoking cessation.  5.  When patient is walking she notes no chest pain and is limited mainly by bilateral hip pain.  6.  Patient's cardiac physical exam is entirely normal.  7.  We will just see back as needed.      Return if symptoms worsen or fail to improve.     Patient was given instructions and counseling regarding her condition or for health maintenance advice. Please see specific information pulled into the AVS if appropriate.         Pérez Bullock MD   11/7/2024  12:37 EST

## 2024-11-14 ENCOUNTER — OFFICE VISIT (OUTPATIENT)
Dept: FAMILY MEDICINE CLINIC | Facility: CLINIC | Age: 69
End: 2024-11-14
Payer: MEDICARE

## 2024-11-14 VITALS
OXYGEN SATURATION: 94 % | WEIGHT: 133.2 LBS | DIASTOLIC BLOOD PRESSURE: 70 MMHG | TEMPERATURE: 97 F | SYSTOLIC BLOOD PRESSURE: 130 MMHG | HEART RATE: 89 BPM | BODY MASS INDEX: 29.23 KG/M2

## 2024-11-14 DIAGNOSIS — I10 HYPERTENSION, UNSPECIFIED TYPE: ICD-10-CM

## 2024-11-14 PROCEDURE — 99213 OFFICE O/P EST LOW 20 MIN: CPT | Performed by: NURSE PRACTITIONER

## 2024-11-14 PROCEDURE — 1126F AMNT PAIN NOTED NONE PRSNT: CPT | Performed by: NURSE PRACTITIONER

## 2024-11-14 PROCEDURE — 1159F MED LIST DOCD IN RCRD: CPT | Performed by: NURSE PRACTITIONER

## 2024-11-14 PROCEDURE — 1160F RVW MEDS BY RX/DR IN RCRD: CPT | Performed by: NURSE PRACTITIONER

## 2024-11-14 RX ORDER — LISINOPRIL 10 MG/1
10 TABLET ORAL DAILY
Qty: 90 TABLET | Refills: 1 | Status: SHIPPED | OUTPATIENT
Start: 2024-11-14

## 2024-11-14 NOTE — PROGRESS NOTES
Chief Complaint  Hypertension (Follow up )    Subjective            Faith Yu presents to [facility]  History of Present Illness  Patient is here today for follow-up with regards to her hypertension at last visit to the best we got as far as her reading was 160/100 patient was quite symptomatic today she comes in on the lisinopril 10 mg daily And she is at 130/70    Did see the cardiology and she was very pleased with them--and he felt did not need the stress test at this time--and BP very well controlled now    Then goes tomorrow for the Ct of the lungs     Then next Thursday goes for the MRI of brain     Then F/U with neurology in May 2025       PHQ-2 Total Score:    PHQ-9 Total Score:      Past Medical History:   Diagnosis Date    Arthritis     Cataract     CTS (carpal tunnel syndrome)     GERD (gastroesophageal reflux disease)     GERD (gastroesophageal reflux disease) 2021    Hyperlipidemia     Loss of appetite 2021    Nausea 2021    Parkinson's disease     Sciatica     Thoracic or lumbosacral neuritis or radiculitis        No Known Allergies     Past Surgical History:   Procedure Laterality Date    BACK SURGERY      LOW BACK DISC    CARPAL TUNNEL RELEASE      CATARACT EXTRACTION      COLONOSCOPY      ENDOSCOPY      ENDOSCOPY N/A 2022    Procedure: ESOPHAGOGASTRODUODENOSCOPY WITH BIOPSIES;  Surgeon: Luis Enrique Valenzuela MD;  Location: [facility] ENDOSCOPY;  Service: Gastroenterology;  Laterality: N/A;  HIATAL HERNIA    FOOT SURGERY      HYSTERECTOMY      DUE TO CANCER    SHOULDER SURGERY      TUBAL ABDOMINAL LIGATION      UPPER GASTROINTESTINAL ENDOSCOPY          Social History     Tobacco Use    Smoking status: Former     Current packs/day: 0.00     Average packs/day: 1 pack/day for 49.7 years (49.7 ttl pk-yrs)     Types: Cigarettes     Start date: 1972     Quit date: 2022     Years since quittin.8     Passive exposure: Past     Smokeless tobacco: Never    Tobacco comments:     2 cigarettes per day   Vaping Use    Vaping status: Never Used   Substance Use Topics    Alcohol use: Never    Drug use: Never       Family History   Problem Relation Age of Onset    Depression Mother     Hyperlipidemia Mother     Hypertension Mother     Alcohol abuse Father     Depression Sister     Hyperlipidemia Sister     Hypertension Sister     Osteoporosis Sister     Depression Brother     Hypertension Brother     Alcohol abuse Brother     Alcohol abuse Maternal Grandfather     Alcohol abuse Paternal Grandfather     Colon cancer Paternal Uncle     Malig Hyperthermia Neg Hx         Health Maintenance Due   Topic Date Due    LUNG CANCER SCREENING  Never done    COLORECTAL CANCER SCREENING  03/02/2025        Current Outpatient Medications on File Prior to Visit   Medication Sig    [DISCONTINUED] lisinopril (PRINIVIL,ZESTRIL) 10 MG tablet Take 1 tablet by mouth Daily.     No current facility-administered medications on file prior to visit.       Immunization History   Administered Date(s) Administered    COVID-19 (PFIZER) Purple Cap Monovalent 04/24/2021, 05/15/2021, 11/23/2021, 07/25/2022    FLUAD TRI 65YR+ 01/07/2014    Flu Vaccine Split Quad 11/17/2015, 10/11/2016    Fluad Quad 65+ 10/30/2023    Fluzone (or Fluarix & Flulaval for VFC) >6mos 11/17/2015, 10/11/2016    Fluzone High-Dose 65+YRS 10/31/2024    Fluzone High-Dose 65+yrs 10/25/2021    Fluzone Quad >6mos (Multi-dose) 10/23/2018    Influenza Seasonal Injectable 01/07/2014    Influenza, Unspecified 09/09/2014, 11/17/2015, 10/15/2020    Pneumococcal Conjugate 20-Valent (PCV20) 07/25/2023    Pneumococcal Polysaccharide (PPSV23) 07/23/2019    Tdap 07/19/2017       Review of Systems   Constitutional:  Positive for fatigue.   HENT:  Negative for trouble swallowing.    Respiratory:  Negative for cough and shortness of breath.    Cardiovascular:  Negative for chest pain.   Gastrointestinal:  Negative for  abdominal pain.   Genitourinary: Negative.    Musculoskeletal: Negative.    Neurological:  Positive for tremors. Negative for dizziness, seizures, syncope and light-headedness.        Then the pt reports the dizziness only like a 3/10 now--far better--and then still has the fine hand tremor     Psychiatric/Behavioral: Negative.          Objective     /70 (BP Location: Left arm, Patient Position: Sitting)   Pulse 89   Temp 97 °F (36.1 °C)   Wt 60.4 kg (133 lb 3.2 oz)   SpO2 94%   BMI 29.23 kg/m²       Physical Exam  Vitals and nursing note reviewed.   Constitutional:       Appearance: Normal appearance.   HENT:      Head: Normocephalic.      Right Ear: External ear normal.      Left Ear: External ear normal.      Nose: Nose normal.      Mouth/Throat:      Mouth: Mucous membranes are moist.   Eyes:      Pupils: Pupils are equal, round, and reactive to light.   Cardiovascular:      Rate and Rhythm: Normal rate and regular rhythm.      Heart sounds: Normal heart sounds. No murmur heard.  Pulmonary:      Effort: Pulmonary effort is normal.      Breath sounds: Normal breath sounds.   Abdominal:      Palpations: Abdomen is soft.   Musculoskeletal:         General: Normal range of motion.      Cervical back: Normal range of motion.      Right lower leg: No edema.      Left lower leg: No edema.   Skin:     General: Skin is warm and dry.   Neurological:      Mental Status: She is alert and oriented to person, place, and time.      Motor: Tremor present.      Comments: Fine hand tremors bilat    Psychiatric:         Mood and Affect: Mood normal.         Behavior: Behavior normal.         Thought Content: Thought content normal.         Judgment: Judgment normal.         Result Review :     The following data was reviewed by: VEL Hernandez on 11/14/2024:                 Office Visit with Pérez Bullock MD (11/07/2024)      Assessment and Plan      Diagnoses and all orders for this visit:    1.  Hypertension, unspecified type, new onset  -     lisinopril (PRINIVIL,ZESTRIL) 10 MG tablet; Take 1 tablet by mouth Daily.  Dispense: 90 tablet; Refill: 1    Continue the lisinopril and then F/U approx 6 months   Appointment with Kortney Vilchis APRN (05/13/2025)       Follow Up     Return in about 6 months (around 5/14/2025), or if symptoms worsen or fail to improve, for Recheck, fasting labs and refills.    Patient was given instructions and counseling regarding her condition or for health maintenance advice. Please see specific information pulled into the AVS if appropriate.            Faith ORTIZ Gigi  reports that she quit smoking about 2 years ago. Her smoking use included cigarettes. She started smoking about 52 years ago. She has a 49.7 pack-year smoking history. She has been exposed to tobacco smoke. She has never used smokeless tobacco. I have educated her on the risk of diseases from using tobacco products such as cancer, COPD, and heart disease.

## 2024-11-15 ENCOUNTER — HOSPITAL ENCOUNTER (OUTPATIENT)
Dept: CT IMAGING | Facility: HOSPITAL | Age: 69
Discharge: HOME | End: 2024-11-15
Payer: MEDICARE

## 2024-11-15 DIAGNOSIS — Z87.891 PERSONAL HISTORY OF NICOTINE DEPENDENCE: ICD-10-CM

## 2024-11-15 DIAGNOSIS — Z12.2 ENCOUNTER FOR SCREENING FOR LUNG CANCER: ICD-10-CM

## 2024-11-15 PROCEDURE — 71271 CT THORAX LUNG CANCER SCR C-: CPT

## 2024-12-10 ENCOUNTER — HOSPITAL ENCOUNTER (OUTPATIENT)
Dept: MRI IMAGING | Facility: HOSPITAL | Age: 69
Discharge: HOME | End: 2024-12-10
Admitting: NURSE PRACTITIONER
Payer: MEDICARE

## 2024-12-10 DIAGNOSIS — R20.0 LIP NUMBNESS: ICD-10-CM

## 2024-12-10 DIAGNOSIS — R51.9 UNILATERAL OCCIPITAL HEADACHE: ICD-10-CM

## 2024-12-10 DIAGNOSIS — R42 EPISODE OF DIZZINESS: ICD-10-CM

## 2024-12-10 LAB
CREAT BLDA-MCNC: 0.8 MG/DL (ref 0.6–1.3)
EGFRCR SERPLBLD CKD-EPI 2021: 79.4 ML/MIN/1.73

## 2024-12-10 PROCEDURE — 82565 ASSAY OF CREATININE: CPT

## 2024-12-10 PROCEDURE — A9577 INJ MULTIHANCE: HCPCS | Performed by: NURSE PRACTITIONER

## 2024-12-10 PROCEDURE — 70553 MRI BRAIN STEM W/O & W/DYE: CPT

## 2024-12-10 PROCEDURE — 25510000002 GADOBENATE DIMEGLUMINE 529 MG/ML SOLUTION: Performed by: NURSE PRACTITIONER

## 2024-12-10 RX ADMIN — GADOBENATE DIMEGLUMINE 12 ML: 529 INJECTION, SOLUTION INTRAVENOUS at 09:06

## 2024-12-10 NOTE — PROGRESS NOTES
Please mail letter to patient stating    Faith the MRI of the brain with and without contrast shows findings compatible with chronic microvascular ischemic changes and then up probable/possible remote lacunar infarct in the right cerebellum no diffusion of restriction that suggest anything acute or new and no abnormal contrast enhancement was identified

## 2025-03-05 ENCOUNTER — TELEPHONE (OUTPATIENT)
Dept: FAMILY MEDICINE CLINIC | Facility: CLINIC | Age: 70
End: 2025-03-05

## 2025-03-05 ENCOUNTER — OFFICE VISIT (OUTPATIENT)
Dept: FAMILY MEDICINE CLINIC | Facility: CLINIC | Age: 70
End: 2025-03-05
Payer: MEDICARE

## 2025-03-05 VITALS
HEIGHT: 57 IN | BODY MASS INDEX: 28.8 KG/M2 | DIASTOLIC BLOOD PRESSURE: 80 MMHG | OXYGEN SATURATION: 97 % | WEIGHT: 133.5 LBS | SYSTOLIC BLOOD PRESSURE: 124 MMHG | TEMPERATURE: 98 F | HEART RATE: 96 BPM

## 2025-03-05 DIAGNOSIS — M54.12 CERVICAL RADICULOPATHY: ICD-10-CM

## 2025-03-05 DIAGNOSIS — I10 HYPERTENSION, UNSPECIFIED TYPE: ICD-10-CM

## 2025-03-05 DIAGNOSIS — M50.30 DDD (DEGENERATIVE DISC DISEASE), CERVICAL: ICD-10-CM

## 2025-03-05 DIAGNOSIS — M53.82 LIMITED ACTIVE RANGE OF MOTION (AROM) OF CERVICAL SPINE ON ROTATION: ICD-10-CM

## 2025-03-05 DIAGNOSIS — R53.83 FATIGUE, UNSPECIFIED TYPE: ICD-10-CM

## 2025-03-05 DIAGNOSIS — M79.18 RHOMBOID MUSCLE PAIN: ICD-10-CM

## 2025-03-05 DIAGNOSIS — I10 HYPERTENSION, UNSPECIFIED TYPE: Primary | ICD-10-CM

## 2025-03-05 PROCEDURE — 99214 OFFICE O/P EST MOD 30 MIN: CPT | Performed by: NURSE PRACTITIONER

## 2025-03-05 PROCEDURE — 1159F MED LIST DOCD IN RCRD: CPT | Performed by: NURSE PRACTITIONER

## 2025-03-05 PROCEDURE — 1160F RVW MEDS BY RX/DR IN RCRD: CPT | Performed by: NURSE PRACTITIONER

## 2025-03-05 PROCEDURE — 1125F AMNT PAIN NOTED PAIN PRSNT: CPT | Performed by: NURSE PRACTITIONER

## 2025-03-05 RX ORDER — LISINOPRIL 10 MG/1
10 TABLET ORAL DAILY
Qty: 90 TABLET | Refills: 0 | Status: SHIPPED | OUTPATIENT
Start: 2025-03-05 | End: 2025-03-05 | Stop reason: SDUPTHER

## 2025-03-05 RX ORDER — LISINOPRIL 10 MG/1
10 TABLET ORAL DAILY
Qty: 90 TABLET | Refills: 0 | Status: SHIPPED | OUTPATIENT
Start: 2025-03-05

## 2025-03-05 RX ORDER — TIZANIDINE 2 MG/1
2 TABLET ORAL 2 TIMES DAILY PRN
Qty: 90 TABLET | Refills: 1 | Status: SHIPPED | OUTPATIENT
Start: 2025-03-05 | End: 2025-03-05 | Stop reason: SDUPTHER

## 2025-03-05 RX ORDER — METHYLPREDNISOLONE 4 MG/1
TABLET ORAL
Qty: 21 EACH | Refills: 0 | Status: SHIPPED | OUTPATIENT
Start: 2025-03-05 | End: 2025-03-05 | Stop reason: SDUPTHER

## 2025-03-05 RX ORDER — METHYLPREDNISOLONE 4 MG/1
TABLET ORAL
Qty: 21 EACH | Refills: 0 | Status: SHIPPED | OUTPATIENT
Start: 2025-03-05

## 2025-03-05 RX ORDER — TIZANIDINE 2 MG/1
2 TABLET ORAL 2 TIMES DAILY PRN
Qty: 90 TABLET | Refills: 1 | Status: SHIPPED | OUTPATIENT
Start: 2025-03-05

## 2025-03-05 NOTE — TELEPHONE ENCOUNTER
Caller: Faith Yu    Relationship: Self    Best call back number: [phone]     What is the best time to reach you: ANYTIME     Who are you requesting to speak with (clinical staff, provider,  specific staff member): CLINICAL     What was the call regarding: PATIENT RECENTLY HAD MEDICATIONS SENT OVER TO THE PHARMACY FOR WHICH INSURANCE IS NO LONGER USING AND WILL NEED TO GO TO       [id number] [place], [place] - [address] AT [facility] OF [facility] [phone] [facility] [phone]  [phone]

## 2025-03-05 NOTE — PROGRESS NOTES
Chief Complaint  Neck Pain (Neck pain for a while. Pt has been taking Ibuprofen with some relief. Pt has had previous X-rays and MRI.)    Subjective            Faith Yu presents to [facility]  History of Present Illness  Patient is here today for persistent neck pain and she has been having to take some ibuprofen to get any relief whatsoever and she does report that she has had prior x-rays and MRI of the cervical spine in the past-remote past-and did show degenerative disc disease      History of Present Illness  The patient is a 69-year-old female who presents for evaluation of neck pain.    She reports experiencing severe pain in her neck and right arm posterior shoulder upper thoracic area in the rhomboid muscle area, which she attributes to nerve-related issues. The onset of these symptoms was in 10/2024, coinciding with a hospital visit where she was concerned  initially and suspected to have a stroke--the hospital did CT and MRI was ordered later everything was ruled out of the brain. During this visit, she was informed of an abnormality in her neck, leading to a recommendation for the use of a sponge neck brace while in the ER setting. She describes the pain as constant, radiating down her right side of the upper back, and accompanied by numbness and tingling. She also reports a burning sensation in this area and at times actually originates at the very base of the head into the cervical spine area and then becomes downward. Her pain is exacerbated by head movements, particularly turning to the right, and is associated with a popping sound in her neck. She rates her current pain level as 6 out of 10. She has not undergone any physical therapy for her symptoms and she has not had any recent imaging of the neck. She has a scheduled appointment with a neurologist next month. Despite the use of ibuprofen and a neck brace, she finds no relief. She recalls being  diagnosed with degenerative disc disease in the past, but does not remember the specific details. She has previously consulted with Dr. Bernstein, a neurosurgeon, in 2008 the patient does not report whether it was for the neck or for the lower back. She reports no history of headaches, dizziness, lightheadedness, seizures, or fainting.    Hypertension: Tolerate medication well no side effects no issues reported overall stable no chest pain shortness of breath syncopal spells dizziness or lightheadedness    MEDICATIONS  Current: ibuprofen      PHQ-2 Total Score:    PHQ-9 Total Score:      Past Medical History:   Diagnosis Date    Arthritis     Cataract     CTS (carpal tunnel syndrome) 20010    GERD (gastroesophageal reflux disease)     GERD (gastroesophageal reflux disease) 08/17/2021    Hyperlipidemia     Loss of appetite 08/17/2021    Nausea 08/17/2021    Parkinson's disease     Sciatica     Thoracic or lumbosacral neuritis or radiculitis        No Known Allergies     Past Surgical History:   Procedure Laterality Date    BACK SURGERY      LOW BACK DISC    CARPAL TUNNEL RELEASE      CATARACT EXTRACTION      COLONOSCOPY      ENDOSCOPY      ENDOSCOPY N/A 01/19/2022    Procedure: ESOPHAGOGASTRODUODENOSCOPY WITH BIOPSIES;  Surgeon: Luis Enrique Valenzuela MD;  Location: [facility] ENDOSCOPY;  Service: Gastroenterology;  Laterality: N/A;  HIATAL HERNIA    FOOT SURGERY      HYSTERECTOMY      DUE TO CANCER    SHOULDER SURGERY      TUBAL ABDOMINAL LIGATION      UPPER GASTROINTESTINAL ENDOSCOPY          Social History     Tobacco Use    Smoking status: Former     Current packs/day: 0.00     Average packs/day: 1 pack/day for 49.7 years (49.7 ttl pk-yrs)     Types: Cigarettes     Start date: 5/1/1972     Quit date: 1/26/2022     Years since quitting: 3.1     Passive exposure: Past    Smokeless tobacco: Never    Tobacco comments:     2 cigarettes per day   Vaping Use    Vaping status: Never Used   Substance Use Topics    Alcohol use:  Never    Drug use: Never       Family History   Problem Relation Age of Onset    Depression Mother     Hyperlipidemia Mother     Hypertension Mother     Alcohol abuse Father     Depression Sister     Hyperlipidemia Sister     Hypertension Sister     Osteoporosis Sister     Depression Brother     Hypertension Brother     Alcohol abuse Brother     Alcohol abuse Maternal Grandfather     Alcohol abuse Paternal Grandfather     Colon cancer Paternal Uncle     Malig Hyperthermia Neg Hx         There are no preventive care reminders to display for this patient.     Current Outpatient Medications on File Prior to Visit   Medication Sig    [DISCONTINUED] lisinopril (PRINIVIL,ZESTRIL) 10 MG tablet Take 1 tablet by mouth Daily.     No current facility-administered medications on file prior to visit.       Immunization History   Administered Date(s) Administered    COVID-19 (PFIZER) Purple Cap Monovalent 04/24/2021, 05/15/2021, 11/23/2021, 07/25/2022    FLUAD TRI 65YR+ 01/07/2014    Flu Vaccine Split Quad 11/17/2015, 10/11/2016    Fluad Quad 65+ 10/30/2023    Fluzone (or Fluarix & Flulaval for VFC) >6mos 11/17/2015, 10/11/2016    Fluzone High-Dose 65+YRS 10/31/2024    Fluzone High-Dose 65+yrs 10/25/2021    Fluzone Quad >6mos (Multi-dose) 10/23/2018    Influenza Seasonal Injectable 01/07/2014    Influenza, Unspecified 09/09/2014, 11/17/2015, 10/15/2020    Pneumococcal Conjugate 20-Valent (PCV20) 07/25/2023    Pneumococcal Polysaccharide (PPSV23) 07/23/2019    Tdap 07/19/2017       Review of Systems   Constitutional: Negative.  Negative for fatigue.   HENT: Negative.  Negative for trouble swallowing.    Eyes: Negative.    Respiratory:  Negative for shortness of breath.    Cardiovascular:  Negative for chest pain.   Gastrointestinal: Negative.  Negative for abdominal pain and blood in stool.   Endocrine: Negative.    Genitourinary: Negative.  Negative for dysuria.   Musculoskeletal:  Positive for arthralgias, myalgias and neck  "pain.   Neurological: Negative.  Negative for dizziness, seizures, syncope and light-headedness.   Psychiatric/Behavioral: Negative.  Negative for self-injury and suicidal ideas.         Objective     /80 (Patient Position: Sitting, Cuff Size: Adult)   Pulse 96   Temp 98 °F (36.7 °C) (Temporal)   Ht 143.5 cm (56.5\")   Wt 60.6 kg (133 lb 8 oz)   SpO2 97%   BMI 29.40 kg/m²       Physical Exam  Vitals and nursing note reviewed.   Constitutional:       Appearance: Normal appearance.   HENT:      Head: Normocephalic.      Right Ear: External ear normal.      Left Ear: External ear normal.      Nose: Nose normal.      Mouth/Throat:      Mouth: Mucous membranes are moist.   Eyes:      Pupils: Pupils are equal, round, and reactive to light.   Cardiovascular:      Rate and Rhythm: Normal rate.   Pulmonary:      Effort: Pulmonary effort is normal.   Abdominal:      Palpations: Abdomen is soft.   Musculoskeletal:         General: Tenderness present. No signs of injury.      Cervical back: Neck supple. Spasms, tenderness, bony tenderness and crepitus present. No signs of trauma. Pain with movement present. Decreased range of motion.        Back:    Skin:     General: Skin is warm and dry.   Neurological:      Mental Status: She is alert and oriented to person, place, and time.   Psychiatric:         Mood and Affect: Mood normal.         Behavior: Behavior normal.         Thought Content: Thought content normal.         Judgment: Judgment normal.         Result Review :     The following data was reviewed by: VEL Hernandez on 03/05/2025:           ED with Kel Hernandez MD (10/24/2024)   MRI Brain With & Without Contrast (12/10/2024 09:22)  CT Chest Low Dose Cancer Screening WO (11/15/2024 09:16)  XR Chest 1 View (10/24/2024 05:06)  CT Head Without Contrast (10/24/2024 05:04)  Results    XR Spine Cervical Complete 4 or 5 View (In Office) (03/05/2025 13:23)              Assessment and Plan      Diagnoses " and all orders for this visit:    1. Hypertension, unspecified type, new onset (Primary)  -     lisinopril (PRINIVIL,ZESTRIL) 10 MG tablet; Take 1 tablet by mouth Daily.  Dispense: 90 tablet; Refill: 0  -     CBC & Differential  -     Comprehensive Metabolic Panel  -     Lipid Panel  -     TSH Rfx On Abnormal To Free T4  -     Urinalysis With Culture If Indicated -    2. Fatigue, unspecified type  -     CBC & Differential  -     Comprehensive Metabolic Panel  -     TSH Rfx On Abnormal To Free T4  -     Vitamin B12 & Folate    3. DDD (degenerative disc disease), cervical  -     XR Spine Cervical Complete 4 or 5 View (In Office)  -     methylPREDNISolone (MEDROL) 4 MG dose pack; Take as directed on package instructions.  Dispense: 21 each; Refill: 0  -     tiZANidine (ZANAFLEX) 2 MG tablet; Take 1 tablet by mouth 2 (Two) Times a Day As Needed for Muscle Spasms.  Dispense: 90 tablet; Refill: 1  -     Ambulatory Referral to Physical Therapy for Evaluation & Treatment    4. Cervical radiculopathy  -     XR Spine Cervical Complete 4 or 5 View (In Office)  -     methylPREDNISolone (MEDROL) 4 MG dose pack; Take as directed on package instructions.  Dispense: 21 each; Refill: 0  -     tiZANidine (ZANAFLEX) 2 MG tablet; Take 1 tablet by mouth 2 (Two) Times a Day As Needed for Muscle Spasms.  Dispense: 90 tablet; Refill: 1  -     Ambulatory Referral to Physical Therapy for Evaluation & Treatment    5. Limited active range of motion (AROM) of cervical spine on rotation  -     XR Spine Cervical Complete 4 or 5 View (In Office)  -     methylPREDNISolone (MEDROL) 4 MG dose pack; Take as directed on package instructions.  Dispense: 21 each; Refill: 0  -     tiZANidine (ZANAFLEX) 2 MG tablet; Take 1 tablet by mouth 2 (Two) Times a Day As Needed for Muscle Spasms.  Dispense: 90 tablet; Refill: 1  -     Ambulatory Referral to Physical Therapy for Evaluation & Treatment    6. Rhomboid muscle pain  -     methylPREDNISolone (MEDROL) 4 MG  dose pack; Take as directed on package instructions.  Dispense: 21 each; Refill: 0  -     tiZANidine (ZANAFLEX) 2 MG tablet; Take 1 tablet by mouth 2 (Two) Times a Day As Needed for Muscle Spasms.  Dispense: 90 tablet; Refill: 1  -     Ambulatory Referral to Physical Therapy for Evaluation & Treatment    X-rays obtained today did appear to still have degenerative disc disease we will proceed with medication treatment as noted above conservatively and physical therapy as well    Should anything persist or worsen we will go ahead and order MRI if indicated    And medication refills on the antihypertensive medication showing very good control no chest pain shortness of breath syncopal episodes dizziness or lightheadedness and no side effects no issues reported overall stable-and then will stop back by fasting for labs      Assessment & Plan  1. Cervicalgia.  She reports severe pain in the neck and upper posterior right shoulder, which began in October 2024 and has persisted and worsened. The pain radiates down the right arm and is associated with numbness and tingling. She has been using ibuprofen and a neck brace for symptom management. There is no history of physical therapy for this condition. An updated x-ray of the cervical spine will be ordered to assess the current status. A muscle relaxer will be prescribed to alleviate muscle tightness. If the x-ray shows significant findings, further imaging such as an MRI may be considered. She is advised to follow up with a neurologist for further evaluation.          Follow Up     Return in about 5 months (around 8/7/2025), or if symptoms worsen or fail to improve, for Medicare Wellness, Recheck, fasting labs and refills.    Patient was given instructions and counseling regarding her condition or for health maintenance advice. Please see specific information pulled into the AVS if appropriate.            Faith Yu  reports that she quit smoking about 3 years  ago. Her smoking use included cigarettes. She started smoking about 52 years ago. She has a 49.7 pack-year smoking history. She has been exposed to tobacco smoke. She has never used smokeless tobacco. I have educated her on the risk of diseases from using tobacco products such as cancer, COPD, and heart disease.                Patient or patient representative verbalized consent for the use of Ambient Listening during the visit with  VEL Hernandez for chart documentation. 3/5/2025  13:18 EST

## 2025-03-18 ENCOUNTER — CLINICAL SUPPORT (OUTPATIENT)
Dept: FAMILY MEDICINE CLINIC | Facility: CLINIC | Age: 70
End: 2025-03-18
Payer: MEDICARE

## 2025-03-18 DIAGNOSIS — I10 HYPERTENSION, UNSPECIFIED TYPE: Primary | ICD-10-CM

## 2025-03-18 DIAGNOSIS — E83.52 HYPERCALCEMIA: Primary | ICD-10-CM

## 2025-03-18 DIAGNOSIS — D72.819 LEUKOPENIA, UNSPECIFIED TYPE: ICD-10-CM

## 2025-03-18 LAB
ALBUMIN SERPL-MCNC: 4.5 G/DL (ref 3.5–5.2)
ALBUMIN/GLOB SERPL: 1.6 G/DL
ALP SERPL-CCNC: 110 U/L (ref 39–117)
ALT SERPL W P-5'-P-CCNC: 13 U/L (ref 1–33)
ANION GAP SERPL CALCULATED.3IONS-SCNC: 10 MMOL/L (ref 5–15)
AST SERPL-CCNC: 15 U/L (ref 1–32)
BASOPHILS # BLD AUTO: 0.05 10*3/MM3 (ref 0–0.2)
BASOPHILS NFR BLD AUTO: 0.4 % (ref 0–1.5)
BILIRUB SERPL-MCNC: 0.2 MG/DL (ref 0–1.2)
BILIRUB UR QL STRIP: NEGATIVE
BUN SERPL-MCNC: 13 MG/DL (ref 8–23)
BUN/CREAT SERPL: 13.7 (ref 7–25)
CALCIUM SPEC-SCNC: 10.9 MG/DL (ref 8.6–10.5)
CHLORIDE SERPL-SCNC: 104 MMOL/L (ref 98–107)
CHOLEST SERPL-MCNC: 256 MG/DL (ref 0–200)
CLARITY UR: CLEAR
CO2 SERPL-SCNC: 26 MMOL/L (ref 22–29)
COLOR UR: YELLOW
CREAT SERPL-MCNC: 0.95 MG/DL (ref 0.57–1)
DEPRECATED RDW RBC AUTO: 45.8 FL (ref 37–54)
EGFRCR SERPLBLD CKD-EPI 2021: 64.6 ML/MIN/1.73
EOSINOPHIL # BLD AUTO: 0.13 10*3/MM3 (ref 0–0.4)
EOSINOPHIL NFR BLD AUTO: 1 % (ref 0.3–6.2)
ERYTHROCYTE [DISTWIDTH] IN BLOOD BY AUTOMATED COUNT: 12.8 % (ref 12.3–15.4)
FOLATE SERPL-MCNC: 9.97 NG/ML (ref 4.78–24.2)
GLOBULIN UR ELPH-MCNC: 2.9 GM/DL
GLUCOSE SERPL-MCNC: 107 MG/DL (ref 65–99)
GLUCOSE UR STRIP-MCNC: NEGATIVE MG/DL
HCT VFR BLD AUTO: 50.3 % (ref 34–46.6)
HDLC SERPL-MCNC: 52 MG/DL (ref 40–60)
HGB BLD-MCNC: 16.2 G/DL (ref 12–15.9)
HGB UR QL STRIP.AUTO: NEGATIVE
HOLD SPECIMEN: NORMAL
IMM GRANULOCYTES # BLD AUTO: 0.1 10*3/MM3 (ref 0–0.05)
IMM GRANULOCYTES NFR BLD AUTO: 0.8 % (ref 0–0.5)
KETONES UR QL STRIP: NEGATIVE
LDLC SERPL CALC-MCNC: 166 MG/DL (ref 0–100)
LDLC/HDLC SERPL: 3.13 {RATIO}
LEUKOCYTE ESTERASE UR QL STRIP.AUTO: NEGATIVE
LYMPHOCYTES # BLD AUTO: 3.14 10*3/MM3 (ref 0.7–3.1)
LYMPHOCYTES NFR BLD AUTO: 24 % (ref 19.6–45.3)
MCH RBC QN AUTO: 31.1 PG (ref 26.6–33)
MCHC RBC AUTO-ENTMCNC: 32.2 G/DL (ref 31.5–35.7)
MCV RBC AUTO: 96.5 FL (ref 79–97)
MONOCYTES # BLD AUTO: 0.89 10*3/MM3 (ref 0.1–0.9)
MONOCYTES NFR BLD AUTO: 6.8 % (ref 5–12)
NEUTROPHILS NFR BLD AUTO: 67 % (ref 42.7–76)
NEUTROPHILS NFR BLD AUTO: 8.75 10*3/MM3 (ref 1.7–7)
NITRITE UR QL STRIP: NEGATIVE
NRBC BLD AUTO-RTO: 0 /100 WBC (ref 0–0.2)
PH UR STRIP.AUTO: 5.5 [PH] (ref 5–8)
PLATELET # BLD AUTO: 287 10*3/MM3 (ref 140–450)
PMV BLD AUTO: 10 FL (ref 6–12)
POTASSIUM SERPL-SCNC: 5.3 MMOL/L (ref 3.5–5.2)
PROT SERPL-MCNC: 7.4 G/DL (ref 6–8.5)
PROT UR QL STRIP: NEGATIVE
RBC # BLD AUTO: 5.21 10*6/MM3 (ref 3.77–5.28)
SODIUM SERPL-SCNC: 140 MMOL/L (ref 136–145)
SP GR UR STRIP: 1.02 (ref 1–1.03)
TRIGL SERPL-MCNC: 205 MG/DL (ref 0–150)
TSH SERPL DL<=0.05 MIU/L-ACNC: 1.93 UIU/ML (ref 0.27–4.2)
UROBILINOGEN UR QL STRIP: NORMAL
VIT B12 BLD-MCNC: 403 PG/ML (ref 211–946)
VLDLC SERPL-MCNC: 38 MG/DL (ref 5–40)
WBC NRBC COR # BLD AUTO: 13.06 10*3/MM3 (ref 3.4–10.8)

## 2025-03-18 PROCEDURE — 85025 COMPLETE CBC W/AUTO DIFF WBC: CPT | Performed by: NURSE PRACTITIONER

## 2025-03-18 PROCEDURE — 82607 VITAMIN B-12: CPT | Performed by: NURSE PRACTITIONER

## 2025-03-18 PROCEDURE — 82746 ASSAY OF FOLIC ACID SERUM: CPT | Performed by: NURSE PRACTITIONER

## 2025-03-18 PROCEDURE — 81003 URINALYSIS AUTO W/O SCOPE: CPT | Performed by: NURSE PRACTITIONER

## 2025-03-18 PROCEDURE — 80061 LIPID PANEL: CPT | Performed by: NURSE PRACTITIONER

## 2025-03-18 PROCEDURE — 80053 COMPREHEN METABOLIC PANEL: CPT | Performed by: NURSE PRACTITIONER

## 2025-03-18 PROCEDURE — 84443 ASSAY THYROID STIM HORMONE: CPT | Performed by: NURSE PRACTITIONER

## 2025-03-18 PROCEDURE — 36415 COLL VENOUS BLD VENIPUNCTURE: CPT | Performed by: NURSE PRACTITIONER

## 2025-03-18 NOTE — PROGRESS NOTES
Venipuncture Blood Specimen Collection  Venipuncture performed in left arm by Karmen Dooley MA with good hemostasis. Patient tolerated the procedure well without complications.   03/18/25   Lesley Giraldo MA

## 2025-03-19 DIAGNOSIS — E78.5 DYSLIPIDEMIA: Primary | ICD-10-CM

## 2025-03-19 RX ORDER — ATORVASTATIN CALCIUM 10 MG/1
10 TABLET, FILM COATED ORAL DAILY
Qty: 90 TABLET | Refills: 0 | Status: SHIPPED | OUTPATIENT
Start: 2025-03-19 | End: 2025-03-24 | Stop reason: SDUPTHER

## 2025-03-21 DIAGNOSIS — E78.5 DYSLIPIDEMIA: ICD-10-CM

## 2025-03-21 RX ORDER — ATORVASTATIN CALCIUM 10 MG/1
10 TABLET, FILM COATED ORAL DAILY
Qty: 90 TABLET | Refills: 0 | OUTPATIENT
Start: 2025-03-21

## 2025-03-24 ENCOUNTER — TELEPHONE (OUTPATIENT)
Dept: FAMILY MEDICINE CLINIC | Facility: CLINIC | Age: 70
End: 2025-03-24
Payer: MEDICARE

## 2025-03-24 DIAGNOSIS — E78.5 DYSLIPIDEMIA: ICD-10-CM

## 2025-03-24 RX ORDER — ATORVASTATIN CALCIUM 10 MG/1
10 TABLET, FILM COATED ORAL DAILY
Qty: 90 TABLET | Refills: 0 | Status: SHIPPED | OUTPATIENT
Start: 2025-03-24

## 2025-04-09 ENCOUNTER — OFFICE VISIT (OUTPATIENT)
Dept: FAMILY MEDICINE CLINIC | Facility: CLINIC | Age: 70
End: 2025-04-09
Payer: MEDICARE

## 2025-04-09 VITALS
OXYGEN SATURATION: 96 % | SYSTOLIC BLOOD PRESSURE: 130 MMHG | BODY MASS INDEX: 28.05 KG/M2 | WEIGHT: 130 LBS | DIASTOLIC BLOOD PRESSURE: 78 MMHG | TEMPERATURE: 96.9 F | HEIGHT: 57 IN | HEART RATE: 83 BPM

## 2025-04-09 DIAGNOSIS — M54.12 CERVICAL RADICULOPATHY: ICD-10-CM

## 2025-04-09 DIAGNOSIS — J01.10 ACUTE NON-RECURRENT FRONTAL SINUSITIS: Primary | ICD-10-CM

## 2025-04-09 DIAGNOSIS — I10 HYPERTENSION, UNSPECIFIED TYPE: ICD-10-CM

## 2025-04-09 DIAGNOSIS — M50.30 DDD (DEGENERATIVE DISC DISEASE), CERVICAL: ICD-10-CM

## 2025-04-09 DIAGNOSIS — Z12.11 ENCOUNTER FOR SCREENING FOR MALIGNANT NEOPLASM OF COLON: ICD-10-CM

## 2025-04-09 DIAGNOSIS — M54.2 NECK PAIN: ICD-10-CM

## 2025-04-09 DIAGNOSIS — R05.1 ACUTE COUGH: ICD-10-CM

## 2025-04-09 DIAGNOSIS — E83.52 HYPERCALCEMIA: ICD-10-CM

## 2025-04-09 DIAGNOSIS — D72.819 LEUKOPENIA, UNSPECIFIED TYPE: ICD-10-CM

## 2025-04-09 DIAGNOSIS — R09.81 NASAL CONGESTION: ICD-10-CM

## 2025-04-09 DIAGNOSIS — R06.2 WHEEZING: ICD-10-CM

## 2025-04-09 LAB
ANION GAP SERPL CALCULATED.3IONS-SCNC: 12 MMOL/L (ref 5–15)
BUN SERPL-MCNC: 7 MG/DL (ref 8–23)
BUN/CREAT SERPL: 7.3 (ref 7–25)
CA-I SERPL ISE-MCNC: 1.32 MMOL/L (ref 1.15–1.35)
CALCIUM SPEC-SCNC: 10.2 MG/DL (ref 8.6–10.5)
CALCIUM SPEC-SCNC: 9.9 MG/DL (ref 8.6–10.5)
CHLORIDE SERPL-SCNC: 101 MMOL/L (ref 98–107)
CO2 SERPL-SCNC: 24 MMOL/L (ref 22–29)
CREAT SERPL-MCNC: 0.96 MG/DL (ref 0.57–1)
EGFRCR SERPLBLD CKD-EPI 2021: 63.8 ML/MIN/1.73
GLUCOSE SERPL-MCNC: 104 MG/DL (ref 65–99)
MAGNESIUM SERPL-MCNC: 2.3 MG/DL (ref 1.6–2.4)
PHOSPHATE SERPL-MCNC: 3 MG/DL (ref 2.5–4.5)
POTASSIUM SERPL-SCNC: 4 MMOL/L (ref 3.5–5.2)
PTH-INTACT SERPL-MCNC: 44.4 PG/ML (ref 15–65)
SODIUM SERPL-SCNC: 137 MMOL/L (ref 136–145)

## 2025-04-09 PROCEDURE — 83735 ASSAY OF MAGNESIUM: CPT | Performed by: NURSE PRACTITIONER

## 2025-04-09 PROCEDURE — 84155 ASSAY OF PROTEIN SERUM: CPT | Performed by: NURSE PRACTITIONER

## 2025-04-09 PROCEDURE — 82397 CHEMILUMINESCENT ASSAY: CPT | Performed by: NURSE PRACTITIONER

## 2025-04-09 PROCEDURE — 80048 BASIC METABOLIC PNL TOTAL CA: CPT | Performed by: NURSE PRACTITIONER

## 2025-04-09 PROCEDURE — 85027 COMPLETE CBC AUTOMATED: CPT | Performed by: NURSE PRACTITIONER

## 2025-04-09 PROCEDURE — 84100 ASSAY OF PHOSPHORUS: CPT | Performed by: NURSE PRACTITIONER

## 2025-04-09 PROCEDURE — 82330 ASSAY OF CALCIUM: CPT | Performed by: NURSE PRACTITIONER

## 2025-04-09 PROCEDURE — 84165 PROTEIN E-PHORESIS SERUM: CPT | Performed by: NURSE PRACTITIONER

## 2025-04-09 PROCEDURE — 83970 ASSAY OF PARATHORMONE: CPT | Performed by: NURSE PRACTITIONER

## 2025-04-09 RX ORDER — METHYLPREDNISOLONE SODIUM SUCCINATE 40 MG/ML
40 INJECTION INTRAMUSCULAR; INTRAVENOUS ONCE
Status: COMPLETED | OUTPATIENT
Start: 2025-04-09 | End: 2025-04-09

## 2025-04-09 RX ORDER — BENZONATATE 100 MG/1
200 CAPSULE ORAL 3 TIMES DAILY PRN
Qty: 42 CAPSULE | Refills: 0 | Status: SHIPPED | OUTPATIENT
Start: 2025-04-09 | End: 2025-04-16

## 2025-04-09 RX ADMIN — METHYLPREDNISOLONE SODIUM SUCCINATE 40 MG: 40 INJECTION INTRAMUSCULAR; INTRAVENOUS at 12:19

## 2025-04-09 NOTE — PROGRESS NOTES
Venipuncture Blood Specimen Collection  Venipuncture performed in la by Trisha Phelps MA with good hemostasis. Patient tolerated the procedure well without complications.   04/09/25   Trisha Phelps MA

## 2025-04-09 NOTE — PROGRESS NOTES
Chief Complaint  Cough (X 1 week, when she takes a deep breath she starts to cough), Nasal Congestion (And ears popping, nasal drainage is yellow), and Neck Pain (Still having neck pain, PT is making it worse, wants to go ahead and order an MRI)    The PHQ has not been completed during this encounter.         History of Present Illness:  Faith Yu is a 69 y.o. female who presents to [facility] with a past medical history of  Past Medical History:   Diagnosis Date    Arthritis     Cataract     Colon polyp 1985    CTS (carpal tunnel syndrome) 20010    GERD (gastroesophageal reflux disease)     GERD (gastroesophageal reflux disease) 08/17/2021    Hyperlipidemia     Loss of appetite 08/17/2021    Nausea 08/17/2021    Parkinson's disease     Sciatica     Thoracic or lumbosacral neuritis or radiculitis     Urinary incontinence 2021    Have had this problem for about two years and has gotten real bad now    Urinary tract infection 2022        History of Present Illness  The patient is a 69-year-old female who presents today for complaints of cough and sinus issues.    She has been experiencing symptoms suggestive of allergies, including a cough and nasal congestion with yellowish discharge upon blowing her nose. These symptoms have persisted for approximately one week. She also reports a sensation of blocked ears but does not experience any significant ear pain. She describes a popping sensation in her ears. She has not had any recent antibiotic use. She has been self-medicating with Allegra 24-hour and Robitussin. She has been using Flonase consistently and does not have Afrin at home. She has no history of smoking but reports exposure to secondhand smoke from her .    She continues to experience neck pain and is currently undergoing physical therapy. She has an upcoming appointment with a neurologist next month. She has been advised to discontinue calcium supplements  "due to elevated calcium levels detected in her blood work. She has not yet completed the recommended follow-up lab work.    She is due for colon cancer screening. She has a history of polyps, with 2 to 3 polyps found during each colonoscopy.    She has been diagnosed with high blood pressure and is currently on medication. She reports a severe episode of neck pain that resulted in a hospital visit, during which her blood pressure spiked to over 200. She was not administered any pain medication at the time.    Supplemental Information  She has a history of Parkinson's disease.    SOCIAL HISTORY  She does not smoke, but her  does.    MEDICATIONS  Current: Allegra, Robitussin, Flonase      Objective   Vital Signs:   Vitals:    04/09/25 1132   BP: 130/78   Pulse: 83   Temp: 96.9 °F (36.1 °C)   SpO2: 96%   Weight: 59 kg (130 lb)   Height: 143.5 cm (56.5\")     Body mass index is 28.63 kg/m².    Wt Readings from Last 3 Encounters:   04/09/25 59 kg (130 lb)   03/05/25 60.6 kg (133 lb 8 oz)   11/14/24 60.4 kg (133 lb 3.2 oz)     BP Readings from Last 3 Encounters:   04/09/25 130/78   03/05/25 124/80   11/14/24 130/70       Health Maintenance   Topic Date Due    COLORECTAL CANCER SCREENING  03/02/2025    ZOSTER VACCINE (1 of 2) 08/01/2025 (Originally 7/19/2004)    COVID-19 Vaccine (5 - 2024-25 season) 11/14/2025 (Originally 9/1/2024)    INFLUENZA VACCINE  07/01/2025    ANNUAL WELLNESS VISIT  08/05/2025    MAMMOGRAM  09/13/2025    DXA SCAN  09/13/2025    LUNG CANCER SCREENING  11/15/2025    LIPID PANEL  03/18/2026    TDAP/TD VACCINES (2 - Td or Tdap) 07/19/2027    HEPATITIS C SCREENING  Completed    Pneumococcal Vaccine 50+  Completed       Review of Systems   Physical Exam  Vitals reviewed.   Constitutional:       Appearance: Normal appearance.   HENT:      Right Ear: A middle ear effusion is present.      Left Ear: A middle ear effusion is present.      Nose: Congestion present.      Right Sinus: Frontal sinus " tenderness present.      Left Sinus: Frontal sinus tenderness present.      Mouth/Throat:      Pharynx: Posterior oropharyngeal erythema present.   Eyes:      Pupils: Pupils are equal, round, and reactive to light.   Cardiovascular:      Rate and Rhythm: Normal rate and regular rhythm.   Pulmonary:      Effort: Pulmonary effort is normal.      Breath sounds: Wheezing present.   Musculoskeletal:      Cervical back: Pain with movement present.   Skin:     General: Skin is warm and dry.   Neurological:      General: No focal deficit present.      Mental Status: She is alert and oriented to person, place, and time.   Psychiatric:         Mood and Affect: Mood normal.            Result Review :  The following data was reviewed by: VEL Wood on 04/09/2025:  Office Visit on 04/09/2025   Component Date Value    Magnesium 04/09/2025 2.3     PTH, Intact 04/09/2025 44.4     Calcium 04/09/2025 9.9     Ionized Calcium 04/09/2025 1.32     Total Protein 04/09/2025 6.8     Albumin 04/09/2025 3.6     Alpha-1-Globulin 04/09/2025 0.4     Alpha-2-Globulin 04/09/2025 1.2 (H)     Beta Globulin 04/09/2025 1.0     Gamma Globulin 04/09/2025 0.6     M-Kelvin 04/09/2025 Not Observed     Globulin 04/09/2025 3.2     A/G Ratio 04/09/2025 1.1     Please note 04/09/2025 Comment     Phosphorus 04/09/2025 3.0     Glucose 04/09/2025 104 (H)     BUN 04/09/2025 7 (L)     Creatinine 04/09/2025 0.96     Sodium 04/09/2025 137     Potassium 04/09/2025 4.0     Chloride 04/09/2025 101     CO2 04/09/2025 24.0     Calcium 04/09/2025 10.2     BUN/Creatinine Ratio 04/09/2025 7.3     Anion Gap 04/09/2025 12.0     eGFR 04/09/2025 63.8     WBC 04/09/2025 11.03 (H)     RBC 04/09/2025 4.72     Hemoglobin 04/09/2025 14.6     Hematocrit 04/09/2025 45.7     MCV 04/09/2025 96.8     MCH 04/09/2025 30.9     MCHC 04/09/2025 31.9     RDW 04/09/2025 12.3     RDW-SD 04/09/2025 43.8     MPV 04/09/2025 9.7     Platelets 04/09/2025 375        Results        Procedures         Assessment and Plan   Diagnoses and all orders for this visit:    1. Acute non-recurrent frontal sinusitis (Primary)  -     amoxicillin-clavulanate (AUGMENTIN) 875-125 MG per tablet; Take 1 tablet by mouth 2 (Two) Times a Day for 7 days.  Dispense: 14 tablet; Refill: 0    2. DDD (degenerative disc disease), cervical  -     MRI Cervical Spine Without Contrast; Future    3. Cervical radiculopathy  -     MRI Cervical Spine Without Contrast; Future    4. Neck pain  -     MRI Cervical Spine Without Contrast; Future    5. Acute cough  -     benzonatate (Tessalon Perles) 100 MG capsule; Take 2 capsules by mouth 3 (Three) Times a Day As Needed for Cough for up to 7 days.  Dispense: 42 capsule; Refill: 0    6. Nasal congestion    7. Encounter for screening for malignant neoplasm of colon  -     Ambulatory Referral For Screening Colonoscopy    8. Wheezing  -     methylPREDNISolone sodium succinate (SOLU-Medrol) injection 40 mg    9. Hypercalcemia  -     Magnesium  -     PTH, Intact & Calcium  -     PTH-related Peptide  -     Calcium, Ionized  -     Protein Electrophoresis, Total  -     Phosphorus  -     Basic Metabolic Panel    10. Leukopenia, unspecified type  -     CBC (No Diff)    11. Hypertension, unspecified type        Assessment & Plan  1. Sinusitis.  Symptoms include nasal congestion, yellowish nasal discharge, and a persistent cough for about a week. There is fluid in the ears, likely due to eustachian tube dysfunction. A Solu-Medrol injection will be administered to help with the wheezing and sinus inflammation. Augmentin 875 mg twice daily for one week has been prescribed. Benzonatate 100 mg, to be taken two capsules three times daily, has been prescribed for cough relief. She is advised to continue using Flonase but to hold off while using Afrin. Afrin nasal spray is recommended twice daily for no more than three days to clear the ear fluid. If symptoms persist after one to two weeks, a follow-up  appointment is advised.    2. Neck pain.  An MRI has been ordered to further investigate the cause of the neck pain. She is currently undergoing physical therapy. She is advised to maintain adequate hydration.    3. Hypertension.  She is currently on high blood pressure medication. The hypertension may be related to her neck pain.    4. Health maintenance.  She is due for colon cancer screening. A referral for a colonoscopy has been made.          FOLLOW UP  Return if symptoms worsen or fail to improve.    Patient was given instructions and counseling regarding her condition or for health maintenance advice. Please see specific information pulled into the AVS if appropriate.       VEL Wood  04/10/25  16:53 EDT    CURRENT & DISCONTINUED MEDICATIONS  Current Outpatient Medications   Medication Instructions    amoxicillin-clavulanate (AUGMENTIN) 875-125 MG per tablet 1 tablet, Oral, 2 Times Daily    atorvastatin (LIPITOR) 10 mg, Oral, Daily    benzonatate (TESSALON PERLES) 200 mg, Oral, 3 Times Daily PRN    lisinopril (PRINIVIL,ZESTRIL) 10 mg, Oral, Daily    tiZANidine (ZANAFLEX) 2 mg, Oral, 2 Times Daily PRN       Medications Discontinued During This Encounter   Medication Reason    methylPREDNISolone (MEDROL) 4 MG dose pack *Therapy completed        EMR Dragon/Transcription disclaimer:  Parts of this encounter note are electronic transcription/translation of spoken language to printed text.     Patient or patient representative verbalized consent for the use of Ambient Listening during the visit with  VEL Wood for chart documentation. 4/10/2025  16:53 EDT

## 2025-04-10 ENCOUNTER — RESULTS FOLLOW-UP (OUTPATIENT)
Dept: FAMILY MEDICINE CLINIC | Facility: CLINIC | Age: 70
End: 2025-04-10
Payer: MEDICARE

## 2025-04-10 LAB
ALBUMIN SERPL ELPH-MCNC: 3.6 G/DL (ref 2.9–4.4)
ALBUMIN/GLOB SERPL: 1.1 {RATIO} (ref 0.7–1.7)
ALPHA1 GLOB SERPL ELPH-MCNC: 0.4 G/DL (ref 0–0.4)
ALPHA2 GLOB SERPL ELPH-MCNC: 1.2 G/DL (ref 0.4–1)
B-GLOBULIN SERPL ELPH-MCNC: 1 G/DL (ref 0.7–1.3)
DEPRECATED RDW RBC AUTO: 43.8 FL (ref 37–54)
ERYTHROCYTE [DISTWIDTH] IN BLOOD BY AUTOMATED COUNT: 12.3 % (ref 12.3–15.4)
GAMMA GLOB SERPL ELPH-MCNC: 0.6 G/DL (ref 0.4–1.8)
GLOBULIN SER CALC-MCNC: 3.2 G/DL (ref 2.2–3.9)
HCT VFR BLD AUTO: 45.7 % (ref 34–46.6)
HGB BLD-MCNC: 14.6 G/DL (ref 12–15.9)
LABORATORY COMMENT REPORT: ABNORMAL
M PROTEIN SERPL ELPH-MCNC: ABNORMAL G/DL
MCH RBC QN AUTO: 30.9 PG (ref 26.6–33)
MCHC RBC AUTO-ENTMCNC: 31.9 G/DL (ref 31.5–35.7)
MCV RBC AUTO: 96.8 FL (ref 79–97)
PLATELET # BLD AUTO: 375 10*3/MM3 (ref 140–450)
PMV BLD AUTO: 9.7 FL (ref 6–12)
PROT SERPL-MCNC: 6.8 G/DL (ref 6–8.5)
RBC # BLD AUTO: 4.72 10*6/MM3 (ref 3.77–5.28)
WBC NRBC COR # BLD AUTO: 11.03 10*3/MM3 (ref 3.4–10.8)

## 2025-04-10 NOTE — LETTER
Faith Yu  [address] [place] [address] [place] [address]    April 10, 2025     Dear Ms. Yu:    Below are the results from your recent visit:    Please mail letter to patient stating     Faith the blood counts still show just a borderline elevated white blood cell count at 11.03 and it should be 10.80 or lower and it is better than it was 3 weeks ago     And then the basic metabolic panel shows glucose 104 normal kidney function and normal electrolytes and the magnesium was normal and the phosphorus was normal and the parathyroid hormone intact and calcium levels normal and the ionized calcium normal and I am still awaiting just a couple of other labs             Resulted Orders   Magnesium   Result Value Ref Range    Magnesium 2.3 1.6 - 2.4 mg/dL   PTH, Intact & Calcium   Result Value Ref Range    PTH, Intact 44.4 15.0 - 65.0 pg/mL    Calcium 9.9 8.6 - 10.5 mg/dL   Calcium, Ionized   Result Value Ref Range    Ionized Calcium 1.32 1.15 - 1.35 mmol/L   Phosphorus   Result Value Ref Range    Phosphorus 3.0 2.5 - 4.5 mg/dL   Basic Metabolic Panel   Result Value Ref Range    Glucose 104 (H) 65 - 99 mg/dL    BUN 7 (L) 8 - 23 mg/dL    Creatinine 0.96 0.57 - 1.00 mg/dL    Sodium 137 136 - 145 mmol/L    Potassium 4.0 3.5 - 5.2 mmol/L    Chloride 101 98 - 107 mmol/L    CO2 24.0 22.0 - 29.0 mmol/L    Calcium 10.2 8.6 - 10.5 mg/dL    BUN/Creatinine Ratio 7.3 7.0 - 25.0    Anion Gap 12.0 5.0 - 15.0 mmol/L    eGFR 63.8 >60.0 mL/min/1.73   CBC (No Diff)   Result Value Ref Range    WBC 11.03 (H) 3.40 - 10.80 10*3/mm3    RBC 4.72 3.77 - 5.28 10*6/mm3    Hemoglobin 14.6 12.0 - 15.9 g/dL    Hematocrit 45.7 34.0 - 46.6 %    MCV 96.8 79.0 - 97.0 fL    MCH 30.9 26.6 - 33.0 pg    MCHC 31.9 31.5 - 35.7 g/dL    RDW 12.3 12.3 - 15.4 %    RDW-SD 43.8 37.0 - 54.0 fl    MPV 9.7 6.0 - 12.0 fL    Platelets 375 140 - 450 10*3/mm3         If you have any questions or concerns, please don't hesitate to call.         Sincerely,        Rosalba  Bairon Smith, APRN

## 2025-04-10 NOTE — PROGRESS NOTES
Please mail letter to patient stating    Faith the blood counts still show just a borderline elevated white blood cell count at 11.03 and it should be 10.80 or lower and it is better than it was 3 weeks ago    And then the basic metabolic panel shows glucose 104 normal kidney function and normal electrolytes and the magnesium was normal and the phosphorus was normal and the parathyroid hormone intact and calcium levels normal and the ionized calcium normal and I am still awaiting just a couple of other labs

## 2025-04-10 NOTE — LETTER
Faith Yu  [address] [place] [address]    April 11, 2025     Dear Ms. Yu:    Faith the protein electrophoresis panel was all normal with the exception of the alpha-2 globulin which was at 1.2 and normal ranges 0.4-1.0 and then with your white blood count still being modestly elevated I would recommend seeing hematology for further evaluation to make sure that no further workup is indicated-please let me know if you are okay with me proceeding with this referral       Resulted Orders   Magnesium   Result Value Ref Range    Magnesium 2.3 1.6 - 2.4 mg/dL   PTH, Intact & Calcium   Result Value Ref Range    PTH, Intact 44.4 15.0 - 65.0 pg/mL    Calcium 9.9 8.6 - 10.5 mg/dL   Calcium, Ionized   Result Value Ref Range    Ionized Calcium 1.32 1.15 - 1.35 mmol/L   Protein Electrophoresis, Total   Result Value Ref Range    Total Protein 6.8 6.0 - 8.5 g/dL    Albumin 3.6 2.9 - 4.4 g/dL    Alpha-1-Globulin 0.4 0.0 - 0.4 g/dL    Alpha-2-Globulin 1.2 (H) 0.4 - 1.0 g/dL    Beta Globulin 1.0 0.7 - 1.3 g/dL    Gamma Globulin 0.6 0.4 - 1.8 g/dL    M-Kelvin Not Observed Not Observed g/dL    Globulin 3.2 2.2 - 3.9 g/dL    A/G Ratio 1.1 0.7 - 1.7    Please note Comment       Comment:      Protein electrophoresis scan will follow via computer, mail, or   delivery.   Phosphorus   Result Value Ref Range    Phosphorus 3.0 2.5 - 4.5 mg/dL   Basic Metabolic Panel   Result Value Ref Range    Glucose 104 (H) 65 - 99 mg/dL    BUN 7 (L) 8 - 23 mg/dL    Creatinine 0.96 0.57 - 1.00 mg/dL    Sodium 137 136 - 145 mmol/L    Potassium 4.0 3.5 - 5.2 mmol/L    Chloride 101 98 - 107 mmol/L    CO2 24.0 22.0 - 29.0 mmol/L    Calcium 10.2 8.6 - 10.5 mg/dL    BUN/Creatinine Ratio 7.3 7.0 - 25.0    Anion Gap 12.0 5.0 - 15.0 mmol/L    eGFR 63.8 >60.0 mL/min/1.73   CBC (No Diff)   Result Value Ref Range    WBC 11.03 (H) 3.40 - 10.80 10*3/mm3    RBC 4.72 3.77 - 5.28 10*6/mm3    Hemoglobin 14.6 12.0 - 15.9 g/dL    Hematocrit 45.7  34.0 - 46.6 %    MCV 96.8 79.0 - 97.0 fL    MCH 30.9 26.6 - 33.0 pg    MCHC 31.9 31.5 - 35.7 g/dL    RDW 12.3 12.3 - 15.4 %    RDW-SD 43.8 37.0 - 54.0 fl    MPV 9.7 6.0 - 12.0 fL    Platelets 375 140 - 450 10*3/mm3         If you have any questions or concerns, please don't hesitate to call.         Sincerely,        VEL Hernandez

## 2025-04-11 NOTE — PROGRESS NOTES
Please mail letter to patient stating    Faith the protein electrophoresis panel was all normal with the exception of the alpha-2 globulin which was at 1.2 and normal ranges 0.4-1.0 and then with your white blood count still being modestly elevated I would recommend seeing hematology for further evaluation to make sure that no further workup is indicated-please let me know if you are okay with me proceeding with this referral

## 2025-04-17 DIAGNOSIS — D72.829 LEUKOCYTOSIS, UNSPECIFIED TYPE: Primary | ICD-10-CM

## 2025-04-17 DIAGNOSIS — R77.8 ABNORMAL SERUM PROTEIN ELECTROPHORESIS: ICD-10-CM

## 2025-04-17 LAB — PTH RELATED PROT SERPL-SCNC: <2 PMOL/L

## 2025-04-29 NOTE — PROGRESS NOTES
Chief Complaint/Reason for Referral:  Leukocytosis    Rosalba Smith, A*  Rosalba Smith, APRN    Records Obtained:  Records of the patients history including those obtained from [facility] EMR were reviewed and summarized in detail.    Subjective    History of Present Illness    Faith Yu 69 y.o. presents to [facility] HEMATOLOGY [facility] ONCOLOGY for Leukocytosis    History of Present Illness  The patient is a 69-year-old female who presents to the hematology oncology department as a new patient for further evaluation of leukocytosis.    Fatigue and intermittent night sweats have been ongoing for approximately 6 months. Exhaustion is reported as severe, impacting daily activities. Night sweats occur occasionally, leading to waking up drenched. She was referred to this clinic to rule out any blood cancer.     A history of elevated calcium levels is noted, although no associated issues have been experienced. An episode of severe neck pain and spasm rendered her immobile, prompting a hospital visit. An MRI of the brain revealed some abnormalities, but no specific focus on the neck was made. An x-ray subsequently revealed significant findings, and an MRI was recommended.    She has a family history of leukemia in a cousin and a grandson who  of brain cancer. A previous suspicion of lymphoma during a colonoscopy due to the presence of stomach nodules was later ruled out.    A lesion on her left leg is suspected to be skin cancer. The lesion is mildly pruritic and bleeds when manipulated. She does not have a dermatologist.    She was diagnosed with Parkinson's disease 2 years ago, which has been associated with progressive fatigue.    FAMILY HISTORY  The patient had a cousin who  of leukemia. Her 17-year-old grandson  of brain cancer (pineoblastoma) 7 years ago.     Cancer-related family history includes Colon cancer in her paternal uncle.     Oncology/Hematology History    No  history exists.     Review of Systems   Constitutional:  Negative for appetite change, diaphoresis, fatigue, fever, unexpected weight gain and unexpected weight loss.   HENT:  Negative for hearing loss, mouth sores, sore throat, swollen glands, trouble swallowing and voice change.    Eyes:  Negative for blurred vision.   Respiratory:  Negative for cough, shortness of breath and wheezing.    Cardiovascular:  Negative for chest pain and palpitations.   Gastrointestinal:  Negative for abdominal pain, blood in stool, constipation, diarrhea, nausea and vomiting.   Endocrine: Negative for cold intolerance and heat intolerance.   Genitourinary:  Negative for difficulty urinating, dysuria, frequency, hematuria and urinary incontinence.   Musculoskeletal:  Negative for arthralgias, back pain and myalgias.   Skin:  Negative for rash, skin lesions and wound.   Neurological:  Negative for dizziness, seizures, weakness, numbness and headache.   Hematological:  Does not bruise/bleed easily.   Psychiatric/Behavioral:  Negative for depressed mood. The patient is not nervous/anxious.      Current Outpatient Medications on File Prior to Visit   Medication Sig Dispense Refill    atorvastatin (LIPITOR) 10 MG tablet Take 1 tablet by mouth Daily. 90 tablet 0    lisinopril (PRINIVIL,ZESTRIL) 10 MG tablet Take 1 tablet by mouth Daily. 90 tablet 0    tiZANidine (ZANAFLEX) 2 MG tablet Take 1 tablet by mouth 2 (Two) Times a Day As Needed for Muscle Spasms. 90 tablet 1     No current facility-administered medications on file prior to visit.     No Known Allergies  Past Medical History:   Diagnosis Date    Arthritis     Cataract     Colon polyp 1985    CTS (carpal tunnel syndrome) 20010    GERD (gastroesophageal reflux disease)     GERD (gastroesophageal reflux disease) 08/17/2021    Hyperlipidemia     Loss of appetite 08/17/2021    Nausea 08/17/2021    Parkinson's disease     Sciatica     Thoracic or lumbosacral neuritis or radiculitis      Urinary incontinence 2021    Have had this problem for about two years and has gotten real bad now    Urinary tract infection 2022     Past Surgical History:   Procedure Laterality Date    BACK SURGERY      LOW BACK DISC    BLADDER SURGERY  9/11 2001    CARPAL TUNNEL RELEASE      CATARACT EXTRACTION      COLONOSCOPY      ENDOSCOPY      ENDOSCOPY N/A 01/19/2022    Procedure: ESOPHAGOGASTRODUODENOSCOPY WITH BIOPSIES;  Surgeon: Luis Enrique Valenzuela MD;  Location: [facility] ENDOSCOPY;  Service: Gastroenterology;  Laterality: N/A;  HIATAL HERNIA    FOOT SURGERY      HYSTERECTOMY      DUE TO CANCER    SHOULDER SURGERY      TUBAL ABDOMINAL LIGATION      UPPER GASTROINTESTINAL ENDOSCOPY       Social History     Socioeconomic History    Marital status:    Tobacco Use    Smoking status: Former     Current packs/day: 0.00     Average packs/day: 1 pack/day for 49.7 years (49.7 ttl pk-yrs)     Types: Cigarettes     Start date: 5/1/1972     Quit date: 1/26/2022     Years since quitting: 3.2     Passive exposure: Past    Smokeless tobacco: Never    Tobacco comments:     2 cigarettes per day   Vaping Use    Vaping status: Never Used   Substance and Sexual Activity    Alcohol use: Never    Drug use: Never    Sexual activity: Yes     Partners: Male     Birth control/protection: Tubal ligation, Hysterectomy     Family History   Problem Relation Age of Onset    Depression Mother     Hyperlipidemia Mother     Hypertension Mother     Alcohol abuse Father     Depression Sister     Hyperlipidemia Sister     Hypertension Sister     Osteoporosis Sister     Depression Brother     Hypertension Brother     Alcohol abuse Brother     Alcohol abuse Maternal Grandfather     Dementia Maternal Grandfather     Alcohol abuse Paternal Grandfather     Colon cancer Paternal Uncle     Dementia Maternal Aunt     Malig Hyperthermia Neg Hx      Immunization History   Administered Date(s) Administered    COVID-19 (PFIZER) Purple Cap Monovalent  "04/24/2021, 05/15/2021, 11/23/2021, 07/25/2022    FLUAD TRI 65YR+ 01/07/2014    Flu Vaccine Split Quad 11/17/2015, 10/11/2016    Fluad Quad 65+ 10/30/2023    Fluzone (or Fluarix & Flulaval for VFC) >6mos 11/17/2015, 10/11/2016    Fluzone High-Dose 65+YRS 10/31/2024    Fluzone High-Dose 65+yrs 10/25/2021    Fluzone Quad >6mos (Multi-dose) 10/23/2018    Influenza Seasonal Injectable 01/07/2014    Influenza, Unspecified 09/09/2014, 11/17/2015, 10/15/2020    Pneumococcal Conjugate 20-Valent (PCV20) 07/25/2023    Pneumococcal Polysaccharide (PPSV23) 07/23/2019    Tdap 07/19/2017     Tobacco Use: Medium Risk (4/30/2025)    Patient History     Smoking Tobacco Use: Former     Smokeless Tobacco Use: Never     Passive Exposure: Past     Objective   Vitals:    04/30/25 1025   BP: 121/67   Pulse: 67   Resp: 18   Temp: 97.9 °F (36.6 °C)   TempSrc: Oral   SpO2: 96%   Weight: 58.7 kg (129 lb 6.6 oz)   Height: 143.5 cm (56.5\")   PainSc: 0-No pain      Physical Exam  Vitals and nursing note reviewed.   Constitutional:       General: She is not in acute distress.     Appearance: Normal appearance. She is not ill-appearing.   HENT:      Head: Normocephalic.   Eyes:      Conjunctiva/sclera: Conjunctivae normal.   Cardiovascular:      Rate and Rhythm: Normal rate and regular rhythm.      Heart sounds: Normal heart sounds.   Pulmonary:      Effort: Pulmonary effort is normal.      Breath sounds: Normal breath sounds.   Skin:     Coloration: Skin is not jaundiced.   Neurological:      Mental Status: She is alert.   Psychiatric:         Mood and Affect: Mood normal.         Behavior: Behavior normal. Behavior is cooperative.         Thought Content: Thought content normal.         Judgment: Judgment normal.       Wt Readings from Last 3 Encounters:   04/30/25 58.7 kg (129 lb 6.6 oz)   04/09/25 59 kg (130 lb)   03/05/25 60.6 kg (133 lb 8 oz)      Body mass index is 28.51 kg/m².    ECOG score: 0         ECOG: (0) Fully Active - Able to Carry " On All Pre-disease Performance Without Restriction  Fall Risk Assessment was completed, and patient is at low risk for falls.  PHQ-9 Total Score:         The patient is  experiencing fatigue. Fatigue score: 6    PT/OT Functional Screening:   Speech Functional Screening:   Rehab to be ordered:     Vitals:    04/30/25 1025   PainSc: 0-No pain           Faith Yu reports a pain score of 0.       PHQ-2 Depression Screening  Little interest or pleasure in doing things?     Feeling down, depressed, or hopeless?     PHQ-2 Total Score       Result Review :  The following data was reviewed by: Lizandro Lyn PA-C on 04/30/2025:  RED BLOOD CELLs:  Lab Results   Component Value Date    RBC 4.72 04/09/2025    HGB 14.6 04/09/2025    HCT 45.7 04/09/2025    MCV 96.8 04/09/2025     04/09/2025      WHITE BLOOD CELLs:  Lab Results   Component Value Date    WBC 11.03 (H) 04/09/2025    NEUTROABS 8.75 (H) 03/18/2025    LYMPHSABS 3.14 (H) 03/18/2025    EOSABS 0.13 03/18/2025    BASOSABS 0.05 03/18/2025     RBC EVALUATION (MICROCYTOSIS/NORMOCYTOSIS):  Lab Results   Component Value Date    MCV 96.8 04/09/2025    IRON 84 01/25/2022    FERRITIN 68.80 01/25/2022    LABIRON 25 01/25/2022    TIBC 340 01/25/2022    TRANSFERRIN 228 01/25/2022     HEMOLYSIS EVALUATION:  Lab Results   Component Value Date    MCV 96.8 04/09/2025     C-Reactive Protein   Date Value Ref Range Status   05/26/2021 <3.00 0.00 - 5.00 mg/L Final     Comment:     In very rare cases, gammopathy, in particular type IgM  (Waldenstrom's macroglobulinemia), may cause unreliable results.       MACROCYTOSIS EVALUATION:  Lab Results   Component Value Date    MCV 96.8 04/09/2025    [id number] 403 03/18/2025    FOLATE 9.97 03/18/2025     RENAL FUNCTION TESTs:  Lab Results   Component Value Date    EGFR 63.8 04/09/2025    BUN 7 (L) 04/09/2025     LIVER FUNCTION TESTs:  Lab Results   Component Value Date    ALT 13 03/18/2025    AST 15 03/18/2025    BILITOT 0.2  03/18/2025    ALKPHOS 110 03/18/2025    PROTEINTOT 6.8 04/09/2025    ALBUMIN 3.6 04/09/2025     GLUCOSE EVALUATION:  Lab Results   Component Value Date    GLUCOSE 104 (H) 04/09/2025     SERUM CHEMISTRIES:  Lab Results   Component Value Date     04/09/2025    K 4.0 04/09/2025     04/09/2025    CO2 24.0 04/09/2025    CALCIUM 9.9 04/09/2025    CALCIUM 10.2 04/09/2025     URINALYSIS:  Lab Results   Component Value Date    RBCUR Trace (A) 09/12/2023    LEUKOCYTESUR Negative 03/18/2025    BILIRUBINUR Negative 03/18/2025    PHUR 5.5 03/18/2025    SPECGRAVUR 1.016 03/18/2025     THYROID FUNCTION TESTs:  TSH   Date Value Ref Range Status   03/18/2025 1.930 0.270 - 4.200 uIU/mL Final     Free T4   Date Value Ref Range Status   05/26/2021 1.1 0.9 - 1.8 ng/dL Final     XR Spine Cervical Complete 4 or 5 View (In Office)  Result Date: 3/9/2025  Impression: 1.Multilevel degenerative change. 2.Depending on clinical findings additional workup with MR may be of benefit to reassess this patient. Electronically Signed: Jay Jay Taylor MD  3/9/2025 6:37 PM EDT  Workstation ID: [id number]    CT ABDOMEN PELVIS W CONTRAST (12/26/2023 12:07)   Results  Labs   - White blood cell count: 9.0   - White blood cell count: 7.2   - White blood cell count: 11.2   - White blood cell count: 13.0   - White blood cell count: 11.0   - Red blood cell count: Normal   - Platelets: Normal   - Calcium ionized: Normal   - PTH: Normal   - Calcium: 10.9   - EGFR: 64   - Total protein: Normal   - ALT: Normal   - AST: Normal   - Alkaline phosphatase: Normal   - Blood glucose: 107   - Thyroid test: 1.9   - Vitamin B12: 403   - Folate: 9.9   - Magnesium: 2.3   - Phosphorus: 3.0       Assessment and Plan:  Diagnoses and all orders for this visit:    1. Leukocytosis, unspecified type (Primary)  -     CBC Auto Differential  -     Lactate Dehydrogenase  -     Comprehensive Metabolic Panel  -     Peripheral Blood Smear  -     Uric Acid  -     Sedimentation Rate  -      C-reactive Protein  -     EBV Ab Panel  -     CMV IgG IgM  -     Flow Cytometry ([facility])  -     Free K+L Left Chains,Qn,Ur - Urine, Clean Catch  -     Immunofixation (MATTHEW), Protein Electrophoresis (PE), and Quantitative Free Kappa and Lambda Light Chains (FLC), Serum  -     MATTHEW & PE, Random Urine - Urine, Clean Catch    2. Neutrophilia  -     CBC Auto Differential  -     Lactate Dehydrogenase  -     Comprehensive Metabolic Panel  -     Peripheral Blood Smear  -     Uric Acid  -     Sedimentation Rate  -     C-reactive Protein  -     EBV Ab Panel  -     CMV IgG IgM  -     Flow Cytometry ([facility])    3. Lymphocytosis  -     CBC Auto Differential  -     Lactate Dehydrogenase  -     Comprehensive Metabolic Panel  -     Peripheral Blood Smear  -     Uric Acid  -     Sedimentation Rate  -     C-reactive Protein  -     EBV Ab Panel  -     CMV IgG IgM  -     Flow Cytometry ([facility])    4. Hypercalcemia  -     Free K+L Left Chains,Qn,Ur - Urine, Clean Catch  -     Immunofixation (MATTHEW), Protein Electrophoresis (PE), and Quantitative Free Kappa and Lambda Light Chains (FLC), Serum  -     MATTHEW & PE, Random Urine - Urine, Clean Catch      Assessment & Plan  Leukocytosis  - Elevated white blood cell count for approximately 6 months, primarily neutrophils and lymphocytes  - Discussed possibility of chronic leukemia developing over the next decade  - Conduct flow cytometry test to assess characteristics of white blood cells  - Perform additional blood tests and urine test to check for abnormal proteins  - Goal: Determine nature of leukocytosis and guide further management  - Minimal side effects of blood tests, including discomfort at blood draw site  - Supportive care: Monitor symptoms and ensure patient remains informed and comfortable    Skin lesion  - Skin lesion on leg that itches and sometimes bleeds due to picking  - Consultation with Vianca for potential biopsy or referral to dermatology  advised  - Goal: Determine if lesion is malignant and provide appropriate treatment  - Risks of biopsy: Minor bleeding and infection at biopsy site  - Alternatives: Monitor lesion for changes and seek dermatological evaluation    Elevated calcium levels  - Elevated calcium levels, last reading at 10.9 (normal 10.5)  - Continue PCP  - Obtain Serum and Urine MATTHEW/PE/FLC    -Encouraged patient to remain up to date on all recommended preventative medicine services specific for their sex and age.  Some examples include:  Diabetes screening, Hypertensive screening, Immunizations, Lipid screenings (cholesterol), Depression screenings, Colorectal cancer screening, HIV screening, Cervical cancer screening, Breast cancer screening, Osteoporosis screening, Alcohol screening, Dental screening, Tobacco Use screening and Dietary screening    Patient Follow Up: 6-8 weeks with MD HOFF spent 45 minutes caring for Faith on this date of service. This time includes time spent by me in the following activities:preparing for the visit, reviewing tests, obtaining and/or reviewing a separately obtained history, performing a medically appropriate examination and/or evaluation , counseling and educating the patient/family/caregiver, ordering medications, tests, or procedures, documenting information in the medical record, independently interpreting results and communicating that information with the patient/family/caregiver, and care coordination    Patient was given instructions and counseling regarding her condition or for health maintenance advice. Please see specific information pulled into the AVS if appropriate.     Patient or patient representative verbalized consent for the use of Ambient Listening during the visit with  Liznadro Lyn PA-C for chart documentation. 4/30/2025  10:52 EDT

## 2025-04-30 ENCOUNTER — CONSULT (OUTPATIENT)
Dept: ONCOLOGY | Facility: HOSPITAL | Age: 70
End: 2025-04-30
Payer: MEDICARE

## 2025-04-30 ENCOUNTER — LAB (OUTPATIENT)
Facility: HOSPITAL | Age: 70
End: 2025-04-30
Payer: MEDICARE

## 2025-04-30 VITALS
WEIGHT: 129.41 LBS | SYSTOLIC BLOOD PRESSURE: 121 MMHG | TEMPERATURE: 97.9 F | RESPIRATION RATE: 18 BRPM | OXYGEN SATURATION: 96 % | HEART RATE: 67 BPM | HEIGHT: 56 IN | DIASTOLIC BLOOD PRESSURE: 67 MMHG | BODY MASS INDEX: 29.11 KG/M2

## 2025-04-30 DIAGNOSIS — D72.829 LEUKOCYTOSIS, UNSPECIFIED TYPE: Primary | ICD-10-CM

## 2025-04-30 DIAGNOSIS — D72.820 LYMPHOCYTOSIS: ICD-10-CM

## 2025-04-30 DIAGNOSIS — E83.52 HYPERCALCEMIA: ICD-10-CM

## 2025-04-30 DIAGNOSIS — D72.828 NEUTROPHILIA: ICD-10-CM

## 2025-04-30 LAB
ALBUMIN SERPL-MCNC: 4.8 G/DL (ref 3.5–5.2)
ALBUMIN/GLOB SERPL: 1.8 G/DL
ALP SERPL-CCNC: 116 U/L (ref 39–117)
ALT SERPL W P-5'-P-CCNC: 9 U/L (ref 1–33)
ANION GAP SERPL CALCULATED.3IONS-SCNC: 12.3 MMOL/L (ref 5–15)
AST SERPL-CCNC: 17 U/L (ref 1–32)
BILIRUB SERPL-MCNC: 0.3 MG/DL (ref 0–1.2)
BUN SERPL-MCNC: 11 MG/DL (ref 8–23)
BUN/CREAT SERPL: 13.6 (ref 7–25)
CALCIUM SPEC-SCNC: 10.5 MG/DL (ref 8.6–10.5)
CHLORIDE SERPL-SCNC: 104 MMOL/L (ref 98–107)
CO2 SERPL-SCNC: 23.7 MMOL/L (ref 22–29)
CREAT SERPL-MCNC: 0.81 MG/DL (ref 0.57–1)
CRP SERPL-MCNC: <0.3 MG/DL (ref 0–0.5)
DEPRECATED RDW RBC AUTO: 46.1 FL (ref 37–54)
EGFRCR SERPLBLD CKD-EPI 2021: 78.2 ML/MIN/1.73
ERYTHROCYTE [DISTWIDTH] IN BLOOD BY AUTOMATED COUNT: 12.9 % (ref 12.3–15.4)
ERYTHROCYTE [SEDIMENTATION RATE] IN BLOOD: 22 MM/HR (ref 0–30)
GLOBULIN UR ELPH-MCNC: 2.6 GM/DL
GLUCOSE SERPL-MCNC: 115 MG/DL (ref 65–99)
HCT VFR BLD AUTO: 44.6 % (ref 34–46.6)
HGB BLD-MCNC: 14.8 G/DL (ref 12–15.9)
LDH SERPL-CCNC: 170 U/L (ref 135–214)
LYMPHOCYTES # BLD MANUAL: 2.35 10*3/MM3 (ref 0.7–3.1)
LYMPHOCYTES NFR BLD MANUAL: 7 % (ref 5–12)
MCH RBC QN AUTO: 32 PG (ref 26.6–33)
MCHC RBC AUTO-ENTMCNC: 33.2 G/DL (ref 31.5–35.7)
MCV RBC AUTO: 96.3 FL (ref 79–97)
MONOCYTES # BLD: 0.59 10*3/MM3 (ref 0.1–0.9)
NEUTROPHILS # BLD AUTO: 5.45 10*3/MM3 (ref 1.7–7)
NEUTROPHILS NFR BLD MANUAL: 65 % (ref 42.7–76)
PATHOLOGY REVIEW: YES
PLATELET # BLD AUTO: 278 10*3/MM3 (ref 140–450)
PMV BLD AUTO: 9.6 FL (ref 6–12)
POTASSIUM SERPL-SCNC: 4.2 MMOL/L (ref 3.5–5.2)
PROT SERPL-MCNC: 7.4 G/DL (ref 6–8.5)
RBC # BLD AUTO: 4.63 10*6/MM3 (ref 3.77–5.28)
RBC MORPH BLD: NORMAL
SMALL PLATELETS BLD QL SMEAR: ADEQUATE
SODIUM SERPL-SCNC: 140 MMOL/L (ref 136–145)
URATE SERPL-MCNC: 3.5 MG/DL (ref 2.4–5.7)
VARIANT LYMPHS NFR BLD MANUAL: 28 % (ref 19.6–45.3)
WBC MORPH BLD: NORMAL
WBC NRBC COR # BLD AUTO: 8.38 10*3/MM3 (ref 3.4–10.8)

## 2025-04-30 PROCEDURE — 83521 IG LIGHT CHAINS FREE EACH: CPT | Performed by: PHYSICIAN ASSISTANT

## 2025-04-30 PROCEDURE — 85652 RBC SED RATE AUTOMATED: CPT | Performed by: PHYSICIAN ASSISTANT

## 2025-04-30 PROCEDURE — 84165 PROTEIN E-PHORESIS SERUM: CPT | Performed by: PHYSICIAN ASSISTANT

## 2025-04-30 PROCEDURE — 86664 EPSTEIN-BARR NUCLEAR ANTIGEN: CPT | Performed by: PHYSICIAN ASSISTANT

## 2025-04-30 PROCEDURE — 86335 IMMUNFIX E-PHORSIS/URINE/CSF: CPT | Performed by: PHYSICIAN ASSISTANT

## 2025-04-30 PROCEDURE — 80053 COMPREHEN METABOLIC PANEL: CPT | Performed by: PHYSICIAN ASSISTANT

## 2025-04-30 PROCEDURE — G0463 HOSPITAL OUTPT CLINIC VISIT: HCPCS | Performed by: PHYSICIAN ASSISTANT

## 2025-04-30 PROCEDURE — 82784 ASSAY IGA/IGD/IGG/IGM EACH: CPT | Performed by: PHYSICIAN ASSISTANT

## 2025-04-30 PROCEDURE — 84166 PROTEIN E-PHORESIS/URINE/CSF: CPT | Performed by: PHYSICIAN ASSISTANT

## 2025-04-30 PROCEDURE — 85025 COMPLETE CBC W/AUTO DIFF WBC: CPT | Performed by: PHYSICIAN ASSISTANT

## 2025-04-30 PROCEDURE — 84550 ASSAY OF BLOOD/URIC ACID: CPT | Performed by: PHYSICIAN ASSISTANT

## 2025-04-30 PROCEDURE — 86140 C-REACTIVE PROTEIN: CPT | Performed by: PHYSICIAN ASSISTANT

## 2025-04-30 PROCEDURE — 85007 BL SMEAR W/DIFF WBC COUNT: CPT | Performed by: PHYSICIAN ASSISTANT

## 2025-04-30 PROCEDURE — 86665 EPSTEIN-BARR CAPSID VCA: CPT | Performed by: PHYSICIAN ASSISTANT

## 2025-04-30 PROCEDURE — 83615 LACTATE (LD) (LDH) ENZYME: CPT | Performed by: PHYSICIAN ASSISTANT

## 2025-04-30 PROCEDURE — 86663 EPSTEIN-BARR ANTIBODY: CPT | Performed by: PHYSICIAN ASSISTANT

## 2025-04-30 PROCEDURE — 36415 COLL VENOUS BLD VENIPUNCTURE: CPT | Performed by: PHYSICIAN ASSISTANT

## 2025-04-30 PROCEDURE — 86644 CMV ANTIBODY: CPT | Performed by: PHYSICIAN ASSISTANT

## 2025-04-30 PROCEDURE — 86334 IMMUNOFIX E-PHORESIS SERUM: CPT | Performed by: PHYSICIAN ASSISTANT

## 2025-04-30 PROCEDURE — 84156 ASSAY OF PROTEIN URINE: CPT | Performed by: PHYSICIAN ASSISTANT

## 2025-04-30 PROCEDURE — 86645 CMV ANTIBODY IGM: CPT | Performed by: PHYSICIAN ASSISTANT

## 2025-05-01 ENCOUNTER — OFFICE VISIT (OUTPATIENT)
Dept: FAMILY MEDICINE CLINIC | Facility: CLINIC | Age: 70
End: 2025-05-01
Payer: MEDICARE

## 2025-05-01 VITALS
TEMPERATURE: 96.4 F | DIASTOLIC BLOOD PRESSURE: 72 MMHG | HEIGHT: 57 IN | OXYGEN SATURATION: 97 % | BODY MASS INDEX: 27.61 KG/M2 | WEIGHT: 128 LBS | HEART RATE: 105 BPM | SYSTOLIC BLOOD PRESSURE: 120 MMHG

## 2025-05-01 DIAGNOSIS — L98.9 CHANGING SKIN LESION: ICD-10-CM

## 2025-05-01 DIAGNOSIS — L81.9 CHANGE IN MULTIPLE PIGMENTED SKIN LESIONS: Primary | ICD-10-CM

## 2025-05-01 LAB
CMV IGG SERPL IA-ACNC: >10 U/ML (ref 0–0.59)
CMV IGM SERPL IA-ACNC: <30 AU/ML (ref 0–29.9)
CYTO UR: NORMAL
EBV EA-D IGG SER-ACNC: 30.5 U/ML (ref 0–8.9)
EBV NA IGG SER IA-ACNC: >600 U/ML (ref 0–17.9)
EBV VCA IGG SER IA-ACNC: 294 U/ML (ref 0–17.9)
EBV VCA IGM SER IA-ACNC: <36 U/ML (ref 0–35.9)
LAB AP CASE REPORT: NORMAL
LAB AP CLINICAL INFORMATION: NORMAL
Lab: NORMAL
PATH REPORT.FINAL DX SPEC: NORMAL
PATH REPORT.GROSS SPEC: NORMAL

## 2025-05-01 PROCEDURE — 1126F AMNT PAIN NOTED NONE PRSNT: CPT | Performed by: NURSE PRACTITIONER

## 2025-05-01 PROCEDURE — 1159F MED LIST DOCD IN RCRD: CPT | Performed by: NURSE PRACTITIONER

## 2025-05-01 PROCEDURE — 99214 OFFICE O/P EST MOD 30 MIN: CPT | Performed by: NURSE PRACTITIONER

## 2025-05-01 PROCEDURE — 1160F RVW MEDS BY RX/DR IN RCRD: CPT | Performed by: NURSE PRACTITIONER

## 2025-05-01 NOTE — PROGRESS NOTES
Chief Complaint  Suspicious Skin Lesion (Right lower leg she has a mole that needs to be looked at. )    Subjective            Faith Yu presents to [facility]    Spot on my leg  Symptoms are: recurrent.   Onset was 1 to 5 years.   Symptoms include: abdominal pain, anorexia, joint pain, diaphoresis, fatigue, myalgias, neck pain, numbness and weakness.   Pertinent negative symptoms include no change in stool, no chest pain, no chills, no congestion, no cough, no fever, no headaches, no joint swelling, no nausea, no rash, no sore throat, no swollen glands, no dysuria, no vertigo, no visual change and no vomiting.   Treatment and/or Medications comments include: Tizanidine 2mg       History of Present Illness  The patient is a 69-year-old female who presents for evaluation of skin lesions.    She reports a recent consultation with her oncologist on 04/30/2025, where she was informed about the possibility of leukemia. A comprehensive blood workup was conducted, and a follow-up appointment is scheduled in 6 to 8 weeks. The oncologist mentioned leukocytosis and neutrophilia, indicating a potential diagnosis of leukemia, though it has not been confirmed as acute. The patient has a family history of leukemia, with a cousin previously diagnosed with the disease. Her next appointment with the oncologist is on 07/03/2025.    Additionally, she reports a skin lesion on her left leg, initially attributed to a dog scratch, which has been present for some time. The lesion is described as irregularly shaped and two-toned in color. Other skin lesions are noted on her buttock and hip, which have been present for an extended period. A previous consultation with a dermatologist years ago suggested these lesions were age-related. However, the current concern is that the lesions may have changed in appearance and size.    The patient also has a history of colon polyps and is scheduled for her  final colonoscopy. She expresses concerns about the potential development of colon cancer due to her recurrent polyps.    FAMILY HISTORY  She has a family history of leukemia, including a cousin who had the condition.      PHQ-2 Total Score: 0    PHQ-9 Total Score:        Past Medical History:   Diagnosis Date    Arthritis     Cataract     Colon polyp 1985    CTS (carpal tunnel syndrome) 20010    GERD (gastroesophageal reflux disease)     GERD (gastroesophageal reflux disease) 08/17/2021    Hyperlipidemia     Loss of appetite 08/17/2021    Nausea 08/17/2021    Parkinson's disease     Sciatica     Thoracic or lumbosacral neuritis or radiculitis     Urinary incontinence 2021    Have had this problem for about two years and has gotten real bad now    Urinary tract infection 2022       No Known Allergies     Past Surgical History:   Procedure Laterality Date    BACK SURGERY      LOW BACK DISC    BLADDER SURGERY  9/11 2001    CARPAL TUNNEL RELEASE      CATARACT EXTRACTION      COLONOSCOPY      ENDOSCOPY      ENDOSCOPY N/A 01/19/2022    Procedure: ESOPHAGOGASTRODUODENOSCOPY WITH BIOPSIES;  Surgeon: Luis Enrique Valenzuela MD;  Location: [facility];  Service: Gastroenterology;  Laterality: N/A;  HIATAL HERNIA    FOOT SURGERY      HYSTERECTOMY      DUE TO CANCER    SHOULDER SURGERY      TUBAL ABDOMINAL LIGATION      UPPER GASTROINTESTINAL ENDOSCOPY          Social History     Tobacco Use    Smoking status: Former     Current packs/day: 0.00     Average packs/day: 1 pack/day for 49.7 years (49.7 ttl pk-yrs)     Types: Cigarettes     Start date: 5/1/1972     Quit date: 1/26/2022     Years since quitting: 3.2     Passive exposure: Past    Smokeless tobacco: Never    Tobacco comments:     2 cigarettes per day   Vaping Use    Vaping status: Never Used   Substance Use Topics    Alcohol use: Never    Drug use: Never       Family History   Problem Relation Age of Onset    Depression Mother     Hyperlipidemia Mother      Hypertension Mother     Alcohol abuse Father     Depression Sister     Hyperlipidemia Sister     Hypertension Sister     Osteoporosis Sister     Depression Brother     Hypertension Brother     Alcohol abuse Brother     Alcohol abuse Maternal Grandfather     Dementia Maternal Grandfather     Alcohol abuse Paternal Grandfather     Colon cancer Paternal Uncle     Dementia Maternal Aunt     Malopal Hyperthermia Neg Hx         Health Maintenance Due   Topic Date Due    COLORECTAL CANCER SCREENING  03/02/2025        Current Outpatient Medications on File Prior to Visit   Medication Sig    atorvastatin (LIPITOR) 10 MG tablet Take 1 tablet by mouth Daily.    lisinopril (PRINIVIL,ZESTRIL) 10 MG tablet Take 1 tablet by mouth Daily.    tiZANidine (ZANAFLEX) 2 MG tablet Take 1 tablet by mouth 2 (Two) Times a Day As Needed for Muscle Spasms.     No current facility-administered medications on file prior to visit.       Immunization History   Administered Date(s) Administered    COVID-19 (PFIZER) Purple Cap Monovalent 04/24/2021, 05/15/2021, 11/23/2021, 07/25/2022    FLUAD TRI 65YR+ 01/07/2014    Flu Vaccine Split Quad 11/17/2015, 10/11/2016    Fluad Quad 65+ 10/30/2023    Fluzone (or Fluarix & Flulaval for VFC) >6mos 11/17/2015, 10/11/2016    Fluzone High-Dose 65+YRS 10/31/2024    Fluzone High-Dose 65+yrs 10/25/2021    Fluzone Quad >6mos (Multi-dose) 10/23/2018    Influenza Seasonal Injectable 01/07/2014    Influenza, Unspecified 09/09/2014, 11/17/2015, 10/15/2020    Pneumococcal Conjugate 20-Valent (PCV20) 07/25/2023    Pneumococcal Polysaccharide (PPSV23) 07/23/2019    Tdap 07/19/2017       Review of Systems   Constitutional:  Positive for diaphoresis and fatigue. Negative for chills and fever.   HENT:  Negative for congestion, sore throat and swollen glands.    Respiratory:  Negative for cough.    Cardiovascular:  Negative for chest pain.   Gastrointestinal:  Positive for abdominal pain and anorexia. Negative for nausea and  "vomiting.   Genitourinary:  Negative for dysuria.   Musculoskeletal:  Positive for joint pain, myalgias and neck pain.   Skin:  Positive for skin lesions. Negative for rash.   Neurological:  Positive for weakness and numbness. Negative for vertigo.        Objective     /72   Pulse 105   Temp 96.4 °F (35.8 °C) (Temporal)   Ht 143.5 cm (56.5\")   Wt 58.1 kg (128 lb)   SpO2 97%   BMI 28.19 kg/m²       Physical Exam  Vitals and nursing note reviewed.   Constitutional:       Appearance: Normal appearance.   HENT:      Head: Normocephalic.      Right Ear: External ear normal.      Left Ear: External ear normal.      Nose: Nose normal.      Mouth/Throat:      Mouth: Mucous membranes are moist.   Eyes:      Pupils: Pupils are equal, round, and reactive to light.   Cardiovascular:      Rate and Rhythm: Normal rate.   Pulmonary:      Effort: Pulmonary effort is normal.   Musculoskeletal:         General: Normal range of motion.      Cervical back: Normal range of motion.   Skin:     General: Skin is warm and dry.          Neurological:      Mental Status: She is alert and oriented to person, place, and time.   Psychiatric:         Mood and Affect: Mood normal.         Behavior: Behavior normal.         Thought Content: Thought content normal.         Judgment: Judgment normal.         Result Review :     The following data was reviewed by: VEL Hernandez on 05/01/2025:           Consult with Lizandro Lny PA-C (04/30/2025)   CMV IgG IgM (04/30/2025 11:24)  EBV Ab Panel (04/30/2025 11:24)  C-reactive Protein (04/30/2025 11:24)  Sedimentation Rate (04/30/2025 11:24)  Uric Acid (04/30/2025 11:24)  Peripheral Blood Smear (04/30/2025 11:24)  Comprehensive Metabolic Panel (04/30/2025 11:24)  Lactate Dehydrogenase (04/30/2025 11:24)  CBC Auto Differential (04/30/2025 11:24)  Results  Labs   - Blood work: Leukocytosis and neutrophilia               Assessment and Plan      Diagnoses and all orders for this " visit:    1. Change in multiple pigmented skin lesions (Primary)  -     Ambulatory Referral to Dermatology    2. Changing skin lesion  -     Ambulatory Referral to Dermatology      Referral to dermatology for further evaluation treatment possible future biopsy may be needed as there were 3 different lesions that look concerning    Assessment & Plan  1. Leukemia.  - The oncologist suspects leukemia based on symptoms and blood work showing leukocytosis and neutrophilia.  - Follow-up appointment with the oncologist is scheduled for 07/03/2025.  - The oncologist will call if any blood work results indicative of leukemia are received before the appointment.  - Further tests, such as bone marrow aspiration, may be considered to confirm the diagnosis if blood work suggests leukemia.    2. Skin lesions.  - Multiple skin lesions are present, including a two-toned colored, raised, dry, irregularly shaped lesion on the left leg and a large, dark, blackish colored, irregularly shaped lesion on the right leg.  - These lesions have changed over time and need further evaluation.  - Referral to dermatology has been made for further evaluation and potential biopsy of the lesions.  - Dermatology appointment requested in [place] for convenience.    3. Colon polyps.  - History of recurrent colon polyps with concern about the possibility of colon cancer.  - Scheduled for the last colonoscopy to monitor and detect potential malignancies early.  - Continued monitoring through colonoscopies is recommended to manage and detect any potential malignancies early.    4. Celiac disease.  - The patient's granddaughter is suspected to have celiac disease based on symptoms and blood work.  - Blood work was taken to check for celiac disease and other conditions.  - Follow-up with the specialist is recommended to confirm the diagnosis and manage dietary restrictions.          Follow Up     Return if symptoms worsen or fail to improve.    Patient was  given instructions and counseling regarding her condition or for health maintenance advice. Please see specific information pulled into the AVS if appropriate.            Faith MartinezGustavo  reports that she quit smoking about 3 years ago. Her smoking use included cigarettes. She started smoking about 53 years ago. She has a 49.7 pack-year smoking history. She has been exposed to tobacco smoke. She has never used smokeless tobacco. I have educated her on the risk of diseases from using tobacco products such as cancer, COPD, and heart disease.         Patient or patient representative verbalized consent for the use of Ambient Listening during the visit with  VEL Hernandez for chart documentation. 5/1/2025  10:30 EDT

## 2025-05-02 ENCOUNTER — HOSPITAL ENCOUNTER (OUTPATIENT)
Dept: MRI IMAGING | Facility: HOSPITAL | Age: 70
Discharge: HOME | End: 2025-05-02
Payer: MEDICARE

## 2025-05-02 DIAGNOSIS — M50.30 DDD (DEGENERATIVE DISC DISEASE), CERVICAL: ICD-10-CM

## 2025-05-02 DIAGNOSIS — M54.12 CERVICAL RADICULOPATHY: ICD-10-CM

## 2025-05-02 DIAGNOSIS — M54.2 NECK PAIN: ICD-10-CM

## 2025-05-02 LAB
ALBUMIN 24H MFR UR ELPH: 25.1 %
ALBUMIN SERPL ELPH-MCNC: 3.9 G/DL (ref 2.9–4.4)
ALBUMIN/GLOB SERPL: 1.5 {RATIO} (ref 0.7–1.7)
ALPHA1 GLOB 24H MFR UR ELPH: 9.3 %
ALPHA1 GLOB SERPL ELPH-MCNC: 0.3 G/DL (ref 0–0.4)
ALPHA2 GLOB 24H MFR UR ELPH: 4.1 %
ALPHA2 GLOB SERPL ELPH-MCNC: 0.9 G/DL (ref 0.4–1)
B-GLOBULIN MFR UR ELPH: 15.8 %
B-GLOBULIN SERPL ELPH-MCNC: 0.9 G/DL (ref 0.7–1.3)
GAMMA GLOB 24H MFR UR ELPH: 45.6 %
GAMMA GLOB SERPL ELPH-MCNC: 0.6 G/DL (ref 0.4–1.8)
GLOBULIN SER-MCNC: 2.7 G/DL (ref 2.2–3.9)
IGA SERPL-MCNC: 106 MG/DL (ref 87–352)
IGG SERPL-MCNC: 586 MG/DL (ref 586–1602)
IGM SERPL-MCNC: 135 MG/DL (ref 26–217)
INTERPRETATION SERPL IEP-IMP: NORMAL
INTERPRETATION UR IFE-IMP: NORMAL
KAPPA LC FREE SER-MCNC: 13.9 MG/L (ref 3.3–19.4)
KAPPA LC FREE UR-MCNC: 1.82 MG/L (ref 1.17–86.46)
KAPPA LC FREE/LAMBDA FREE SER: 1.35 {RATIO} (ref 0.26–1.65)
KAPPA LC FREE/LAMBDA FREE UR: >2.64 (ref 1.83–14.26)
LABORATORY COMMENT REPORT: NORMAL
LAMBDA LC FREE SERPL-MCNC: 10.3 MG/L (ref 5.7–26.3)
LAMBDA LC FREE UR-MCNC: <0.69 MG/L (ref 0.27–15.21)
Lab: NORMAL
M PROTEIN 24H MFR UR ELPH: NORMAL %
M PROTEIN SERPL ELPH-MCNC: NORMAL G/DL
PROT SERPL-MCNC: 6.6 G/DL (ref 6–8.5)
PROT UR-MCNC: 5.7 MG/DL

## 2025-05-02 PROCEDURE — 72141 MRI NECK SPINE W/O DYE: CPT

## 2025-05-05 DIAGNOSIS — M53.82 LIMITED ACTIVE RANGE OF MOTION (AROM) OF CERVICAL SPINE ON ROTATION: ICD-10-CM

## 2025-05-05 DIAGNOSIS — M54.12 CERVICAL RADICULOPATHY: ICD-10-CM

## 2025-05-05 DIAGNOSIS — M50.922 UNSPECIFIED CERVICAL DISC DISORDER AT C5-C6 LEVEL: Primary | ICD-10-CM

## 2025-05-05 DIAGNOSIS — M50.30 DDD (DEGENERATIVE DISC DISEASE), CERVICAL: ICD-10-CM

## 2025-05-05 DIAGNOSIS — M79.18 RHOMBOID MUSCLE PAIN: ICD-10-CM

## 2025-05-05 RX ORDER — TIZANIDINE 2 MG/1
2 TABLET ORAL 2 TIMES DAILY PRN
Qty: 90 TABLET | Refills: 0 | Status: SHIPPED | OUTPATIENT
Start: 2025-05-05

## 2025-05-13 ENCOUNTER — OFFICE VISIT (OUTPATIENT)
Dept: NEUROLOGY | Facility: CLINIC | Age: 70
End: 2025-05-13
Payer: MEDICARE

## 2025-05-13 ENCOUNTER — PATIENT ROUNDING (BHMG ONLY) (OUTPATIENT)
Dept: NEUROLOGY | Facility: CLINIC | Age: 70
End: 2025-05-13
Payer: MEDICARE

## 2025-05-13 VITALS
WEIGHT: 128.3 LBS | SYSTOLIC BLOOD PRESSURE: 150 MMHG | BODY MASS INDEX: 27.68 KG/M2 | HEIGHT: 57 IN | DIASTOLIC BLOOD PRESSURE: 84 MMHG | HEART RATE: 93 BPM

## 2025-05-13 DIAGNOSIS — G20.A2 PARKINSON'S DISEASE WITHOUT DYSKINESIA, WITH FLUCTUATING MANIFESTATIONS: Primary | ICD-10-CM

## 2025-05-13 RX ORDER — CARBIDOPA AND LEVODOPA 25; 100 MG/1; MG/1
1 TABLET ORAL 3 TIMES DAILY
Qty: 90 TABLET | Refills: 5 | Status: SHIPPED | OUTPATIENT
Start: 2025-05-13

## 2025-05-15 NOTE — PROGRESS NOTES
Chief Complaint  Neurologic Problem    Subjective          Faith Yu presents to [facility]  History of Present Illness    History of Present Illness  The patient presents to the office for a new referral, with a history of Parkinson's disease. She was last seen in 2022 and did not feel the need to take carbidopa-levodopa for her Parkinson's at that time. Her primary care provider recommended another evaluation to ensure she does not require treatment.    She reports experiencing intermittent exacerbations of her Parkinson's symptoms, including increased leg stiffness and difficulty in movement, describing a sensation of heaviness in her legs. Despite these challenges, she maintains some mobility and can perform tasks such as cooking and dishwashing. She also notes a decline in her  strength, necessitating caution to prevent dropping objects. She experiences tremors, particularly in her head, which vary in intensity. Her walking distance is limited, and she uses a scooter for long distances. She describes a sensation of her feet adhering to the floor during ambulation, a symptom that initially presented on the right side but has since progressed to the left. She also reports muscle spasms in her legs. She expresses concern about the potential impact of Parkinson's on her back pain.    She had an ER visit for acute cervical radiculopathy in the fall and has an upcoming appointment with neurosurgery. She describes severe neck pain that radiated up her neck, likening it to labor pain. During this episode, her blood pressure spiked to over 200 systolic. She was treated with IV Demerol, which helped lower her blood pressure as the pain subsided. An MRI revealed disc issues, prompting a referral to neurosurgery.    She also mentions a recent illness that left her feeling drained, with blood work revealing an elevated white count. This led to a referral to a  "hematologist-oncologist.    SOCIAL HISTORY  Marital Status:   Occupations: Worked in a hospital  Hobbies: Cooking, working in the flower garden    MEDICATIONS  PREVIOUS MEDS:  Carbidopa Levodopa  Reason for Discontinuation: Patient did not feel the need to take it for Parkinson's disease.       Objective   Vital Signs:   /84   Pulse 93   Ht 143.5 cm (56.5\")   Wt 58.2 kg (128 lb 4.8 oz)   BMI 28.26 kg/m²     Physical Exam  Eyes:      General: Lids are normal.      Extraocular Movements: Extraocular movements intact.      Pupils: Pupils are equal, round, and reactive to light.   Neurological:      Motor: Motor strength is normal.       Neurological Exam  Mental Status  Awake, alert and oriented to person, place and time.    Cranial Nerves  CN II: Visual acuity is normal. Visual fields full to confrontation.  CN III, IV, VI: Extraocular movements intact bilaterally. Normal lids and orbits bilaterally. Pupils equal round and reactive to light bilaterally.  CN V: Facial sensation is normal.  CN VII: Full and symmetric facial movement.  CN IX, X: Palate elevates symmetrically. Normal gag reflex.  CN XI: Shoulder shrug strength is normal.  CN XII: Tongue midline without atrophy or fasciculations.    Motor   Strength is 5/5 throughout all four extremities.    Coordination    Severe bradykinesia in all four extremities.  Mild rigidity. Shuffling gait.  Narrow stride length.      Result Review :               Assessment and Plan    Diagnoses and all orders for this visit:    1. Parkinson's disease without dyskinesia, with fluctuating manifestations (Primary)    Other orders  -     carbidopa-levodopa (SINEMET)  MG per tablet; Take 1 tablet by mouth 3 (Three) Times a Day.  Dispense: 90 tablet; Refill: 5        Assessment & Plan  1. Parkinson's Disease.  She presents with severe bradykinesia in all extremities, indicating significant progression of her Parkinson's disease. The goal is to initiate treatment " while she is still active to prevent further muscle deconditioning. She will be restarted on carbidopa-levodopa, beginning with 1 tablet 3 times daily. The first dose should be taken upon waking, and the last dose with supper, avoiding administration right before bed. The dosage will be reassessed and potentially titrated in a couple of months based on her response. She is advised to contact the office if she experiences any issues with the medication.    2. Acute Cervical Radiculopathy.  She had an ER visit for acute cervical radiculopathy in the fall and has an upcoming appointment with neurosurgery. An MRI brain in December showed findings compatible with chronic microvascular ischemic changes and a probable remote lacunar infarct in the right cerebellum.    Follow-up  The patient will follow up in 2 months.       I spent 40 minutes caring for Faith on this date of service. This time includes time spent by me in the following activities:preparing for the visit, reviewing tests, obtaining and/or reviewing a separately obtained history, performing a medically appropriate examination and/or evaluation , counseling and educating the patient/family/caregiver, ordering medications, tests, or procedures, documenting information in the medical record, and independently interpreting results and communicating that information with the patient/family/caregiver  Follow Up   Return in about 2 months (around 7/13/2025) for parkinson's f/u.  Patient was given instructions and counseling regarding her condition or for health maintenance advice. Please see specific information pulled into the AVS if appropriate.       Patient or patient representative verbalized consent for the use of Ambient Listening during the visit with  VEL Menendez for chart documentation. 5/15/2025  14:53 EDT

## 2025-05-27 ENCOUNTER — OFFICE VISIT (OUTPATIENT)
Dept: NEUROSURGERY | Facility: CLINIC | Age: 70
End: 2025-05-27
Payer: MEDICARE

## 2025-05-27 VITALS
HEART RATE: 63 BPM | OXYGEN SATURATION: 96 % | BODY MASS INDEX: 27.61 KG/M2 | HEIGHT: 57 IN | WEIGHT: 128 LBS | DIASTOLIC BLOOD PRESSURE: 86 MMHG | SYSTOLIC BLOOD PRESSURE: 132 MMHG

## 2025-05-27 DIAGNOSIS — M47.812 CERVICAL SPONDYLOSIS WITHOUT MYELOPATHY: Primary | ICD-10-CM

## 2025-05-27 DIAGNOSIS — I10 HYPERTENSION, UNSPECIFIED TYPE: ICD-10-CM

## 2025-05-27 DIAGNOSIS — M54.81 OCCIPITAL NEURALGIA OF RIGHT SIDE: ICD-10-CM

## 2025-05-27 PROCEDURE — 99203 OFFICE O/P NEW LOW 30 MIN: CPT | Performed by: PHYSICIAN ASSISTANT

## 2025-05-27 PROCEDURE — 1159F MED LIST DOCD IN RCRD: CPT | Performed by: PHYSICIAN ASSISTANT

## 2025-05-27 PROCEDURE — 1160F RVW MEDS BY RX/DR IN RCRD: CPT | Performed by: PHYSICIAN ASSISTANT

## 2025-05-27 RX ORDER — LISINOPRIL 10 MG/1
10 TABLET ORAL DAILY
Qty: 90 TABLET | Refills: 1 | OUTPATIENT
Start: 2025-05-27

## 2025-05-27 NOTE — PROGRESS NOTES
"Chief Complaint  Neck Pain (Neck pain that sharp pains move up to the head, at times )    Subjective          Faith Yu who is a 69 y.o. year old female who presents to [facility] for Evaluation of the Spine.     The patient complains of pain located in the Cervical Spine.  Patients states the pain has been present \"on and off for years\".  The pain came on gradually.  The pain scaled level is 3.  The pain  does not radiate into the upper extremities .  The pain is constant and waxing/waning and described as sharp.  The pain is worse in the evening. Patient states Heat and Muscle Relaxants makes the pain better.  Patient states nothing worsens the pain.    Associated Symptoms Include: Denies Numbness, Tingling, and Weakness in the arms - reports \"the tingling in my shoulder\".  Conservative Interventions Include: Muscle Relaxants that were somewhat effective. Physical therapy was ineffective (made her pain worse).    Was this the result of an injury or accident? : No    History of Previous Spinal Surgery?: Yes.  Lumbar, Date 10 years ago.     reports that she quit smoking about 3 years ago. Her smoking use included cigarettes. She started smoking about 53 years ago. She has a 49.7 pack-year smoking history. She has been exposed to tobacco smoke. She has never used smokeless tobacco.    Review of Systems   Musculoskeletal:  Positive for neck pain.        Objective   Vital Signs:   /86   Pulse 63   Ht 143.5 cm (56.5\")   Wt 58.1 kg (128 lb)   SpO2 96%   BMI 28.19 kg/m²       Physical Exam  Constitutional:       Appearance: Normal appearance.   Neck:      Comments: Pain with ROM  Pulmonary:      Effort: Pulmonary effort is normal.   Musculoskeletal:         General: Tenderness (right occipital area) present.      Comments: Gong's negative bilaterally   Neurological:      General: No focal deficit present.      Mental Status: She is alert and oriented to " person, place, and time.      Sensory: No sensory deficit.      Motor: No weakness.      Deep Tendon Reflexes: Reflexes normal.   Psychiatric:         Mood and Affect: Mood normal.         Behavior: Behavior normal.        Neurological Exam  Mental Status  Alert. Oriented to person, place, and time.      Result Review     I have independently interpreted the MRI of cervical spine without contrast from 5/02/25 which shows mild multilevel spondylosis including facet arthritis.  There is mild disc bulging at C4-C5, C5-C6.  There is mild foraminal stenosis bilaterally at both levels and mild central canal narrowing at C5-C6.     Assessment and Plan    Diagnoses and all orders for this visit:    1. Cervical spondylosis without myelopathy (Primary)    2. Occipital neuralgia of right side    She has pain primarily in the neck.    She has multilevel spondylosis including facet arthritis. She could consider a trial of MBBs/RFA in the cervical spine for neck pain.    I do no, however, expect surgery in the cervical spine to be indicated or helpful in her case.    She may consider physical therapy targeting the cervical spine conservatively. This was not helpful previously and worsened her pain.    She has tenderness in the right occipital area and may have a component of right occipital neuralgia that may explain some of her complaints. She could consider trial of occipital nerve blocks.    She is not sure she wants to visit pain management at this point. She can let us know if she does decide to do this and I can refer her then.    The patient was counseled on basic recommendations for the reduction and prevention of back, neck, or spine pain in association with spinal disorders, including: cessation/avoidance of nicotine use, maintenance of a healthy BMI and weight, focusing on building/maintaining core strength through core exercise, and avoidance of activities which worsen the pain. The patient will monitor for changes in  symptoms and notify our clinic of these changes as needed.    Follow Up   Return if symptoms worsen or fail to improve.  Patient was given instructions and counseling regarding her condition or for health maintenance advice. Please see specific information pulled into the AVS if appropriate.

## 2025-06-06 DIAGNOSIS — E78.5 DYSLIPIDEMIA: ICD-10-CM

## 2025-06-07 RX ORDER — ATORVASTATIN CALCIUM 10 MG/1
10 TABLET, FILM COATED ORAL DAILY
Qty: 90 TABLET | Refills: 0 | Status: SHIPPED | OUTPATIENT
Start: 2025-06-07

## 2025-06-10 DIAGNOSIS — E78.5 DYSLIPIDEMIA: ICD-10-CM

## 2025-06-10 RX ORDER — ATORVASTATIN CALCIUM 10 MG/1
10 TABLET, FILM COATED ORAL DAILY
Qty: 90 TABLET | Refills: 0 | OUTPATIENT
Start: 2025-06-10

## 2025-06-30 ENCOUNTER — PREP FOR SURGERY (OUTPATIENT)
Dept: OTHER | Facility: HOSPITAL | Age: 70
End: 2025-06-30
Payer: MEDICARE

## 2025-06-30 ENCOUNTER — CLINICAL SUPPORT (OUTPATIENT)
Dept: GASTROENTEROLOGY | Facility: CLINIC | Age: 70
End: 2025-06-30
Payer: MEDICARE

## 2025-06-30 DIAGNOSIS — Z80.0 FAMILY HX OF COLON CANCER: ICD-10-CM

## 2025-06-30 DIAGNOSIS — Z86.0100 HX OF COLONIC POLYPS: ICD-10-CM

## 2025-06-30 DIAGNOSIS — Z12.11 ENCOUNTER FOR SCREENING COLONOSCOPY: Primary | ICD-10-CM

## 2025-06-30 DIAGNOSIS — D12.6 TUBULAR ADENOMA OF COLON: ICD-10-CM

## 2025-06-30 RX ORDER — PEG-3350, SODIUM SULFATE, SODIUM CHLORIDE, POTASSIUM CHLORIDE, SODIUM ASCORBATE AND ASCORBIC ACID 7.5-2.691G
1000 KIT ORAL TAKE AS DIRECTED
Qty: 1000 ML | Refills: 0 | Status: SHIPPED | OUTPATIENT
Start: 2025-06-30

## 2025-06-30 NOTE — PROGRESS NOTES
Faith Yu  1955  69 y.o.    Reason for call: 5yr colon recall, last scope 2020 Dr Valenzuela, hx colon polyps-TA, Fm Hx Colon cancer-Paternal Uncle  If recall, please list diagnosis:   Prep prescribed: Moviprep  Prep instructions reviewed with patient and sent to patient via [employer]  Is the patient currently on any injectable or oral medications for weight loss or diabetes? No  Clearance needed? No  If yes, what clearance is needed?   Clearance has been requested from   The patient has been scheduled for: Colonoscopy     Faith Yu is aware they have been scheduled for a screening colonoscopy. Patient has expressed they are not having any symptoms at all.     Family history of colon cancer? Yes  If yes, indicate relative: Paternal Uncle  Tentative Procedure Date: 7.15.2025    Date/Place of last Scope: 7.15.2025  Able to obtain report? yes  Family History   Problem Relation Age of Onset    Depression Mother     Hyperlipidemia Mother     Hypertension Mother     Alcohol abuse Father     Depression Sister     Hyperlipidemia Sister     Hypertension Sister     Osteoporosis Sister     Depression Brother     Hypertension Brother     Alcohol abuse Brother     Dementia Maternal Aunt     Alcohol abuse Maternal Aunt     Colon cancer Paternal Uncle     Alcohol abuse Maternal Grandfather     Dementia Maternal Grandfather     Alcohol abuse Paternal Grandfather     Malig Hyperthermia Neg Hx     Esophageal cancer Neg Hx     Liver cancer Neg Hx     Rectal cancer Neg Hx     Stomach cancer Neg Hx      Past Medical History:   Diagnosis Date    Arthritis     Cataract     Cervical disc disorder 2025    Just found out about Disk    Colon polyp 1985    CTS (carpal tunnel syndrome) 20010    GERD (gastroesophageal reflux disease)     GERD (gastroesophageal reflux disease) 08/17/2021    Hyperlipidemia     Loss of appetite 08/17/2021    Nausea 08/17/2021    Parkinson's disease     Sciatica     Thoracic or lumbosacral  neuritis or radiculitis     Urinary incontinence 2021    Have had this problem for about two years and has gotten real bad now    Urinary tract infection 2022     No Known Allergies  Past Surgical History:   Procedure Laterality Date    BACK SURGERY      LOW BACK DISC    BLADDER SURGERY  9/11 2001    CARPAL TUNNEL RELEASE      CATARACT EXTRACTION      COLONOSCOPY      ENDOSCOPY      ENDOSCOPY N/A 01/19/2022    Procedure: ESOPHAGOGASTRODUODENOSCOPY WITH BIOPSIES;  Surgeon: Luis Enrique Valenzuela MD;  Location: [facility] END[facility];  Service: Gastroenterology;  Laterality: N/A;  HIATAL HERNIA    FOOT SURGERY      HYSTERECTOMY      DUE TO CANCER    SHOULDER SURGERY      TUBAL ABDOMINAL LIGATION      UPPER GASTROINTESTINAL ENDOSCOPY       Social History     Socioeconomic History    Marital status:    Tobacco Use    Smoking status: Former     Current packs/day: 0.00     Average packs/day: 1 pack/day for 49.7 years (49.7 ttl pk-yrs)     Types: Cigarettes     Start date: 5/1/1972     Quit date: 1/26/2022     Years since quitting: 3.4     Passive exposure: Past    Smokeless tobacco: Never    Tobacco comments:     2 cigarettes per day   Vaping Use    Vaping status: Never Used   Substance and Sexual Activity    Alcohol use: Never    Drug use: Never    Sexual activity: Yes     Partners: Male     Birth control/protection: Tubal ligation, Hysterectomy       Current Outpatient Medications:     atorvastatin (LIPITOR) 10 MG tablet, TAKE 1 TABLET BY MOUTH DAILY, Disp: 90 tablet, Rfl: 0    carbidopa-levodopa (SINEMET)  MG per tablet, Take 1 tablet by mouth 3 (Three) Times a Day., Disp: 90 tablet, Rfl: 5    lisinopril (PRINIVIL,ZESTRIL) 10 MG tablet, Take 1 tablet by mouth Daily., Disp: 90 tablet, Rfl: 0    tiZANidine (ZANAFLEX) 2 MG tablet, TAKE 1 TABLET BY MOUTH TWICE DAILY AS NEEDED FOR MUSCLE SPASMS, Disp: 90 tablet, Rfl: 0

## 2025-07-01 ENCOUNTER — TELEPHONE (OUTPATIENT)
Dept: GASTROENTEROLOGY | Facility: CLINIC | Age: 70
End: 2025-07-01
Payer: MEDICARE

## 2025-07-01 NOTE — TELEPHONE ENCOUNTER
ENDO RECONCILIATION  Verify source of procedure(s): Nurse/MA direct access 6/2025  If other, please list source: 5yr colon recall-2020 Dr Valenzuela  TIME OUT-CONFIRM CORRECT PROCEDURE: Colonoscopy  Cardiology: none  Pulmonology: none  Blood thinner: none  GLP-1: none  Additional DX/indication for procedure: 5yr colon recall-2020 Dr Valenzuela, Hx colon polyps-TA, Hx Colon cancer-Paternal uncle  Please include any other notes relevant to endo reconciliation:  N/A

## 2025-07-02 ENCOUNTER — OFFICE VISIT (OUTPATIENT)
Dept: ONCOLOGY | Facility: HOSPITAL | Age: 70
End: 2025-07-02
Payer: MEDICARE

## 2025-07-02 VITALS
BODY MASS INDEX: 27.33 KG/M2 | HEART RATE: 90 BPM | OXYGEN SATURATION: 95 % | TEMPERATURE: 98.1 F | WEIGHT: 121.5 LBS | SYSTOLIC BLOOD PRESSURE: 150 MMHG | HEIGHT: 56 IN | RESPIRATION RATE: 18 BRPM | DIASTOLIC BLOOD PRESSURE: 86 MMHG

## 2025-07-02 DIAGNOSIS — D72.829 LEUKOCYTOSIS, UNSPECIFIED TYPE: Primary | ICD-10-CM

## 2025-07-02 PROCEDURE — G0463 HOSPITAL OUTPT CLINIC VISIT: HCPCS | Performed by: INTERNAL MEDICINE

## 2025-07-02 NOTE — PROGRESS NOTES
Chief Complaint  Leukocytosis, unspecified type    Rosalba Smith, A*  Rosalba Smith, APRN    Subjective          Faith Yu presents to [facility] for follow-up of leukocytosis.  This was identified on routine lab work.  Patient denies recurrent infections, fever, chills, weight loss, night sweats or adenopathy.  She notes fatigue but she relates this to her Parkinson's disease.  She denies excessive bruising or bleeding.      Current Outpatient Medications on File Prior to Visit   Medication Sig Dispense Refill    atorvastatin (LIPITOR) 10 MG tablet TAKE 1 TABLET BY MOUTH DAILY 90 tablet 0    carbidopa-levodopa (SINEMET)  MG per tablet Take 1 tablet by mouth 3 (Three) Times a Day. 90 tablet 5    lisinopril (PRINIVIL,ZESTRIL) 10 MG tablet Take 1 tablet by mouth Daily. 90 tablet 0    PEG-KCl-NaCl-NaSulf-Na Asc-C (MoviPrep) 100 g reconstituted solution powder Take 1,000 mL by mouth Take As Directed. 1000 mL 0    tiZANidine (ZANAFLEX) 2 MG tablet TAKE 1 TABLET BY MOUTH TWICE DAILY AS NEEDED FOR MUSCLE SPASMS 90 tablet 0     No current facility-administered medications on file prior to visit.       No Known Allergies  Past Medical History:   Diagnosis Date    Arthritis     Cataract     Cervical disc disorder 2025    Just found out about Disk    Colon polyp 1985    CTS (carpal tunnel syndrome) 20010    GERD (gastroesophageal reflux disease)     GERD (gastroesophageal reflux disease) 08/17/2021    Hyperlipidemia     Loss of appetite 08/17/2021    Nausea 08/17/2021    Parkinson's disease     Sciatica     Thoracic or lumbosacral neuritis or radiculitis     Urinary incontinence 2021    Have had this problem for about two years and has gotten real bad now    Urinary tract infection 2022     Past Surgical History:   Procedure Laterality Date    BACK SURGERY      LOW BACK DISC    BLADDER SURGERY  9/11 2001    CARPAL TUNNEL RELEASE      CATARACT EXTRACTION       COLONOSCOPY      ENDOSCOPY      ENDOSCOPY N/A 01/19/2022    Procedure: ESOPHAGOGASTRODUODENOSCOPY WITH BIOPSIES;  Surgeon: Luis Enrique Valenzuela MD;  Location: [facility] ENDOSCOPY;  Service: Gastroenterology;  Laterality: N/A;  HIATAL HERNIA    FOOT SURGERY      HYSTERECTOMY      DUE TO CANCER    SHOULDER SURGERY      TUBAL ABDOMINAL LIGATION      UPPER GASTROINTESTINAL ENDOSCOPY       Social History     Socioeconomic History    Marital status:    Tobacco Use    Smoking status: Former     Current packs/day: 0.00     Average packs/day: 1 pack/day for 49.7 years (49.7 ttl pk-yrs)     Types: Cigarettes     Start date: 5/1/1972     Quit date: 1/26/2022     Years since quitting: 3.4     Passive exposure: Past    Smokeless tobacco: Never    Tobacco comments:     2 cigarettes per day   Vaping Use    Vaping status: Never Used   Substance and Sexual Activity    Alcohol use: Never    Drug use: Never    Sexual activity: Yes     Partners: Male     Birth control/protection: Tubal ligation, Hysterectomy     Family History   Problem Relation Age of Onset    Depression Mother     Hyperlipidemia Mother     Hypertension Mother     Alcohol abuse Father     Depression Sister     Hyperlipidemia Sister     Hypertension Sister     Osteoporosis Sister     Depression Brother     Hypertension Brother     Alcohol abuse Brother     Dementia Maternal Aunt     Alcohol abuse Maternal Aunt     Colon cancer Paternal Uncle     Alcohol abuse Maternal Grandfather     Dementia Maternal Grandfather     Alcohol abuse Paternal Grandfather     Malig Hyperthermia Neg Hx     Esophageal cancer Neg Hx     Liver cancer Neg Hx     Rectal cancer Neg Hx     Stomach cancer Neg Hx        Objective   Physical Exam  Vitals reviewed. Exam conducted with a chaperone present.   Cardiovascular:      Rate and Rhythm: Normal rate and regular rhythm.      Heart sounds: Normal heart sounds. No murmur heard.     No gallop.   Pulmonary:      Effort: Pulmonary effort is  "normal.      Breath sounds: Normal breath sounds.   Abdominal:      General: Bowel sounds are normal.         Vitals:    07/02/25 1448   BP: 150/86   Pulse: 90   Resp: 18   Temp: 98.1 °F (36.7 °C)   TempSrc: Temporal   SpO2: 95%   Weight: 55.1 kg (121 lb 8 oz)   Height: 143.5 cm (56.5\")   PainSc: 0-No pain     ECOG score: 0         PHQ-9 Total Score:                      Result Review :   The following data was reviewed by: Jose Rafael Garcia MD on 07/02/2025:  Lab Results   Component Value Date    HGB 14.8 04/30/2025    HCT 44.6 04/30/2025    MCV 96.3 04/30/2025     04/30/2025    WBC 8.38 04/30/2025    NEUTROABS 5.45 04/30/2025    LYMPHSABS 3.14 (H) 03/18/2025    MONOSABS 0.89 03/18/2025    EOSABS 0.13 03/18/2025    BASOSABS 0.05 03/18/2025     Lab Results   Component Value Date    GLUCOSE 115 (H) 04/30/2025    BUN 11 04/30/2025    CREATININE 0.81 04/30/2025     04/30/2025    K 4.2 04/30/2025     04/30/2025    CO2 23.7 04/30/2025    CALCIUM 10.5 04/30/2025    PROTEINTOT 7.4 04/30/2025    PROTEINTOT 6.6 04/30/2025    ALBUMIN 4.8 04/30/2025    ALBUMIN 3.9 04/30/2025    BILITOT 0.3 04/30/2025    ALKPHOS 116 04/30/2025    AST 17 04/30/2025    ALT 9 04/30/2025     Lab Results   Component Value Date    MG 2.3 04/09/2025    PHOS 3.0 04/09/2025    FREET4 1.1 05/26/2021    TSH 1.930 03/18/2025     Lab Results   Component Value Date    IRON 84 01/25/2022    LABIRON 25 01/25/2022    TRANSFERRIN 228 01/25/2022    TIBC 340 01/25/2022     Lab Results   Component Value Date     04/30/2025    FERRITIN 68.80 01/25/2022    QJHUTFXB33 403 03/18/2025    FOLATE 9.97 03/18/2025     No results found for: \"PSA\", \"CEA\", \"AFP\", \"\", \"\"            Assessment and Plan    Diagnoses and all orders for this visit:    1. Leukocytosis, unspecified type (Primary)  Assessment & Plan:  Noted on lab work earlier in the year however the white blood cell count and differential have normalized.  Peripheral smear shows " normal morphology.  Flow cytometry is negative.  This would appear to be reactive process that has corrected itself.  No further recommendations at this time.  I am happy to see her back in the future should any additional questions or concerns arise.              Patient Follow Up: As needed    Patient was given instructions and counseling regarding her condition or for health maintenance advice. Please see specific information pulled into the AVS if appropriate.     Jose Rafael Garcia MD    7/2/2025

## 2025-07-02 NOTE — PAT
Reviewed the following with patient.    Arrival time of 0930.    Must have  over 18 for transportation home post procedure. Come to [facility], entrance [address].     Education provided on laxative administration; bowel prep to be taken in two doses. Reviewed diet instructions for day prior to procedure. Only plain, unflavored water after midnight until two hours prior to arrival time.     Do not take any morning medications on the day of the procedure. Instead bring all prescribed medication and inhalers to the hospital the morning of the procedure. May take any nebulizer treatments or inhalers the morning of the procedure.     Plan to be here 3-4 hours.   Do not bring any valuables to hospital with you. Call Endo department for any questions.     Pt verbalized understanding of instructions.

## 2025-07-02 NOTE — ASSESSMENT & PLAN NOTE
Noted on lab work earlier in the year however the white blood cell count and differential have normalized.  Peripheral smear shows normal morphology.  Flow cytometry is negative.  This would appear to be reactive process that has corrected itself.  No further recommendations at this time.  I am happy to see her back in the future should any additional questions or concerns arise.

## 2025-07-14 ENCOUNTER — ANESTHESIA EVENT (OUTPATIENT)
Dept: GASTROENTEROLOGY | Facility: HOSPITAL | Age: 70
End: 2025-07-14
Payer: MEDICARE

## 2025-07-14 ENCOUNTER — CLINICAL SUPPORT (OUTPATIENT)
Dept: FAMILY MEDICINE CLINIC | Facility: CLINIC | Age: 70
End: 2025-07-14
Payer: MEDICARE

## 2025-07-14 DIAGNOSIS — E78.5 DYSLIPIDEMIA: ICD-10-CM

## 2025-07-14 LAB
ALBUMIN SERPL-MCNC: 4.4 G/DL (ref 3.5–5.2)
ALP SERPL-CCNC: 112 U/L (ref 39–117)
ALT SERPL W P-5'-P-CCNC: 9 U/L (ref 1–33)
AST SERPL-CCNC: 14 U/L (ref 1–32)
BILIRUB CONJ SERPL-MCNC: <0.1 MG/DL (ref 0–0.3)
BILIRUB INDIRECT SERPL-MCNC: NORMAL MG/DL
BILIRUB SERPL-MCNC: 0.3 MG/DL (ref 0–1.2)
CHOLEST SERPL-MCNC: 160 MG/DL (ref 0–200)
HDLC SERPL-MCNC: 49 MG/DL (ref 40–60)
LDLC SERPL CALC-MCNC: 85 MG/DL (ref 0–100)
LDLC/HDLC SERPL: 1.67 {RATIO}
PROT SERPL-MCNC: 7.3 G/DL (ref 6–8.5)
TRIGL SERPL-MCNC: 147 MG/DL (ref 0–150)
VLDLC SERPL-MCNC: 26 MG/DL (ref 5–40)

## 2025-07-14 PROCEDURE — 36415 COLL VENOUS BLD VENIPUNCTURE: CPT | Performed by: NURSE PRACTITIONER

## 2025-07-14 PROCEDURE — 80061 LIPID PANEL: CPT | Performed by: NURSE PRACTITIONER

## 2025-07-14 PROCEDURE — 80076 HEPATIC FUNCTION PANEL: CPT | Performed by: NURSE PRACTITIONER

## 2025-07-14 NOTE — ANESTHESIA PREPROCEDURE EVALUATION
Anesthesia Evaluation     Patient summary reviewed and Nursing notes reviewed   NPO Solid Status: > 8 hours  NPO Liquid Status: > 4 hours           Airway   Mallampati: II  TM distance: >3 FB  Neck ROM: full  No difficulty expected  Dental    (+) poor dentition        Pulmonary     breath sounds clear to auscultation  (+) a smoker (current- social) Current, Abstained day of surgery, cigarettes,  Cardiovascular - normal exam  Exercise tolerance: good (4-7 METS)    ECG reviewed  Rhythm: regular  Rate: normal    (+) hyperlipidemia    ROS comment: ABNORMAL ECG -  Sinus rhythm  Ventricular premature complex  RSR' in V1 or V2, right VCD or RVH  Inferior  infarct, old  When compared with ECG of 29-Sep-2016 10:46:32,  No significant change  Electronically Signed By: Armand Hollins ([facility]) 2024-10-24 10:16:41  Date and Time of Study:2024-10-24 05:11:53      Neuro/Psych  (+) Parkinson's disease (carbidopa/levidopa last taken on 07/13), numbness    ROS Comment: Parkinson's   GI/Hepatic/Renal/Endo    (+) GERD well controlled    Musculoskeletal     (+) back pain, radiculopathy  Abdominal    Substance History - negative use     OB/GYN negative ob/gyn ROS         Other   arthritis,     ROS/Med Hx Other: Screening, hx polyps, fam hx     EKG 10/24/24: HR 72,   Sinus rhythm  Ventricular premature complex  RSR' in V1 or V2, right VCD or RVH  Inferior  infarct, old                      Anesthesia Plan    ASA 3     general   total IV anesthesia  (Risks explained including allergic reactions, BP, HR, O2 changes, aspiration, apnea, advanced airway placement. Pt verbalized understanding. Patient understands anesthesia not responsible for dental damage.)  intravenous induction     Anesthetic plan, risks, benefits, and alternatives have been provided, discussed and informed consent has been obtained with: patient.  Pre-procedure education provided  Plan discussed with CRNA.      CODE STATUS:

## 2025-07-14 NOTE — PROGRESS NOTES
..  Venipuncture Blood Specimen Collection  Venipuncture performed in LT arm by Karmen Dooley with good hemostasis. Patient tolerated the procedure well without complications.   07/14/25   Hanna Melchor MA

## 2025-07-15 ENCOUNTER — ANESTHESIA (OUTPATIENT)
Dept: GASTROENTEROLOGY | Facility: HOSPITAL | Age: 70
End: 2025-07-15
Payer: MEDICARE

## 2025-07-15 ENCOUNTER — HOSPITAL ENCOUNTER (OUTPATIENT)
Facility: HOSPITAL | Age: 70
Setting detail: HOSPITAL OUTPATIENT SURGERY
Discharge: HOME | End: 2025-07-15
Attending: INTERNAL MEDICINE | Admitting: INTERNAL MEDICINE
Payer: MEDICARE

## 2025-07-15 ENCOUNTER — RESULTS FOLLOW-UP (OUTPATIENT)
Dept: FAMILY MEDICINE CLINIC | Facility: CLINIC | Age: 70
End: 2025-07-15
Payer: MEDICARE

## 2025-07-15 VITALS
SYSTOLIC BLOOD PRESSURE: 139 MMHG | DIASTOLIC BLOOD PRESSURE: 69 MMHG | BODY MASS INDEX: 26.51 KG/M2 | RESPIRATION RATE: 18 BRPM | HEART RATE: 61 BPM | OXYGEN SATURATION: 99 % | WEIGHT: 120.37 LBS | TEMPERATURE: 97 F

## 2025-07-15 DIAGNOSIS — E78.5 DYSLIPIDEMIA: ICD-10-CM

## 2025-07-15 DIAGNOSIS — Z12.11 ENCOUNTER FOR SCREENING COLONOSCOPY: ICD-10-CM

## 2025-07-15 DIAGNOSIS — Z86.0100 HX OF COLONIC POLYPS: ICD-10-CM

## 2025-07-15 DIAGNOSIS — D12.6 TUBULAR ADENOMA OF COLON: ICD-10-CM

## 2025-07-15 DIAGNOSIS — Z80.0 FAMILY HX OF COLON CANCER: ICD-10-CM

## 2025-07-15 PROCEDURE — 25010000002 LIDOCAINE PF 2% 2 % SOLUTION: Performed by: NURSE ANESTHETIST, CERTIFIED REGISTERED

## 2025-07-15 PROCEDURE — 88305 TISSUE EXAM BY PATHOLOGIST: CPT | Performed by: INTERNAL MEDICINE

## 2025-07-15 PROCEDURE — 25010000002 PROPOFOL 10 MG/ML EMULSION: Performed by: NURSE ANESTHETIST, CERTIFIED REGISTERED

## 2025-07-15 PROCEDURE — 25810000003 LACTATED RINGERS PER 1000 ML: Performed by: NURSE ANESTHETIST, CERTIFIED REGISTERED

## 2025-07-15 RX ORDER — PROPOFOL 10 MG/ML
VIAL (ML) INTRAVENOUS AS NEEDED
Status: DISCONTINUED | OUTPATIENT
Start: 2025-07-15 | End: 2025-07-15 | Stop reason: SURG

## 2025-07-15 RX ORDER — ATORVASTATIN CALCIUM 10 MG/1
10 TABLET, FILM COATED ORAL DAILY
Qty: 90 TABLET | Refills: 0 | Status: SHIPPED | OUTPATIENT
Start: 2025-07-15

## 2025-07-15 RX ORDER — LIDOCAINE HYDROCHLORIDE 20 MG/ML
INJECTION, SOLUTION EPIDURAL; INFILTRATION; INTRACAUDAL; PERINEURAL AS NEEDED
Status: DISCONTINUED | OUTPATIENT
Start: 2025-07-15 | End: 2025-07-15 | Stop reason: SURG

## 2025-07-15 RX ORDER — SODIUM CHLORIDE, SODIUM LACTATE, POTASSIUM CHLORIDE, CALCIUM CHLORIDE 600; 310; 30; 20 MG/100ML; MG/100ML; MG/100ML; MG/100ML
30 INJECTION, SOLUTION INTRAVENOUS CONTINUOUS
Status: DISCONTINUED | OUTPATIENT
Start: 2025-07-15 | End: 2025-07-15 | Stop reason: HOSPADM

## 2025-07-15 RX ADMIN — SODIUM CHLORIDE, POTASSIUM CHLORIDE, SODIUM LACTATE AND CALCIUM CHLORIDE 30 ML/HR: 600; 310; 30; 20 INJECTION, SOLUTION INTRAVENOUS at 08:54

## 2025-07-15 RX ADMIN — PROPOFOL 60 MG: 10 INJECTION, EMULSION INTRAVENOUS at 10:08

## 2025-07-15 RX ADMIN — PROPOFOL 175 MCG/KG/MIN: 10 INJECTION, EMULSION INTRAVENOUS at 10:09

## 2025-07-15 RX ADMIN — LIDOCAINE HYDROCHLORIDE 50 MG: 20 INJECTION, SOLUTION EPIDURAL; INFILTRATION; INTRACAUDAL; PERINEURAL at 10:08

## 2025-07-15 NOTE — ANESTHESIA POSTPROCEDURE EVALUATION
Patient: Fiath Yu    Procedure Summary       Date: 07/15/25 Room / Location: [facility] ENDOSCOPY 2 / [facility] ENDOSCOPY    Anesthesia Start: 1004 Anesthesia Stop: 1038    Procedure: COLONOSCOPY WITH COLD SNARE POLYPECTOMIES Diagnosis:       Encounter for screening colonoscopy      Hx of colonic polyps      Tubular adenoma of colon      Family hx of colon cancer      (Encounter for screening colonoscopy [Z12.11])      (Hx of colonic polyps [Z86.0100])      (Tubular adenoma of colon [D12.6])      (Family hx of colon cancer [Z80.0])    Surgeons: Luis Enrique Valenzuela MD Provider: Javier Shipley CRNA    Anesthesia Type: general ASA Status: 3            Anesthesia Type: general    Vitals  Vitals Value Taken Time   /69 07/15/25 10:53   Temp 36.1 °C (97 °F) 07/15/25 10:52   Pulse 67 07/15/25 10:53   Resp 18 07/15/25 10:52   SpO2 99 % 07/15/25 10:53   Vitals shown include unfiled device data.        Post Anesthesia Care and Evaluation    Post-procedure mental status: acceptable.  Pain management: satisfactory to patient    Airway patency: patent  Anesthetic complications: No anesthetic complications    Cardiovascular status: acceptable  Respiratory status: acceptable    Comments: Per chart review

## 2025-07-15 NOTE — H&P
ScreeningPre Procedure History & Physical    Chief Complaint:   Screening     Subjective     HPI:   Screening     Past Medical History:   Past Medical History:   Diagnosis Date    Arthritis     Cataract     Cervical disc disorder 2025    Just found out about Disk    Colon polyp 1985    CTS (carpal tunnel syndrome) 20010    GERD (gastroesophageal reflux disease)     GERD (gastroesophageal reflux disease) 08/17/2021    Hyperlipidemia     Loss of appetite 08/17/2021    Nausea 08/17/2021    Parkinson's disease     Sciatica     Thoracic or lumbosacral neuritis or radiculitis     Urinary incontinence 2021    Have had this problem for about two years and has gotten real bad now    Urinary tract infection 2022       Past Surgical History:  Past Surgical History:   Procedure Laterality Date    BACK SURGERY      LOW BACK DISC    BLADDER SURGERY  9/11 2001    CARPAL TUNNEL RELEASE      CATARACT EXTRACTION      COLONOSCOPY      ENDOSCOPY      ENDOSCOPY N/A 01/19/2022    Procedure: ESOPHAGOGASTRODUODENOSCOPY WITH BIOPSIES;  Surgeon: Luis Enrique Valenzuela MD;  Location: [facility] ENDOSCOPY;  Service: Gastroenterology;  Laterality: N/A;  HIATAL HERNIA    FOOT SURGERY      HYSTERECTOMY      DUE TO CANCER    SHOULDER SURGERY      TUBAL ABDOMINAL LIGATION      UPPER GASTROINTESTINAL ENDOSCOPY         Family History:  Family History   Problem Relation Age of Onset    Depression Mother     Hyperlipidemia Mother     Hypertension Mother     Alcohol abuse Father     Depression Sister     Hyperlipidemia Sister     Hypertension Sister     Osteoporosis Sister     Depression Brother     Hypertension Brother     Alcohol abuse Brother     Dementia Maternal Aunt     Alcohol abuse Maternal Aunt     Colon cancer Paternal Uncle     Alcohol abuse Maternal Grandfather     Dementia Maternal Grandfather     Alcohol abuse Paternal Grandfather     Malig Hyperthermia Neg Hx     Esophageal cancer Neg Hx     Liver cancer Neg Hx     Rectal cancer Neg Hx      Stomach cancer Neg Hx        Social History:   reports that she quit smoking about 3 years ago. Her smoking use included cigarettes. She started smoking about 53 years ago. She has a 49.7 pack-year smoking history. She has been exposed to tobacco smoke. She has never used smokeless tobacco. She reports that she does not drink alcohol and does not use drugs.    Medications:   Medications Prior to Admission   Medication Sig Dispense Refill Last Dose/Taking    atorvastatin (LIPITOR) 10 MG tablet TAKE 1 TABLET BY MOUTH DAILY 90 tablet 0 7/14/2025    carbidopa-levodopa (SINEMET)  MG per tablet Take 1 tablet by mouth 3 (Three) Times a Day. 90 tablet 5 Past Week    lisinopril (PRINIVIL,ZESTRIL) 10 MG tablet Take 1 tablet by mouth Daily. 90 tablet 0 7/14/2025    tiZANidine (ZANAFLEX) 2 MG tablet TAKE 1 TABLET BY MOUTH TWICE DAILY AS NEEDED FOR MUSCLE SPASMS 90 tablet 0 7/14/2025       Allergies:  Patient has no known allergies.        Objective     Blood pressure 128/64, pulse 58, temperature 96.6 °F (35.9 °C), temperature source Temporal, resp. rate 15, weight 54.6 kg (120 lb 5.9 oz), SpO2 95%.    Physical Exam   Constitutional: Pt is oriented to person, place, and time and well-developed, well-nourished, and in no distress.   Mouth/Throat: Oropharynx is clear and moist.   Neck: Normal range of motion.   Cardiovascular: Normal rate, regular rhythm and normal heart sounds.    Pulmonary/Chest: Effort normal and breath sounds normal.   Abdominal: Soft. Nontender  Skin: Skin is warm and dry.   Psychiatric: Mood, memory, affect and judgment normal.     Assessment & Plan     Diagnosis:  Screening colonoscopy  H/o colon polyps     Anticipated Surgical Procedure:  colonoscopy    The risks, benefits, and alternatives of this procedure have been discussed with the patient or the responsible party- the patient understands and agrees to proceed.

## 2025-07-15 NOTE — LETTER
Faith Yu  [address] [place] [address]    July 15, 2025     Dear Ms. Yu:    Faith the hepatic function panel/liver function tests were all normal range and now the cholesterol levels are all normal range the triglycerides dropped from 205 down to 147 and the LDL which is the bad cholesterol dropped from 166 down to 85 so I would like you to continue taking the atorvastatin 10 mg daily--     Resulted Orders   Hepatic Function Panel   Result Value Ref Range    Total Protein 7.3 6.0 - 8.5 g/dL    Albumin 4.4 3.5 - 5.2 g/dL    ALT (SGPT) 9 1 - 33 U/L    AST (SGOT) 14 1 - 32 U/L    Alkaline Phosphatase 112 39 - 117 U/L    Total Bilirubin 0.3 0.0 - 1.2 mg/dL    Bilirubin, Direct <0.1 0.0 - 0.3 mg/dL    Bilirubin, Indirect        Comment:      Unable to calculate   Lipid Panel   Result Value Ref Range    Total Cholesterol 160 0 - 200 mg/dL    Triglycerides 147 0 - 150 mg/dL    HDL Cholesterol 49 40 - 60 mg/dL    LDL Cholesterol  85 0 - 100 mg/dL    VLDL Cholesterol 26 5 - 40 mg/dL    LDL/HDL Ratio 1.67        If you have any questions or concerns, please don't hesitate to call.         Sincerely,        VEL Hernandez

## 2025-07-15 NOTE — PROGRESS NOTES
Please mail letter to patient stating    Fatih the hepatic function panel/liver function tests were all normal range and now the cholesterol levels are all normal range the triglycerides dropped from 205 down to 147 and the LDL which is the bad cholesterol dropped from 166 down to 85 so I would like you to continue taking the atorvastatin 10 mg daily--

## 2025-08-07 ENCOUNTER — OFFICE VISIT (OUTPATIENT)
Dept: FAMILY MEDICINE CLINIC | Facility: CLINIC | Age: 70
End: 2025-08-07
Payer: MEDICARE

## 2025-08-07 VITALS
WEIGHT: 119 LBS | HEART RATE: 73 BPM | SYSTOLIC BLOOD PRESSURE: 130 MMHG | TEMPERATURE: 96.7 F | DIASTOLIC BLOOD PRESSURE: 82 MMHG | HEIGHT: 57 IN | OXYGEN SATURATION: 97 % | BODY MASS INDEX: 25.67 KG/M2

## 2025-08-07 DIAGNOSIS — Z13.820 SCREENING FOR OSTEOPOROSIS: ICD-10-CM

## 2025-08-07 DIAGNOSIS — M53.82 LIMITED ACTIVE RANGE OF MOTION (AROM) OF CERVICAL SPINE ON ROTATION: ICD-10-CM

## 2025-08-07 DIAGNOSIS — I10 HYPERTENSION, UNSPECIFIED TYPE: Primary | ICD-10-CM

## 2025-08-07 DIAGNOSIS — M85.80 OSTEOPENIA, UNSPECIFIED LOCATION: ICD-10-CM

## 2025-08-07 DIAGNOSIS — E78.5 DYSLIPIDEMIA: ICD-10-CM

## 2025-08-07 DIAGNOSIS — M50.30 DDD (DEGENERATIVE DISC DISEASE), CERVICAL: ICD-10-CM

## 2025-08-07 DIAGNOSIS — Z78.0 POSTMENOPAUSAL: ICD-10-CM

## 2025-08-07 DIAGNOSIS — Z12.31 SCREENING MAMMOGRAM FOR BREAST CANCER: ICD-10-CM

## 2025-08-07 DIAGNOSIS — M54.12 CERVICAL RADICULOPATHY: ICD-10-CM

## 2025-08-07 DIAGNOSIS — M79.18 RHOMBOID MUSCLE PAIN: ICD-10-CM

## 2025-08-07 RX ORDER — TIZANIDINE 2 MG/1
2 TABLET ORAL 2 TIMES DAILY PRN
Qty: 90 TABLET | Refills: 1 | Status: SHIPPED | OUTPATIENT
Start: 2025-08-07

## 2025-08-07 RX ORDER — ATORVASTATIN CALCIUM 10 MG/1
10 TABLET, FILM COATED ORAL DAILY
Qty: 90 TABLET | Refills: 1 | Status: SHIPPED | OUTPATIENT
Start: 2025-08-07

## 2025-08-07 RX ORDER — LISINOPRIL 10 MG/1
10 TABLET ORAL DAILY
Qty: 90 TABLET | Refills: 1 | Status: SHIPPED | OUTPATIENT
Start: 2025-08-07

## 2025-08-14 ENCOUNTER — OFFICE VISIT (OUTPATIENT)
Dept: NEUROLOGY | Facility: CLINIC | Age: 70
End: 2025-08-14
Payer: MEDICARE

## 2025-08-14 VITALS
HEIGHT: 57 IN | BODY MASS INDEX: 25.87 KG/M2 | WEIGHT: 119.9 LBS | SYSTOLIC BLOOD PRESSURE: 121 MMHG | DIASTOLIC BLOOD PRESSURE: 79 MMHG | HEART RATE: 80 BPM

## 2025-08-14 DIAGNOSIS — G20.A2 PARKINSON'S DISEASE WITHOUT DYSKINESIA, WITH FLUCTUATING MANIFESTATIONS: Primary | ICD-10-CM

## 2025-08-14 RX ORDER — CARBIDOPA AND LEVODOPA 25; 100 MG/1; MG/1
1 TABLET ORAL 3 TIMES DAILY
Qty: 90 TABLET | Refills: 5 | Status: SHIPPED | OUTPATIENT
Start: 2025-08-14

## 2025-08-14 RX ORDER — PRAVASTATIN SODIUM 20 MG
20 TABLET ORAL DAILY
COMMUNITY

## (undated) DEVICE — SOL IRRG H2O PL/BG 1000ML STRL

## (undated) DEVICE — LINER SURG CANSTR SXN S/RIGD 1500CC

## (undated) DEVICE — THE STERILE LIGHT HANDLE COVER IS USED WITH STERIS SURGICAL LIGHTING AND VISUALIZATION SYSTEMS.

## (undated) DEVICE — Device: Brand: DEFENDO AIR/WATER/SUCTION AND BIOPSY VALVE

## (undated) DEVICE — SNAR POLYP CAPTIFLEX XS/OVL 11X2.4MM 240CM 1P/U

## (undated) DEVICE — CONN JET HYDRA H20 AUXILIARY DISP

## (undated) DEVICE — Device

## (undated) DEVICE — SINGLE-USE BIOPSY FORCEPS: Brand: RADIAL JAW 4

## (undated) DEVICE — DEFENDO AIR WATER SUCTION AND BIOPSY VALVE KIT FOR  OLYMPUS: Brand: DEFENDO AIR/WATER/SUCTION AND BIOPSY VALVE

## (undated) DEVICE — EGD OR ERCP KIT: Brand: MEDLINE INDUSTRIES, INC.

## (undated) DEVICE — SOLIDIFIER LIQLOC PLS 1500CC BT

## (undated) DEVICE — THE SINGLE USE ETRAP – POLYP TRAP IS USED FOR SUCTION RETRIEVAL OF ENDOSCOPICALLY REMOVED POLYPS.: Brand: ETRAP